# Patient Record
Sex: FEMALE | Race: WHITE | NOT HISPANIC OR LATINO | Employment: UNEMPLOYED | URBAN - METROPOLITAN AREA
[De-identification: names, ages, dates, MRNs, and addresses within clinical notes are randomized per-mention and may not be internally consistent; named-entity substitution may affect disease eponyms.]

---
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
## 2021-10-30 ENCOUNTER — APPOINTMENT (OUTPATIENT)
Dept: RADIOLOGY | Facility: HOSPITAL | Age: 21
End: 2021-10-30
Attending: STUDENT IN AN ORGANIZED HEALTH CARE EDUCATION/TRAINING PROGRAM
Payer: COMMERCIAL

## 2021-10-30 ENCOUNTER — HOSPITAL ENCOUNTER (EMERGENCY)
Facility: HOSPITAL | Age: 21
Discharge: HOME | End: 2021-10-30
Attending: EMERGENCY MEDICINE | Admitting: EMERGENCY MEDICINE
Payer: COMMERCIAL

## 2021-10-30 VITALS
RESPIRATION RATE: 16 BRPM | HEIGHT: 63 IN | BODY MASS INDEX: 21.26 KG/M2 | TEMPERATURE: 98 F | HEART RATE: 74 BPM | WEIGHT: 120 LBS | DIASTOLIC BLOOD PRESSURE: 82 MMHG | OXYGEN SATURATION: 100 % | SYSTOLIC BLOOD PRESSURE: 123 MMHG

## 2021-10-30 DIAGNOSIS — S99.921A FOOT INJURY, RIGHT, INITIAL ENCOUNTER: ICD-10-CM

## 2021-10-30 PROCEDURE — 73630 X-RAY EXAM OF FOOT: CPT | Mod: RT

## 2021-10-30 PROCEDURE — 99283 EMERGENCY DEPT VISIT LOW MDM: CPT

## 2021-10-30 ASSESSMENT — MIFFLIN-ST. JEOR: SCORE: 1278.45

## 2021-10-30 NOTE — Clinical Note
Dunia Corley was seen and treated in our emergency department on 10/30/2021.  She may return to work on 11/01/2021.       If you have any questions or concerns, please don't hesitate to call.      Christian Hopper MD

## 2021-10-30 NOTE — Clinical Note
Dunia Corley was seen and treated in our emergency department on 10/30/2021.    Foot injury     Sincerely,     [facility] Emergency Department

## 2021-10-30 NOTE — Clinical Note
Dunia Corley was seen and treated in our emergency department on 10/30/2021.    Foot injury     Sincerely,     [facility] Emergency Department

## 2021-10-30 NOTE — Clinical Note
Dunia Corley was seen and treated in our emergency department on 10/30/2021.    Foot injury     Sincerely,     [facility] Emergency Department

## 2021-10-30 NOTE — Clinical Note
Dunia Corley was seen and treated in our emergency department on 10/30/2021.    Foot injury     Sincerely,     [facility] Emergency Department

## 2021-10-30 NOTE — Clinical Note
Dunia Corley was seen and treated in our emergency department on 10/30/2021.    Foot injury     Sincerely,     [facility] Emergency Department
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
## 2022-06-20 ENCOUNTER — HOSPITAL ENCOUNTER (EMERGENCY)
Facility: HOSPITAL | Age: 22
Discharge: HOME | End: 2022-06-20
Admitting: PHYSICIAN ASSISTANT
Payer: COMMERCIAL

## 2022-06-20 VITALS
SYSTOLIC BLOOD PRESSURE: 113 MMHG | OXYGEN SATURATION: 96 % | HEART RATE: 95 BPM | HEIGHT: 62 IN | TEMPERATURE: 98 F | RESPIRATION RATE: 16 BRPM | WEIGHT: 120 LBS | BODY MASS INDEX: 22.08 KG/M2 | DIASTOLIC BLOOD PRESSURE: 69 MMHG

## 2022-06-20 DIAGNOSIS — S01.81XA FACIAL LACERATION, INITIAL ENCOUNTER: ICD-10-CM

## 2022-06-20 PROCEDURE — 99282 EMERGENCY DEPT VISIT SF MDM: CPT

## 2022-06-20 ASSESSMENT — ENCOUNTER SYMPTOMS: WOUND: 1

## 2022-06-20 NOTE — Clinical Note
Dunia Corley was seen and treated in our emergency department on 6/20/2022.  She may return to work on 06/21/2022.  This note is to certify that the aforementioned patient was seen in the emergency department.  Please excuse them from work until the above-mentioned date.  If you have questions or concerns please call [facility] emergency department for further details.     If you have any questions or concerns, please don't hesitate to call.      Héctor Bob PA-C

## 2022-06-21 NOTE — DISCHARGE INSTRUCTIONS
You are seen here in the emergency department for evaluation of facial laceration.  The laceration is no longer open, has scabbed over.  Additionally we do not typically close lacerations that are more than 24 hours old.  My recommendation is to use a little bit of antibiotic ointment, continue to monitor the area.  Any possible cuts on the inside of your mouth should heal fairly quickly.  For pain at home he can use ibuprofen or Tylenol.  Additionally please use a little topical lotion over the area, keep it clean and dry.    Please follow-up with your primary care doctor if you continue to have issues with it.    For pain or fever you may use:  -Tylenol 650 mg every 6 hours.  Max 4000 mg in 24 hours  Do not use thismedication with alcohol as it can cause liver problems.  -Ibuprofen 600 mg every 6 hours.  Max 3500 mg in 24 hours  Do not take this medication if you have a history of a GI bleed or have kidney problems.  You may use both of these medications at the same time or you can alternate them every 3 hours.  For example, Tylenol at 6 AM, ibuprofen at 9 AM, Tylenol at noon, etc.
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
## 2022-11-22 ENCOUNTER — HOSPITAL ENCOUNTER (EMERGENCY)
Facility: HOSPITAL | Age: 22
Discharge: HOME | End: 2022-11-22
Attending: EMERGENCY MEDICINE
Payer: COMMERCIAL

## 2022-11-22 VITALS
OXYGEN SATURATION: 98 % | HEIGHT: 64 IN | HEART RATE: 124 BPM | SYSTOLIC BLOOD PRESSURE: 98 MMHG | WEIGHT: 108.3 LBS | DIASTOLIC BLOOD PRESSURE: 50 MMHG | TEMPERATURE: 99 F | BODY MASS INDEX: 18.49 KG/M2 | RESPIRATION RATE: 16 BRPM

## 2022-11-22 DIAGNOSIS — H66.012 ACUTE SUPPURATIVE OTITIS MEDIA OF LEFT EAR WITH SPONTANEOUS RUPTURE OF TYMPANIC MEMBRANE, RECURRENCE NOT SPECIFIED: ICD-10-CM

## 2022-11-22 RX ORDER — CEFDINIR 300 MG/1
300 CAPSULE ORAL 2 TIMES DAILY
Qty: 14 CAPSULE | Refills: 0 | Status: SHIPPED | OUTPATIENT
Start: 2022-11-22 | End: 2024-09-21

## 2022-11-22 NOTE — ED TRIAGE NOTES
Patient noted that when she woke up there was fresh blood on her left ear that she noted with her finger. Patient also notes ear fullness.     MD Valle inspected patient ear in triage and noted a ruptured ear drum on the left

## 2022-12-28 NOTE — ED PROVIDER NOTES
EMERGENCY DEPARTMENT ENCOUNTER      NAME: Dunia Corley  AGE: 22 year old female  YOB: 2000  MRN: [id number]  EVALUATION DATE & TIME: No admission date for patient encounter.    PCP: No Ref-Primary, Physician    ED PROVIDER: Hemal Valle MD      Chief Complaint   Patient presents with     Ear Drainage         FINAL IMPRESSION:  1. Acute suppurative otitis media of left ear with spontaneous rupture of tympanic membrane, recurrence not specified          ED COURSE & MEDICAL DECISION MAKING:    Pertinent Labs & Imaging studies reviewed. (See chart for details)  22 year old female presents to the Emergency Department for evaluation of fullness in left ear and discharge from left ear.  She had gone to bed feeling well.  Awakened with some blood on the pillow and then on her fingertip.  No obvious cold symptoms.  Patient seen in triage area.  Vital signs significant for tachycardia.  Patient with inferior posterior perforation on the left TM.  Heart and lungs unremarkable.  Will initiate antibiotics.  Routine return precautions given.         At the conclusion of the encounter I discussed the results of all of the tests and the disposition. The questions were answered. The patient or family acknowledged understanding and was agreeable with the care plan.         MEDICATIONS GIVEN IN THE EMERGENCY:  Medications - No data to display    NEW PRESCRIPTIONS STARTED AT TODAY'S ER VISIT  Discharge Medication List as of 11/22/2022  3:46 PM      START taking these medications    Details   cefdinir (OMNICEF) 300 MG capsule Take 1 capsule (300 mg) by mouth 2 times daily, Disp-14 capsule, R-0, Local Print                =================================================================    HPI          Dunia Corley is a 22 year old female with a pertinent history of polysubstance abuse presenting with left ear fullness and bloody discharge.  Symptoms over this morning.  Denies any significant cough or cold  "symptoms.  No obvious headache.    REVIEW OF SYSTEMS   Review of Systems negative fevers, chills.  No nausea or vomiting.  Main review of systems was negative    PAST MEDICAL HISTORY:  No past medical history on file.    PAST SURGICAL HISTORY:  No past surgical history on file.        CURRENT MEDICATIONS:    cefdinir (OMNICEF) 300 MG capsule  cholecalciferol 4000 units TABS  ferrous sulfate (IRON) 325 (65 Fe) MG tablet        ALLERGIES:  No Known Allergies    FAMILY HISTORY:  Family History   Problem Relation Age of Onset     Substance Abuse Mother      Substance Abuse Father      Substance Abuse Maternal Aunt      Substance Abuse Paternal Uncle        SOCIAL HISTORY:   Social History     Socioeconomic History     Marital status: Single   Substance and Sexual Activity     Alcohol use: Yes     Drug use: Yes     Types: Cocaine, Marijuana, Oxycodone, Benzodiazepines, MDMA (Ecstasy), Amphetamines, Opiates, LSD     Comment: Drug use: Current Xanax, history of cocaine, LSD, percocet, and marijuana use     Sexual activity: Yes     Partners: Male       VITALS:  BP 98/50   Pulse (!) 124   Temp 99  F (37.2  C) (Temporal)   Resp 16   Ht 1.626 m (5' 4\")   Wt 49.1 kg (108 lb 4.8 oz)   SpO2 98%   BMI 18.59 kg/m      PHYSICAL EXAM    Constitutional: Well developed, Well nourished, NAD,   HENT: Normocephalic, Atraumatic, Bilateral external ears normal, Oropharynx normal, mucous membranes moist, Nose normal. Neck-  Normal range of motion.  Left TM with posterior inferior perforation with slight bloody discharge.    Eyes: PERRL, EOMI, Conjunctiva normal, No discharge.   Respiratory: Normal breath sounds, No respiratory distress, No wheezing, Speaks full sentences easily.    Cardiovascular: Rapid heart rate, Regular rhythm, No murmurs, No rubs, No gallops  Musculoskeletal: No cyanosis, No clubbing. Good range of motion in all major joints.    Integument: Warm, Dry, No erythema, No rash. No petechiae.  Neurologic: Alert & " oriented x 3, Normal motor function, Normal sensory function, No focal deficits noted. Normal gait. se see official radiology report.            Hemal Valle MD  [facility] EMERGENCY DEPARTMENT  [address] [place] [id number] [phone]     Hemal Valle MD  12/28/22 0786

## 2023-04-24 ENCOUNTER — HOSPITAL ENCOUNTER (EMERGENCY)
Facility: HOSPITAL | Age: 23
Discharge: HOME | End: 2023-04-24
Attending: EMERGENCY MEDICINE | Admitting: EMERGENCY MEDICINE
Payer: COMMERCIAL

## 2023-04-24 VITALS
OXYGEN SATURATION: 100 % | DIASTOLIC BLOOD PRESSURE: 80 MMHG | TEMPERATURE: 98.5 F | HEART RATE: 130 BPM | BODY MASS INDEX: 19.63 KG/M2 | HEIGHT: 64 IN | RESPIRATION RATE: 18 BRPM | SYSTOLIC BLOOD PRESSURE: 158 MMHG | WEIGHT: 115 LBS

## 2023-04-24 DIAGNOSIS — S01.81XA FACIAL LACERATION, INITIAL ENCOUNTER: ICD-10-CM

## 2023-04-24 PROBLEM — S52.236K: Status: ACTIVE | Noted: 2021-08-25

## 2023-04-24 PROBLEM — Z86.2 HISTORY OF ANEMIA: Status: ACTIVE | Noted: 2020-09-28

## 2023-04-24 PROBLEM — Z87.898 HISTORY OF SUBSTANCE USE DISORDER: Status: ACTIVE | Noted: 2018-07-10

## 2023-04-24 PROBLEM — G47.09 OTHER INSOMNIA: Status: ACTIVE | Noted: 2018-08-13

## 2023-04-24 PROBLEM — T84.84XA PAINFUL ORTHOPAEDIC HARDWARE (H): Status: ACTIVE | Noted: 2022-03-15

## 2023-04-24 PROCEDURE — 99283 EMERGENCY DEPT VISIT LOW MDM: CPT

## 2023-04-24 PROCEDURE — 250N000009 HC RX 250: Performed by: EMERGENCY MEDICINE

## 2023-04-24 PROCEDURE — 12011 RPR F/E/E/N/L/M 2.5 CM/<: CPT

## 2023-04-24 RX ORDER — ERYTHROMYCIN 5 MG/G
0.5 OINTMENT OPHTHALMIC 2 TIMES DAILY
Qty: 1 G | Refills: 0 | Status: SHIPPED | OUTPATIENT
Start: 2023-04-24 | End: 2023-04-24

## 2023-04-24 RX ORDER — ERYTHROMYCIN 5 MG/G
0.5 OINTMENT OPHTHALMIC 2 TIMES DAILY
Qty: 3.5 G | Refills: 0 | Status: SHIPPED | OUTPATIENT
Start: 2023-04-24 | End: 2024-09-21

## 2023-04-24 RX ORDER — TETRACAINE HYDROCHLORIDE 5 MG/ML
1-2 SOLUTION OPHTHALMIC ONCE
Status: COMPLETED | OUTPATIENT
Start: 2023-04-24 | End: 2023-04-24

## 2023-04-24 RX ORDER — GINSENG 100 MG
CAPSULE ORAL ONCE
Status: COMPLETED | OUTPATIENT
Start: 2023-04-24 | End: 2023-04-24

## 2023-04-24 RX ADMIN — BACITRACIN 1 APPLICATION: 500 OINTMENT TOPICAL at 06:10

## 2023-04-24 RX ADMIN — FLUORESCEIN SODIUM 1 STRIP: 1 STRIP OPHTHALMIC at 04:36

## 2023-04-24 RX ADMIN — TETRACAINE HYDROCHLORIDE 2 DROP: 5 SOLUTION OPHTHALMIC at 04:36

## 2023-04-24 ASSESSMENT — ENCOUNTER SYMPTOMS
EYE PAIN: 0
EYE DISCHARGE: 1
PHOTOPHOBIA: 0
WOUND: 1

## 2023-04-24 ASSESSMENT — ACTIVITIES OF DAILY LIVING (ADL): ADLS_ACUITY_SCORE: 35
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
## 2023-04-24 NOTE — ED NOTES
[facility]  Expected date:   Expected time:   Means of arrival:   Comments:  Crash
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
## 2024-09-20 ENCOUNTER — HOSPITAL ENCOUNTER (EMERGENCY)
Facility: HOSPITAL | Age: 24
Discharge: STILL ADMITTED OR ADMINISTRATIVE | End: 2024-09-21
Attending: EMERGENCY MEDICINE | Admitting: EMERGENCY MEDICINE
Payer: COMMERCIAL

## 2024-09-20 DIAGNOSIS — L02.01 ACUTE ABSCESS OF FACE: ICD-10-CM

## 2024-09-20 DIAGNOSIS — S02.652B OPEN FRACTURE OF LEFT MANDIBULAR ANGLE, INITIAL ENCOUNTER (H): ICD-10-CM

## 2024-09-20 DIAGNOSIS — E87.6 HYPOKALEMIA: ICD-10-CM

## 2024-09-20 PROCEDURE — 96367 TX/PROPH/DG ADDL SEQ IV INF: CPT

## 2024-09-20 PROCEDURE — 99285 EMERGENCY DEPT VISIT HI MDM: CPT | Mod: 25

## 2024-09-20 PROCEDURE — 96366 THER/PROPH/DIAG IV INF ADDON: CPT

## 2024-09-20 PROCEDURE — 96375 TX/PRO/DX INJ NEW DRUG ADDON: CPT

## 2024-09-20 PROCEDURE — 96365 THER/PROPH/DIAG IV INF INIT: CPT

## 2024-09-20 ASSESSMENT — COLUMBIA-SUICIDE SEVERITY RATING SCALE - C-SSRS
1. IN THE PAST MONTH, HAVE YOU WISHED YOU WERE DEAD OR WISHED YOU COULD GO TO SLEEP AND NOT WAKE UP?: NO
6. HAVE YOU EVER DONE ANYTHING, STARTED TO DO ANYTHING, OR PREPARED TO DO ANYTHING TO END YOUR LIFE?: NO
2. HAVE YOU ACTUALLY HAD ANY THOUGHTS OF KILLING YOURSELF IN THE PAST MONTH?: NO

## 2024-09-20 ASSESSMENT — ACTIVITIES OF DAILY LIVING (ADL): ADLS_ACUITY_SCORE: 33

## 2024-09-21 ENCOUNTER — ANESTHESIA EVENT (OUTPATIENT)
Dept: SURGERY | Facility: CLINIC | Age: 24
DRG: 141 | End: 2024-09-21
Payer: COMMERCIAL

## 2024-09-21 ENCOUNTER — APPOINTMENT (OUTPATIENT)
Dept: CT IMAGING | Facility: HOSPITAL | Age: 24
End: 2024-09-21
Attending: EMERGENCY MEDICINE
Payer: COMMERCIAL

## 2024-09-21 ENCOUNTER — HOSPITAL ENCOUNTER (INPATIENT)
Facility: CLINIC | Age: 24
LOS: 8 days | Discharge: HOME | DRG: 141 | End: 2024-09-29
Attending: EMERGENCY MEDICINE | Admitting: INTERNAL MEDICINE
Payer: COMMERCIAL

## 2024-09-21 VITALS
TEMPERATURE: 98.9 F | RESPIRATION RATE: 16 BRPM | SYSTOLIC BLOOD PRESSURE: 112 MMHG | HEIGHT: 63 IN | BODY MASS INDEX: 19.99 KG/M2 | DIASTOLIC BLOOD PRESSURE: 60 MMHG | WEIGHT: 112.8 LBS | OXYGEN SATURATION: 98 % | HEART RATE: 68 BPM

## 2024-09-21 DIAGNOSIS — T84.84XA PAINFUL ORTHOPAEDIC HARDWARE (H): ICD-10-CM

## 2024-09-21 DIAGNOSIS — K12.2 ORAL ABSCESS: ICD-10-CM

## 2024-09-21 DIAGNOSIS — E87.6 HYPOKALEMIA: ICD-10-CM

## 2024-09-21 DIAGNOSIS — S02.609B: ICD-10-CM

## 2024-09-21 DIAGNOSIS — S52.235K: Primary | ICD-10-CM

## 2024-09-21 LAB
ALBUMIN SERPL BCG-MCNC: 3.3 G/DL (ref 3.5–5.2)
ALP SERPL-CCNC: 91 U/L (ref 40–150)
ALT SERPL W P-5'-P-CCNC: 8 U/L (ref 0–50)
ANION GAP SERPL CALCULATED.3IONS-SCNC: 12 MMOL/L (ref 7–15)
APAP SERPL-MCNC: <5 UG/ML (ref 10–30)
APTT PPP: 36 SECONDS (ref 22–38)
AST SERPL W P-5'-P-CCNC: 12 U/L (ref 0–45)
BASOPHILS # BLD AUTO: 0 10E3/UL (ref 0–0.2)
BASOPHILS NFR BLD AUTO: 0 %
BILIRUB DIRECT SERPL-MCNC: <0.2 MG/DL (ref 0–0.3)
BILIRUB SERPL-MCNC: 0.3 MG/DL
BUN SERPL-MCNC: 11.4 MG/DL (ref 6–20)
CALCIUM SERPL-MCNC: 8.5 MG/DL (ref 8.8–10.4)
CHLORIDE SERPL-SCNC: 100 MMOL/L (ref 98–107)
CREAT SERPL-MCNC: 0.61 MG/DL (ref 0.51–0.95)
EGFRCR SERPLBLD CKD-EPI 2021: >90 ML/MIN/1.73M2
EOSINOPHIL # BLD AUTO: 0 10E3/UL (ref 0–0.7)
EOSINOPHIL NFR BLD AUTO: 0 %
ERYTHROCYTE [DISTWIDTH] IN BLOOD BY AUTOMATED COUNT: 14 % (ref 10–15)
GLUCOSE SERPL-MCNC: 91 MG/DL (ref 70–99)
HCG SERPL QL: NEGATIVE
HCO3 SERPL-SCNC: 24 MMOL/L (ref 22–29)
HCT VFR BLD AUTO: 29.3 % (ref 35–47)
HGB BLD-MCNC: 9.7 G/DL (ref 11.7–15.7)
HOLD SPECIMEN: NORMAL
IMM GRANULOCYTES # BLD: 0.1 10E3/UL
IMM GRANULOCYTES NFR BLD: 1 %
INR PPP: 1.1 (ref 0.85–1.15)
LYMPHOCYTES # BLD AUTO: 1.8 10E3/UL (ref 0.8–5.3)
LYMPHOCYTES NFR BLD AUTO: 13 %
MCH RBC QN AUTO: 27.6 PG (ref 26.5–33)
MCHC RBC AUTO-ENTMCNC: 33.1 G/DL (ref 31.5–36.5)
MCV RBC AUTO: 84 FL (ref 78–100)
MONOCYTES # BLD AUTO: 1.1 10E3/UL (ref 0–1.3)
MONOCYTES NFR BLD AUTO: 7 %
NEUTROPHILS # BLD AUTO: 11.3 10E3/UL (ref 1.6–8.3)
NEUTROPHILS NFR BLD AUTO: 79 %
NRBC # BLD AUTO: 0 10E3/UL
NRBC BLD AUTO-RTO: 0 /100
PLATELET # BLD AUTO: 395 10E3/UL (ref 150–450)
POTASSIUM SERPL-SCNC: 2.7 MMOL/L (ref 3.4–5.3)
POTASSIUM SERPL-SCNC: 3 MMOL/L (ref 3.4–5.3)
POTASSIUM SERPL-SCNC: 4.6 MMOL/L (ref 3.4–5.3)
PROT SERPL-MCNC: 6.3 G/DL (ref 6.4–8.3)
RBC # BLD AUTO: 3.51 10E6/UL (ref 3.8–5.2)
SODIUM SERPL-SCNC: 136 MMOL/L (ref 135–145)
WBC # BLD AUTO: 14.4 10E3/UL (ref 4–11)

## 2024-09-21 PROCEDURE — 80143 DRUG ASSAY ACETAMINOPHEN: CPT

## 2024-09-21 PROCEDURE — 258N000003 HC RX IP 258 OP 636

## 2024-09-21 PROCEDURE — 84132 ASSAY OF SERUM POTASSIUM: CPT | Performed by: EMERGENCY MEDICINE

## 2024-09-21 PROCEDURE — 85610 PROTHROMBIN TIME: CPT | Performed by: EMERGENCY MEDICINE

## 2024-09-21 PROCEDURE — 85025 COMPLETE CBC W/AUTO DIFF WBC: CPT | Performed by: EMERGENCY MEDICINE

## 2024-09-21 PROCEDURE — 250N000011 HC RX IP 250 OP 636

## 2024-09-21 PROCEDURE — 250N000011 HC RX IP 250 OP 636: Performed by: EMERGENCY MEDICINE

## 2024-09-21 PROCEDURE — 84132 ASSAY OF SERUM POTASSIUM: CPT | Performed by: INTERNAL MEDICINE

## 2024-09-21 PROCEDURE — 96374 THER/PROPH/DIAG INJ IV PUSH: CPT | Performed by: EMERGENCY MEDICINE

## 2024-09-21 PROCEDURE — 84703 CHORIONIC GONADOTROPIN ASSAY: CPT | Performed by: EMERGENCY MEDICINE

## 2024-09-21 PROCEDURE — 258N000003 HC RX IP 258 OP 636: Performed by: EMERGENCY MEDICINE

## 2024-09-21 PROCEDURE — 99285 EMERGENCY DEPT VISIT HI MDM: CPT | Mod: 25 | Performed by: EMERGENCY MEDICINE

## 2024-09-21 PROCEDURE — 250N000013 HC RX MED GY IP 250 OP 250 PS 637: Performed by: INTERNAL MEDICINE

## 2024-09-21 PROCEDURE — 70491 CT SOFT TISSUE NECK W/DYE: CPT

## 2024-09-21 PROCEDURE — 99207 PR APP CREDIT; MD BILLING SHARED VISIT: CPT

## 2024-09-21 PROCEDURE — 80048 BASIC METABOLIC PNL TOTAL CA: CPT | Performed by: EMERGENCY MEDICINE

## 2024-09-21 PROCEDURE — 250N000013 HC RX MED GY IP 250 OP 250 PS 637

## 2024-09-21 PROCEDURE — 120N000002 HC R&B MED SURG/OB UMMC

## 2024-09-21 PROCEDURE — 85730 THROMBOPLASTIN TIME PARTIAL: CPT | Performed by: EMERGENCY MEDICINE

## 2024-09-21 PROCEDURE — 250N000011 HC RX IP 250 OP 636: Performed by: INTERNAL MEDICINE

## 2024-09-21 PROCEDURE — 36415 COLL VENOUS BLD VENIPUNCTURE: CPT | Performed by: EMERGENCY MEDICINE

## 2024-09-21 PROCEDURE — 82248 BILIRUBIN DIRECT: CPT

## 2024-09-21 PROCEDURE — 96361 HYDRATE IV INFUSION ADD-ON: CPT | Performed by: EMERGENCY MEDICINE

## 2024-09-21 PROCEDURE — 99285 EMERGENCY DEPT VISIT HI MDM: CPT | Performed by: EMERGENCY MEDICINE

## 2024-09-21 PROCEDURE — 87040 BLOOD CULTURE FOR BACTERIA: CPT

## 2024-09-21 PROCEDURE — 99222 1ST HOSP IP/OBS MODERATE 55: CPT | Mod: AI | Performed by: INTERNAL MEDICINE

## 2024-09-21 PROCEDURE — 36415 COLL VENOUS BLD VENIPUNCTURE: CPT | Performed by: INTERNAL MEDICINE

## 2024-09-21 RX ORDER — TRETINOIN 0.5 MG/G
CREAM TOPICAL AT BEDTIME
Status: ON HOLD | COMMUNITY
End: 2024-09-29

## 2024-09-21 RX ORDER — LIDOCAINE 40 MG/G
CREAM TOPICAL
Status: DISCONTINUED | OUTPATIENT
Start: 2024-09-21 | End: 2024-09-30 | Stop reason: HOSPADM

## 2024-09-21 RX ORDER — ONDANSETRON 4 MG/1
4 TABLET, ORALLY DISINTEGRATING ORAL EVERY 6 HOURS PRN
Status: CANCELLED | OUTPATIENT
Start: 2024-09-21

## 2024-09-21 RX ORDER — SODIUM CHLORIDE 9 MG/ML
INJECTION, SOLUTION INTRAVENOUS CONTINUOUS
Status: DISCONTINUED | OUTPATIENT
Start: 2024-09-21 | End: 2024-09-21

## 2024-09-21 RX ORDER — ACETAMINOPHEN 325 MG/1
650 TABLET ORAL EVERY 4 HOURS PRN
Status: DISCONTINUED | OUTPATIENT
Start: 2024-09-21 | End: 2024-09-30 | Stop reason: HOSPADM

## 2024-09-21 RX ORDER — POTASSIUM CHLORIDE 7.45 MG/ML
10 INJECTION INTRAVENOUS ONCE
Status: COMPLETED | OUTPATIENT
Start: 2024-09-21 | End: 2024-09-21

## 2024-09-21 RX ORDER — IBUPROFEN 800 MG/1
800 TABLET, FILM COATED ORAL EVERY 8 HOURS PRN
Status: ON HOLD | COMMUNITY
Start: 2024-07-16 | End: 2024-09-29

## 2024-09-21 RX ORDER — AMOXICILLIN 250 MG
1 CAPSULE ORAL 2 TIMES DAILY
Status: CANCELLED | OUTPATIENT
Start: 2024-09-21

## 2024-09-21 RX ORDER — BISACODYL 10 MG
10 SUPPOSITORY, RECTAL RECTAL DAILY PRN
Status: CANCELLED | OUTPATIENT
Start: 2024-09-24

## 2024-09-21 RX ORDER — LIDOCAINE 40 MG/G
CREAM TOPICAL
Status: CANCELLED | OUTPATIENT
Start: 2024-09-21

## 2024-09-21 RX ORDER — PROCHLORPERAZINE MALEATE 10 MG
10 TABLET ORAL EVERY 6 HOURS PRN
Status: CANCELLED | OUTPATIENT
Start: 2024-09-21

## 2024-09-21 RX ORDER — ONDANSETRON 2 MG/ML
4 INJECTION INTRAMUSCULAR; INTRAVENOUS EVERY 6 HOURS PRN
Status: CANCELLED | OUTPATIENT
Start: 2024-09-21

## 2024-09-21 RX ORDER — HYDROMORPHONE HCL IN WATER/PF 6 MG/30 ML
0.2 PATIENT CONTROLLED ANALGESIA SYRINGE INTRAVENOUS ONCE
Status: COMPLETED | OUTPATIENT
Start: 2024-09-21 | End: 2024-09-21

## 2024-09-21 RX ORDER — HYDROMORPHONE HCL IN WATER/PF 6 MG/30 ML
0.2 PATIENT CONTROLLED ANALGESIA SYRINGE INTRAVENOUS
Status: DISCONTINUED | OUTPATIENT
Start: 2024-09-21 | End: 2024-09-30 | Stop reason: HOSPADM

## 2024-09-21 RX ORDER — POTASSIUM CHLORIDE 1.5 G/1.58G
40 POWDER, FOR SOLUTION ORAL ONCE
Status: COMPLETED | OUTPATIENT
Start: 2024-09-21 | End: 2024-09-21

## 2024-09-21 RX ORDER — OXYCODONE HYDROCHLORIDE 5 MG/1
5 TABLET ORAL EVERY 4 HOURS PRN
Status: DISCONTINUED | OUTPATIENT
Start: 2024-09-21 | End: 2024-09-30 | Stop reason: HOSPADM

## 2024-09-21 RX ORDER — SODIUM CHLORIDE 9 MG/ML
INJECTION, SOLUTION INTRAVENOUS CONTINUOUS
Status: DISCONTINUED | OUTPATIENT
Start: 2024-09-21 | End: 2024-09-21 | Stop reason: HOSPADM

## 2024-09-21 RX ORDER — POLYETHYLENE GLYCOL 3350 17 G/17G
17 POWDER, FOR SOLUTION ORAL DAILY
Status: CANCELLED | OUTPATIENT
Start: 2024-09-22

## 2024-09-21 RX ORDER — MIRTAZAPINE 15 MG/1
15 TABLET, ORALLY DISINTEGRATING ORAL AT BEDTIME
Status: DISCONTINUED | OUTPATIENT
Start: 2024-09-21 | End: 2024-09-30 | Stop reason: HOSPADM

## 2024-09-21 RX ORDER — SODIUM CHLORIDE, SODIUM LACTATE, POTASSIUM CHLORIDE, CALCIUM CHLORIDE 600; 310; 30; 20 MG/100ML; MG/100ML; MG/100ML; MG/100ML
INJECTION, SOLUTION INTRAVENOUS CONTINUOUS
Status: DISCONTINUED | OUTPATIENT
Start: 2024-09-21 | End: 2024-09-21

## 2024-09-21 RX ORDER — POTASSIUM CHLORIDE 1.5 G/1.58G
20 POWDER, FOR SOLUTION ORAL ONCE
Status: COMPLETED | OUTPATIENT
Start: 2024-09-21 | End: 2024-09-21

## 2024-09-21 RX ORDER — KETOROLAC TROMETHAMINE 15 MG/ML
15 INJECTION, SOLUTION INTRAMUSCULAR; INTRAVENOUS ONCE
Status: COMPLETED | OUTPATIENT
Start: 2024-09-21 | End: 2024-09-21

## 2024-09-21 RX ORDER — ACETAMINOPHEN 325 MG/1
650 TABLET ORAL EVERY 4 HOURS PRN
Status: CANCELLED | OUTPATIENT
Start: 2024-09-24

## 2024-09-21 RX ORDER — PIPERACILLIN SODIUM, TAZOBACTAM SODIUM 3; .375 G/15ML; G/15ML
3.38 INJECTION, POWDER, LYOPHILIZED, FOR SOLUTION INTRAVENOUS ONCE
Status: COMPLETED | OUTPATIENT
Start: 2024-09-21 | End: 2024-09-21

## 2024-09-21 RX ORDER — ACETAMINOPHEN 500 MG
1000 TABLET ORAL EVERY 8 HOURS PRN
Status: ON HOLD | COMMUNITY
Start: 2024-07-16 | End: 2024-09-29

## 2024-09-21 RX ORDER — AMPICILLIN AND SULBACTAM 2; 1 G/1; G/1
3 INJECTION, POWDER, FOR SOLUTION INTRAMUSCULAR; INTRAVENOUS ONCE
Status: COMPLETED | OUTPATIENT
Start: 2024-09-21 | End: 2024-09-21

## 2024-09-21 RX ORDER — MIRTAZAPINE 15 MG/1
15 TABLET, FILM COATED ORAL AT BEDTIME
COMMUNITY
Start: 2024-07-16 | End: 2024-10-04

## 2024-09-21 RX ORDER — BUPROPION HYDROCHLORIDE 300 MG/1
300 TABLET ORAL EVERY MORNING
Status: ON HOLD | COMMUNITY
Start: 2024-07-17 | End: 2024-09-29

## 2024-09-21 RX ORDER — DEXAMETHASONE SODIUM PHOSPHATE 10 MG/ML
8 INJECTION, SOLUTION INTRAMUSCULAR; INTRAVENOUS EVERY 8 HOURS
Status: DISPENSED | OUTPATIENT
Start: 2024-09-21 | End: 2024-09-22

## 2024-09-21 RX ORDER — ONDANSETRON 2 MG/ML
4 INJECTION INTRAMUSCULAR; INTRAVENOUS EVERY 30 MIN PRN
Status: DISCONTINUED | OUTPATIENT
Start: 2024-09-20 | End: 2024-09-21 | Stop reason: HOSPADM

## 2024-09-21 RX ORDER — AMPICILLIN AND SULBACTAM 2; 1 G/1; G/1
3 INJECTION, POWDER, FOR SOLUTION INTRAMUSCULAR; INTRAVENOUS EVERY 6 HOURS
Status: DISCONTINUED | OUTPATIENT
Start: 2024-09-21 | End: 2024-09-30 | Stop reason: HOSPADM

## 2024-09-21 RX ORDER — ACETAMINOPHEN 325 MG/1
975 TABLET ORAL EVERY 8 HOURS
Status: CANCELLED | OUTPATIENT
Start: 2024-09-21 | End: 2024-09-24

## 2024-09-21 RX ORDER — IOPAMIDOL 755 MG/ML
80 INJECTION, SOLUTION INTRAVASCULAR ONCE
Status: COMPLETED | OUTPATIENT
Start: 2024-09-21 | End: 2024-09-21

## 2024-09-21 RX ADMIN — ACETAMINOPHEN 650 MG: 325 TABLET, FILM COATED ORAL at 18:25

## 2024-09-21 RX ADMIN — POTASSIUM CHLORIDE 10 MEQ: 7.46 INJECTION, SOLUTION INTRAVENOUS at 04:49

## 2024-09-21 RX ADMIN — HYDROMORPHONE HYDROCHLORIDE 0.2 MG: 0.2 INJECTION, SOLUTION INTRAMUSCULAR; INTRAVENOUS; SUBCUTANEOUS at 09:26

## 2024-09-21 RX ADMIN — AMPICILLIN SODIUM AND SULBACTAM SODIUM 3 G: 2; 1 INJECTION, POWDER, FOR SOLUTION INTRAMUSCULAR; INTRAVENOUS at 18:26

## 2024-09-21 RX ADMIN — POTASSIUM CHLORIDE 10 MEQ: 7.46 INJECTION, SOLUTION INTRAVENOUS at 10:30

## 2024-09-21 RX ADMIN — AMPICILLIN SODIUM AND SULBACTAM SODIUM 3 G: 2; 1 INJECTION, POWDER, FOR SOLUTION INTRAMUSCULAR; INTRAVENOUS at 05:50

## 2024-09-21 RX ADMIN — SODIUM CHLORIDE, POTASSIUM CHLORIDE, SODIUM LACTATE AND CALCIUM CHLORIDE: 600; 310; 30; 20 INJECTION, SOLUTION INTRAVENOUS at 13:09

## 2024-09-21 RX ADMIN — OXYCODONE HYDROCHLORIDE 5 MG: 5 TABLET ORAL at 15:48

## 2024-09-21 RX ADMIN — HYDROMORPHONE HYDROCHLORIDE 0.2 MG: 0.2 INJECTION, SOLUTION INTRAMUSCULAR; INTRAVENOUS; SUBCUTANEOUS at 12:18

## 2024-09-21 RX ADMIN — DEXAMETHASONE SODIUM PHOSPHATE 8 MG: 10 INJECTION INTRAMUSCULAR; INTRAVENOUS at 18:28

## 2024-09-21 RX ADMIN — AMPICILLIN SODIUM AND SULBACTAM SODIUM 3 G: 2; 1 INJECTION, POWDER, FOR SOLUTION INTRAMUSCULAR; INTRAVENOUS at 12:23

## 2024-09-21 RX ADMIN — POTASSIUM CHLORIDE 20 MEQ: 1.5 POWDER, FOR SOLUTION ORAL at 18:47

## 2024-09-21 RX ADMIN — SODIUM CHLORIDE: 9 INJECTION, SOLUTION INTRAVENOUS at 09:26

## 2024-09-21 RX ADMIN — OXYCODONE HYDROCHLORIDE 5 MG: 5 TABLET ORAL at 21:23

## 2024-09-21 RX ADMIN — PIPERACILLIN AND TAZOBACTAM 3.38 G: 3; .375 INJECTION, POWDER, FOR SOLUTION INTRAVENOUS at 00:29

## 2024-09-21 RX ADMIN — IOPAMIDOL 80 ML: 755 INJECTION, SOLUTION INTRAVENOUS at 02:00

## 2024-09-21 RX ADMIN — MIRTAZAPINE 15 MG: 15 TABLET, ORALLY DISINTEGRATING ORAL at 21:23

## 2024-09-21 RX ADMIN — POTASSIUM CHLORIDE 40 MEQ: 1.5 POWDER, FOR SOLUTION ORAL at 15:32

## 2024-09-21 RX ADMIN — HYDROMORPHONE HYDROCHLORIDE 0.2 MG: 0.2 INJECTION, SOLUTION INTRAMUSCULAR; INTRAVENOUS; SUBCUTANEOUS at 18:27

## 2024-09-21 RX ADMIN — KETOROLAC TROMETHAMINE 15 MG: 15 INJECTION, SOLUTION INTRAMUSCULAR; INTRAVENOUS at 00:27

## 2024-09-21 RX ADMIN — SODIUM CHLORIDE: 9 INJECTION, SOLUTION INTRAVENOUS at 02:57

## 2024-09-21 RX ADMIN — POTASSIUM CHLORIDE 10 MEQ: 7.46 INJECTION, SOLUTION INTRAVENOUS at 02:57

## 2024-09-21 ASSESSMENT — COLUMBIA-SUICIDE SEVERITY RATING SCALE - C-SSRS
2. HAVE YOU ACTUALLY HAD ANY THOUGHTS OF KILLING YOURSELF IN THE PAST MONTH?: NO
1. IN THE PAST MONTH, HAVE YOU WISHED YOU WERE DEAD OR WISHED YOU COULD GO TO SLEEP AND NOT WAKE UP?: NO
6. HAVE YOU EVER DONE ANYTHING, STARTED TO DO ANYTHING, OR PREPARED TO DO ANYTHING TO END YOUR LIFE?: NO

## 2024-09-21 ASSESSMENT — ENCOUNTER SYMPTOMS: FACIAL SWELLING: 1

## 2024-09-21 ASSESSMENT — ACTIVITIES OF DAILY LIVING (ADL)
ADLS_ACUITY_SCORE: 18
ADLS_ACUITY_SCORE: 35
ADLS_ACUITY_SCORE: 18
ADLS_ACUITY_SCORE: 35
ADLS_ACUITY_SCORE: 35
ADLS_ACUITY_SCORE: 18
ADLS_ACUITY_SCORE: 35
ADLS_ACUITY_SCORE: 18
ADLS_ACUITY_SCORE: 35
ADLS_ACUITY_SCORE: 18

## 2024-09-21 NOTE — PLAN OF CARE
"Reason for admission: worsening L facial pain/swelling with abscess  Primary team notified of pt arrival.  Admitted from: ED WB  Via: wheelchair  Accompanied by: mother and daughter  Belongings: in patient's bag and remains with her.  Admission Required Doc Completed: No.  Teaching: Orientation to unit and call light- call light within reach, use of console, meal times, when to call for the RN, and enforced importance of safety.  IV Access: R FA PIV, saline locked.  Telemetry: No  Ht./Wt.: Completed  Code Status verified on armband: Yes.  2 RN Skin Assessment Completed with: Refused full skin assessment. She stated she does not have any wound or any skin concern.  Suction/Ambu bag/Flowmeter at bedside: Yes.    Pt status:   Vital signs:  Temp: (!) 100.8  F (38.2  C) Temp src: Oral BP: 123/84 Pulse: 101   Resp: 20 SpO2: 99 % O2 Device: None (Room air)   Height: 160 cm (5' 2.99\") Weight: 52.9 kg (116 lb 11.2 oz)  Estimated body mass index is 20.68 kg/m  as calculated from the following:    Height as of this encounter: 1.6 m (5' 2.99\").    Weight as of this encounter: 52.9 kg (116 lb 11.2 oz).    A/Ox4. Ambulatory and independent. Denied chestpain, dizziness, light-headedness. Tolerating mechanical/dental soft diet and whole pills. Continent of bowel and bladder. Last BM on 9/19/24. La facial pain rated 8/10 with IV Dilaudid and Oxycodone as PRN. Tylenol was given for T-100.8, recheck was 99.5F.  Potassium was replaced per protocol. Latest Result of 3.0 and to recheck at 2145.  NPO overnight for surgical intervention in the morning. OMF is following.              "

## 2024-09-21 NOTE — ED PROVIDER NOTES
"ED Provider Note  [facility] [place]      History     Chief Complaint   Patient presents with    Jaw Pain     Patient had gone to [facility] for dental pain, found out that patient had that patient had fracture on the left mandible with presence of abscess. Received Zosyn, IV toridle, NS running at 100ml/hr.     HPI  Dunia Corley is a 24 year old female who presents from [place] to see oral surgery.  Per [place] note she has an \"open fracture of left mandibular angle, and abscess\".  She says she has been trying to get all of her wisdom teeth removed over the last year and has episodes where she'll get some swelling on and off. She says the gums of her wisdom teeth still have a flap of tissue over them.  This last episode started for 4 days ago and just kept getting worse.       Physical Exam   BP: 118/89  Pulse: 68  Temp: 98.4  F (36.9  C)  Resp: 16  Weight: 51.2 kg (112 lb 12.8 oz)  SpO2: 97 %  Physical Exam  Vitals and nursing note reviewed.   Constitutional:       General: She is not in acute distress.     Appearance: She is normal weight. She is not toxic-appearing or diaphoretic.   HENT:      Head: Normocephalic.      Comments: Left face/jaw angle area significantly swollen.  No airway compromise.      Nose: Nose normal.   Eyes:      Extraocular Movements: Extraocular movements intact.   Cardiovascular:      Rate and Rhythm: Normal rate.   Pulmonary:      Effort: Pulmonary effort is normal.   Musculoskeletal:         General: Normal range of motion.   Skin:     General: Skin is warm and dry.      Findings: No rash.   Neurological:      General: No focal deficit present.      Mental Status: She is alert and oriented to person, place, and time.   Psychiatric:         Mood and Affect: Mood normal.           ED Course, Procedures, & Data      Procedures       Results for orders placed or performed during the hospital encounter of 09/21/24   Potassium     Status: Abnormal   Result Value Ref " Range    Potassium 3.0 (L) 3.4 - 5.3 mmol/L   INR     Status: Normal   Result Value Ref Range    INR 1.10 0.85 - 1.15   Partial thromboplastin time     Status: Normal   Result Value Ref Range    aPTT 36 22 - 38 Seconds   Hepatic panel     Status: Abnormal   Result Value Ref Range    Protein Total 6.3 (L) 6.4 - 8.3 g/dL    Albumin 3.3 (L) 3.5 - 5.2 g/dL    Bilirubin Total 0.3 <=1.2 mg/dL    Alkaline Phosphatase 91 40 - 150 U/L    AST 12 0 - 45 U/L    ALT 8 0 - 50 U/L    Bilirubin Direct <0.20 0.00 - 0.30 mg/dL   Acetaminophen level     Status: Abnormal   Result Value Ref Range    Acetaminophen <5.0 (L) 10.0 - 30.0 ug/mL   Results for orders placed or performed during the hospital encounter of 09/20/24   Soft tissue neck CT w contrast     Status: None    Narrative    EXAM: CT SOFT TISSUE NECK W CONTRAST  LOCATION: [facility]  DATE: 9/21/2024    INDICATION: 4 days of left sided facial swelling going down towards her left jaw and neck, likely from dental abscess.  COMPARISON: None.  CONTRAST: ISOVUE 370 80ML  TECHNIQUE: Routine CT Soft Tissue Neck with IV contrast. Multiplanar reformats. Dose reduction techniques were used.    FINDINGS:     MUCOSAL SPACES/SOFT TISSUES: Nondisplaced fracture left mandibular angle involves the to the socket for the third mandibular molar. Multiloculated abscess in the asymmetrically enlarged left masseter muscle and extending posterior to the mandibular angle   measures 4.6 cm AP by 2.5 cm transverse by 3.5 cm craniocaudal. There is probably also an early ill-defined abscess involving the asymmetrically enlarged left medial pterygoid muscle. There is significant surrounding soft tissue swelling and   subcutaneous edema in the left buccal region which extends into the left submandibular space. There is also edema extending into the medially along the anterior margin of the sternocleidomastoid muscle into the parapharyngeal space. Normal mucosal spaces   of the  upper aerodigestive tract. No mucosal mass or inflammation identified. Normal vocal cords and infraglottic trachea. Normal oral cavity,  spaces, and floor of mouth structures. Dental caries involving the left third maxillary molar.    LYMPH NODES: Prominent reactive left submandibular lymph nodes measure up to 0.9 cm in short axis.     SALIVARY GLANDS: Normal parotid and submandibular glands.    THYROID: Normal.     VESSELS: Vascular structures of the neck are patent.    VISUALIZED INTRACRANIAL/ORBITS/SINUSES: No abnormality of the visualized intracranial compartment. Small old left medial orbital wall blowout fracture. Otherwise orbits unremarkable. Visualized paranasal sinuses and mastoid air cells are clear.    OTHER: No destructive osseous lesion. The included lung apices are clear.      Impression    IMPRESSION:   1.  Large multiloculated abscess involving the left masseter muscle and extending posterior to the left mandibular angle with probable early abscess involving the left medial pterygoid muscle. Surrounding cellulitis and reactive edema as described above.  2.  Abscess is likely due to spread of oral bacteria through the nondisplaced left mandibular angle fracture which involves the tooth socket for the third mandibular molar.    3.  The above findings were discussed directly with Dr. Rae at 2:40 AM CST on 09/21/2024.   [facility] Draw     Status: None    Narrative    The following orders were created for panel order [facility] Draw.  Procedure                               Abnormality         Status                     ---------                               -----------         ------                     Extra Red Top Tube[[id number]]                               Final result               Extra Green Top (Lithium...[[id number]]                      Final result               Extra Purple Top Tube[[id number]]                            Final result                 Please view results for these tests  on the individual orders.   Extra Red Top Tube     Status: None   Result Value Ref Range    Hold Specimen JIC    Extra Green Top (Lithium Heparin) Tube     Status: None   Result Value Ref Range    Hold Specimen JIC    Extra Purple Top Tube     Status: None   Result Value Ref Range    Hold Specimen JIC    Basic metabolic panel     Status: Abnormal   Result Value Ref Range    Sodium 136 135 - 145 mmol/L    Potassium 2.7 (L) 3.4 - 5.3 mmol/L    Chloride 100 98 - 107 mmol/L    Carbon Dioxide (CO2) 24 22 - 29 mmol/L    Anion Gap 12 7 - 15 mmol/L    Urea Nitrogen 11.4 6.0 - 20.0 mg/dL    Creatinine 0.61 0.51 - 0.95 mg/dL    GFR Estimate >90 >60 mL/min/1.73m2    Calcium 8.5 (L) 8.8 - 10.4 mg/dL    Glucose 91 70 - 99 mg/dL   HCG QUALitative pregnancy (blood)     Status: Normal   Result Value Ref Range    hCG Serum Qualitative Negative Negative   CBC with platelets and differential     Status: Abnormal   Result Value Ref Range    WBC Count 14.4 (H) 4.0 - 11.0 10e3/uL    RBC Count 3.51 (L) 3.80 - 5.20 10e6/uL    Hemoglobin 9.7 (L) 11.7 - 15.7 g/dL    Hematocrit 29.3 (L) 35.0 - 47.0 %    MCV 84 78 - 100 fL    MCH 27.6 26.5 - 33.0 pg    MCHC 33.1 31.5 - 36.5 g/dL    RDW 14.0 10.0 - 15.0 %    Platelet Count 395 150 - 450 10e3/uL    % Neutrophils 79 %    % Lymphocytes 13 %    % Monocytes 7 %    % Eosinophils 0 %    % Basophils 0 %    % Immature Granulocytes 1 %    NRBCs per 100 WBC 0 <1 /100    Absolute Neutrophils 11.3 (H) 1.6 - 8.3 10e3/uL    Absolute Lymphocytes 1.8 0.8 - 5.3 10e3/uL    Absolute Monocytes 1.1 0.0 - 1.3 10e3/uL    Absolute Eosinophils 0.0 0.0 - 0.7 10e3/uL    Absolute Basophils 0.0 0.0 - 0.2 10e3/uL    Absolute Immature Granulocytes 0.1 <=0.4 10e3/uL    Absolute NRBCs 0.0 10e3/uL   CBC with platelets differential *Canceled*     Status: None ()    Narrative    The following orders were created for panel order CBC with platelets differential.  Procedure                               Abnormality         Status                      ---------                               -----------         ------                     CBC with platelets and d...[[id number]]                                                   Please view results for these tests on the individual orders.   CBC with platelets + differential     Status: Abnormal    Narrative    The following orders were created for panel order CBC with platelets + differential.  Procedure                               Abnormality         Status                     ---------                               -----------         ------                     CBC with platelets and d...[[id number]]  Abnormal            Final result                 Please view results for these tests on the individual orders.     Medications   ampicillin-sulbactam (UNASYN) 3 g vial to attach to  mL bag (0 g Intravenous Stopped 9/21/24 1302)   lidocaine 1 % 0.1-1 mL (has no administration in time range)   lidocaine (LMX4) cream (has no administration in time range)   sodium chloride (PF) 0.9% PF flush 3 mL (3 mLs Intracatheter $Given 9/21/24 1217)   sodium chloride (PF) 0.9% PF flush 3 mL (has no administration in time range)   HYDROmorphone (DILAUDID) injection 0.2 mg (0.2 mg Intravenous $Given 9/21/24 1218)   oxyCODONE (ROXICODONE) tablet 5 mg (has no administration in time range)   acetaminophen (TYLENOL) tablet 650 mg (has no administration in time range)   HYDROmorphone (DILAUDID) injection 0.2 mg (0.2 mg Intravenous $Given 9/21/24 0926)   potassium chloride 10 mEq in 100 mL sterile water infusion (0 mEq Intravenous Stopped 9/21/24 1200)     Labs Ordered and Resulted from Time of ED Arrival to Time of ED Departure   POTASSIUM - Abnormal       Result Value    Potassium 3.0 (*)    HEPATIC FUNCTION PANEL - Abnormal    Protein Total 6.3 (*)     Albumin 3.3 (*)     Bilirubin Total 0.3      Alkaline Phosphatase 91      AST 12      ALT 8      Bilirubin Direct <0.20     ACETAMINOPHEN LEVEL - Abnormal     "Acetaminophen <5.0 (*)    INR - Normal    INR 1.10     PARTIAL THROMBOPLASTIN TIME - Normal    aPTT 36     BLOOD CULTURE     No orders to display          Critical care was not performed.     Medical Decision Making  The patient's presentation was of moderate complexity (an acute complicated injury).    The patient's evaluation involved:  review of external note(s) from 3+ sources ([facility] note from earlier, ct scan results, lab from that visit, and reviewing antibiotics for that visit and when next dose is needed. )  review of 3+ test result(s) ordered prior to this encounter (see separate area of note for details)  discussion of management or test interpretation with another health professional (oral surgery and pharmacist)  Also checked potassium level here in ed today.    The patient's management necessitated high risk (a decision regarding hospitalization).    Assessment & Plan    Dunia Corley is a 24 year old female who presents from [place] to see oral surgery.  Per [place] note she has an \"open fracture of left mandibular angle, and abscess\".  She also had a potassium of 2.7 at [facility] and was given potassium 10 meq IV.    Will recheck potassium today given potassium 2.7 in [facility].  She was placed on ns 75cc/hr and npo.  She was also given 0.2 mg IV dilaudid.  I discussed her case with the triage med provider and also OMFS fellow who came to see the patient.  Medicine team will admit.      I verified antibiotic choice and will continue unasyn 3 gm every 6 hours.  She received a dose at [facility] at 6 am so next dose is at noon.         I have reviewed the nursing notes. I have reviewed the findings, diagnosis, plan and need for follow up with the patient.    New Prescriptions    No medications on file       Final diagnoses:   Oral abscess   Open fracture of left side of mandible, unspecified mandibular site, initial encounter (H)   Hypokalemia       Maribell Johnson MD  [facility] EMERGENCY " DEPARTMENT  9/21/2024     Maribell Johnson MD  09/21/24 3917
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
## 2024-09-21 NOTE — H&P
[facility]    History and Physical - Hospitalist Service, GOLD TEAM 19       Date of Admission:  9/21/2024    Assessment & Plan      Dunia Corley is a 24 year old female with history of depression, who was admitted to [facility] on 9/21/2024 for further evaluation and treatment of large multiloculated abscess of the left masseter muscle.    Large multiloculated abscess of the left masseter muscle  Abscess of left medial pterygoid muscle  Non displaced left mandibular angle fracture  Cellulitis  Leukocytosis  Presented to [facility] ED with 3 days of worsening left sided pain and swelling. In the ED, CT soft tissue showed significant findings as noted above as well as leukocytosis to 14.4. OMFS was contacted by the ED, who recommended transfer to [facility] for surgical intervention. Patient was started on Unasyn. Overall, patient hemodynamically stable without evidence of sepsis at this time. From a medical perspective, patient is clear for surgical intervention.   - OMFS consult   - Plan for intervention 9/22  - Soft diet for no    - NPO at midnight  - Continue Unasyn for now   - Consider adding vancomycin for MRSA coverage if patient worsening  - Pain management   - Tylenol 650 q6h PRN   - Oxycodone 5 mg q4h PRN   - Dilaudid IV 0.2 mg q3h PRN severe pain  - Follow CBC  - Obtain blood culture (note that this is obtained after antibiotic initiation)    Hypokalemia  On presentation to the ED, patient with potassium of 2.7 in the setting of poor oral intake due to oral pain. Receiving replacement.  - RN replacement protocol    Anemia   On admission, hemoglobin of 9.7. Most recent hemoglobin available of 12.4 from July 2024 for baseline. Patient certainly could have some blood loss from abscess, but otherwise no concern for bleed. Hemodynamically stable at this time.  - Follow CBC tomorrow AM   - Recheck hemoglobin earlier if concerns for hemodynamic instability, blood  loss    Depression  Reports she is prescribed Wellbutrin, but takes infrequently. Endorses some low mood.  - Consider SW consult for mental health resources  - Hold PTA Wellbutrin as now taking frequently        Diet: NPO per Anesthesia Guidelines for Procedure/Surgery Except for: Meds    DVT Prophylaxis: Pneumatic Compression Devices & ambulate given surgical intervention  Zimmer Catheter: Not present  Lines: None     Cardiac Monitoring: None  Code Status: Full Code    Clinically Significant Risk Factors Present on Admission        # Hypokalemia: Lowest K = 2.7 mmol/L in last 2 days, will replace as needed   # Hypocalcemia: Lowest Ca = 8.5 mg/dL in last 2 days, will monitor and replace as appropriate     # Hypoalbuminemia: Lowest albumin = 3.3 g/dL at 9/21/2024  9:25 AM, will monitor as appropriate          # Anemia: based on hgb <11                  Disposition Plan     Medically Ready for Discharge: Anticipated in 2-4 Days         The patient's care was discussed with the Attending Physician, Dr. Herrera, Bedside Nurse, Patient, and OM Consultant(s).    Madai Briseno PA-C  Hospitalist Service, [address]  Securely message with Vocera (more info)  Text page via [facility] Paging/Directory   See signed in provider for up to date coverage information    ______________________________________________________________________    Chief Complaint   Facial swelling    History is obtained from the patient and patient's chart    History of Present Illness   Dunia Corley is a 24 year old female with history of depression, who was admitted to [facility] on 9/21/2024 for further evaluation and treatment of large multiloculated abscess of the left masseter muscle.    Patient reports 3 days of worsening facial swelling and pain. Had been trying to manage at home with tylenol and advil as well as hot showers due to fear of medical visits. Reports she had been able to  express some fluid out of abscess a day or so ago with compression and hot showers. No known fevers. Presented to the ED due to increasing pain and discomfort. Currently having some pain due to recent exam by oral surgery team. No other acute complaints.     Has previously had surgery on her arm. Tolerated anaesthesia without side effects. No known issues with anaesthesia in her family.     Past Medical History    No past medical history on file.    Past Surgical History   No past surgical history on file.    Prior to Admission Medications   Prior to Admission Medications   Prescriptions Last Dose Informant Patient Reported? Taking?   cefdinir (OMNICEF) 300 MG capsule   No No   Sig: Take 1 capsule (300 mg) by mouth 2 times daily   cholecalciferol 4000 units TABS   No No   Sig: Take 4,000 Units by mouth daily   erythromycin (ROMYCIN) 5 MG/GM ophthalmic ointment   No No   Sig: Place 0.5 inches Into the left eye 2 times daily   ferrous sulfate (IRON) 325 (65 Fe) MG tablet   No No   Sig: Take 1 tablet (325 mg) by mouth daily      Facility-Administered Medications: None         Physical Exam   Vital Signs: Temp: 98.4  F (36.9  C) Temp src: Oral BP: 118/89 Pulse: 68   Resp: 16 SpO2: 97 % O2 Device: None (Room air)    Weight: 112 lbs 12.8 oz    General Appearance: Comfortable, nontoxic appearing female seen laying in bed.  Eyes: PERRLA.  No conjunctival icterus.  HEENT: Significant left sided facial swelling. Oral exam deferred given recent exam by oral surgery team.   Respiratory: Breathing comfortably on room air.  Lung sounds clear to auscultation throughout.  No wheezes, rhonchi, rales.  Cardiovascular: RRR.  No murmurs, rubs, gallops.  Lymph/Hematologic: No bruising on exposed skin.  Skin: No lesions or rashes noted on exposed skin.  Musculoskeletal: Moving all extremities spontaneously.  Neurologic: Cranial nerves II through XII grossly intact.  Psychiatric: Mood appropriate.    Medical Decision Making       70  MINUTES SPENT BY ME on the date of service doing chart review, history, exam, documentation & further activities per the note.      Data   Imaging results reviewed over the past 24 hrs:   Recent Results (from the past 24 hour(s))   Soft tissue neck CT w contrast    Narrative    EXAM: CT SOFT TISSUE NECK W CONTRAST  LOCATION: [facility]  DATE: 9/21/2024    INDICATION: 4 days of left sided facial swelling going down towards her left jaw and neck, likely from dental abscess.  COMPARISON: None.  CONTRAST: ISOVUE 370 80ML  TECHNIQUE: Routine CT Soft Tissue Neck with IV contrast. Multiplanar reformats. Dose reduction techniques were used.    FINDINGS:     MUCOSAL SPACES/SOFT TISSUES: Nondisplaced fracture left mandibular angle involves the to the socket for the third mandibular molar. Multiloculated abscess in the asymmetrically enlarged left masseter muscle and extending posterior to the mandibular angle   measures 4.6 cm AP by 2.5 cm transverse by 3.5 cm craniocaudal. There is probably also an early ill-defined abscess involving the asymmetrically enlarged left medial pterygoid muscle. There is significant surrounding soft tissue swelling and   subcutaneous edema in the left buccal region which extends into the left submandibular space. There is also edema extending into the medially along the anterior margin of the sternocleidomastoid muscle into the parapharyngeal space. Normal mucosal spaces   of the upper aerodigestive tract. No mucosal mass or inflammation identified. Normal vocal cords and infraglottic trachea. Normal oral cavity,  spaces, and floor of mouth structures. Dental caries involving the left third maxillary molar.    LYMPH NODES: Prominent reactive left submandibular lymph nodes measure up to 0.9 cm in short axis.     SALIVARY GLANDS: Normal parotid and submandibular glands.    THYROID: Normal.     VESSELS: Vascular structures of the neck are patent.    VISUALIZED  INTRACRANIAL/ORBITS/SINUSES: No abnormality of the visualized intracranial compartment. Small old left medial orbital wall blowout fracture. Otherwise orbits unremarkable. Visualized paranasal sinuses and mastoid air cells are clear.    OTHER: No destructive osseous lesion. The included lung apices are clear.      Impression    IMPRESSION:   1.  Large multiloculated abscess involving the left masseter muscle and extending posterior to the left mandibular angle with probable early abscess involving the left medial pterygoid muscle. Surrounding cellulitis and reactive edema as described above.  2.  Abscess is likely due to spread of oral bacteria through the nondisplaced left mandibular angle fracture which involves the tooth socket for the third mandibular molar.    3.  The above findings were discussed directly with Dr. Rae at 2:40 AM CST on 09/21/2024.     Recent Labs   Lab 09/21/24  0925 09/21/24  0118   WBC  --  14.4*   HGB  --  9.7*   MCV  --  84   PLT  --  395   NA  --  136   POTASSIUM 3.0* 2.7*   CHLORIDE  --  100   CO2  --  24   BUN  --  11.4   CR  --  0.61   ANIONGAP  --  12   EUGENE  --  8.5*   GLC  --  91   ALBUMIN 3.3*  --    PROTTOTAL 6.3*  --    BILITOTAL 0.3  --    ALKPHOS 91  --    ALT 8  --    AST 12  --

## 2024-09-21 NOTE — ED NOTES
Bed: ED18  Expected date: 9/21/24  Expected time:   Means of arrival:   Comments:  Dunia [age] y/o F  [facility]

## 2024-09-21 NOTE — ED TRIAGE NOTES
Triage Assessment (Adult)       Row Name 09/21/24 0855          Triage Assessment    Airway WDL WDL        Respiratory WDL    Respiratory WDL WDL        Skin Circulation/Temperature WDL    Skin Circulation/Temperature WDL WDL        Cardiac WDL    Cardiac WDL WDL        Peripheral/Neurovascular WDL    Peripheral Neurovascular WDL WDL        Cognitive/Neuro/Behavioral WDL    Cognitive/Neuro/Behavioral WDL WDL

## 2024-09-21 NOTE — MEDICATION SCRIBE - ADMISSION MEDICATION HISTORY
Medication Scribe Admission Medication History    Admission medication history is complete. The information provided in this note is only as accurate as the sources available at the time of the update.    Information Source(s): Patient, Hospital records, and CareEverywhere/SureScripts via in-person    Pertinent Information: None.    Changes made to PTA medication list:  Added: Acetaminophen 500-1000 Q 8 hrs PRN, Bupropion  mg daily, ibuprofen 800 mg         Q 8 Hrs PRN, Tretinoin 0.05% apply at bedtime weekly, mirtazapine 15 mg daily.  Deleted: Cefdinir 300 mg BID, cholecalciferol 4000 units daily, erythromycin 5 mg/g 0.5 inches into left eye BID, ferrous sulfate 325 mg daily.  Changed: None    Allergies reviewed with patient and updates made in EHR: yes    Medication History Completed By: Walker Desir MD 9/21/2024 12:23 PM    PTA Med List   Medication Sig Last Dose    acetaminophen (TYLENOL) 500 MG tablet Take 1,000 mg by mouth every 8 hours as needed for mild pain. 9/20/2024 at am    buPROPion (WELLBUTRIN XL) 300 MG 24 hr tablet Take 300 mg by mouth every morning. Past Month at unknown    ibuprofen (ADVIL/MOTRIN) 800 MG tablet Take 800 mg by mouth every 8 hours as needed for mild pain. 9/20/2024 at am    mirtazapine (REMERON) 15 MG tablet Take 15 mg by mouth at bedtime. More than a month at unknown    tretinoin (RETIN-A) 0.05 % external cream Apply topically at bedtime. Past Week at unknown
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
## 2024-09-22 ENCOUNTER — ANESTHESIA (OUTPATIENT)
Dept: SURGERY | Facility: CLINIC | Age: 24
DRG: 141 | End: 2024-09-22
Payer: COMMERCIAL

## 2024-09-22 LAB
ABO/RH(D): NORMAL
ALBUMIN SERPL BCG-MCNC: 3.6 G/DL (ref 3.5–5.2)
ALP SERPL-CCNC: 83 U/L (ref 40–150)
ALT SERPL W P-5'-P-CCNC: 7 U/L (ref 0–50)
ANION GAP SERPL CALCULATED.3IONS-SCNC: 10 MMOL/L (ref 7–15)
ANTIBODY SCREEN: NEGATIVE
AST SERPL W P-5'-P-CCNC: 10 U/L (ref 0–45)
BACTERIA SPEC CULT: ABNORMAL
BASE EXCESS BLDV CALC-SCNC: 1.9 MMOL/L (ref -3–3)
BILIRUB DIRECT SERPL-MCNC: <0.2 MG/DL (ref 0–0.3)
BILIRUB SERPL-MCNC: 0.2 MG/DL
BLD PROD TYP BPU: NORMAL
BLD PROD TYP BPU: NORMAL
BLOOD COMPONENT TYPE: NORMAL
BLOOD COMPONENT TYPE: NORMAL
BUN SERPL-MCNC: 7.9 MG/DL (ref 6–20)
CA-I BLD-MCNC: 4.6 MG/DL (ref 4.4–5.2)
CALCIUM SERPL-MCNC: 8.7 MG/DL (ref 8.8–10.4)
CHLORIDE SERPL-SCNC: 102 MMOL/L (ref 98–107)
CODING SYSTEM: NORMAL
CODING SYSTEM: NORMAL
CREAT SERPL-MCNC: 0.42 MG/DL (ref 0.51–0.95)
CROSSMATCH: NORMAL
CROSSMATCH: NORMAL
EGFRCR SERPLBLD CKD-EPI 2021: >90 ML/MIN/1.73M2
ERYTHROCYTE [DISTWIDTH] IN BLOOD BY AUTOMATED COUNT: 13.8 % (ref 10–15)
ERYTHROCYTE [DISTWIDTH] IN BLOOD BY AUTOMATED COUNT: 14.1 % (ref 10–15)
FERRITIN SERPL-MCNC: 133 NG/ML (ref 6–175)
GLUCOSE BLD-MCNC: 160 MG/DL (ref 70–99)
GLUCOSE SERPL-MCNC: 149 MG/DL (ref 70–99)
GRAM STAIN RESULT: ABNORMAL
HAPTOGLOB SERPL-MCNC: 347 MG/DL (ref 30–200)
HCO3 BLDV-SCNC: 26 MMOL/L (ref 21–28)
HCO3 SERPL-SCNC: 25 MMOL/L (ref 22–29)
HCT VFR BLD AUTO: 15.8 % (ref 35–47)
HCT VFR BLD AUTO: 17.6 % (ref 35–47)
HGB BLD-MCNC: 10.3 G/DL (ref 11.7–15.7)
HGB BLD-MCNC: 5.5 G/DL (ref 11.7–15.7)
HGB BLD-MCNC: 5.9 G/DL (ref 11.7–15.7)
HGB BLD-MCNC: 9.7 G/DL (ref 11.7–15.7)
IRON BINDING CAPACITY (ROCHE): 271 UG/DL (ref 240–430)
IRON SATN MFR SERPL: 6 % (ref 15–46)
IRON SERPL-MCNC: 17 UG/DL (ref 37–145)
ISSUE DATE AND TIME: NORMAL
ISSUE DATE AND TIME: NORMAL
LACTATE BLD-SCNC: 0.9 MMOL/L (ref 0.7–2)
LDH SERPL L TO P-CCNC: 145 U/L (ref 0–250)
MCH RBC QN AUTO: 28.6 PG (ref 26.5–33)
MCH RBC QN AUTO: 29.1 PG (ref 26.5–33)
MCHC RBC AUTO-ENTMCNC: 33.5 G/DL (ref 31.5–36.5)
MCHC RBC AUTO-ENTMCNC: 34.8 G/DL (ref 31.5–36.5)
MCV RBC AUTO: 84 FL (ref 78–100)
MCV RBC AUTO: 85 FL (ref 78–100)
O2/TOTAL GAS SETTING VFR VENT: 90 %
OXYHGB MFR BLDV: 99 % (ref 70–75)
PCO2 BLDV: 36 MM HG (ref 40–50)
PH BLDV: 7.46 [PH] (ref 7.32–7.43)
PLATELET # BLD AUTO: 600 10E3/UL (ref 150–450)
PLATELET # BLD AUTO: 617 10E3/UL (ref 150–450)
PO2 BLDV: 217 MM HG (ref 25–47)
POTASSIUM BLD-SCNC: 4.5 MMOL/L (ref 3.4–5.3)
POTASSIUM SERPL-SCNC: 4.1 MMOL/L (ref 3.4–5.3)
PROT SERPL-MCNC: 6.7 G/DL (ref 6.4–8.3)
RBC # BLD AUTO: 1.89 10E6/UL (ref 3.8–5.2)
RBC # BLD AUTO: 2.06 10E6/UL (ref 3.8–5.2)
RETICS # AUTO: 0.01 10E6/UL (ref 0.03–0.1)
RETICS/RBC NFR AUTO: 0.8 % (ref 0.5–2)
SAO2 % BLDV: 100 % (ref 70–75)
SODIUM BLD-SCNC: 139 MMOL/L (ref 135–145)
SODIUM SERPL-SCNC: 137 MMOL/L (ref 135–145)
SPECIMEN EXPIRATION DATE: NORMAL
UNIT ABO/RH: NORMAL
UNIT ABO/RH: NORMAL
UNIT NUMBER: NORMAL
UNIT NUMBER: NORMAL
UNIT STATUS: NORMAL
UNIT STATUS: NORMAL
UNIT TYPE ISBT: 5100
UNIT TYPE ISBT: 5100
VIT B12 SERPL-MCNC: 1464 PG/ML (ref 232–1245)
WBC # BLD AUTO: 10.8 10E3/UL (ref 4–11)
WBC # BLD AUTO: 12.7 10E3/UL (ref 4–11)

## 2024-09-22 PROCEDURE — 82728 ASSAY OF FERRITIN: CPT | Performed by: INTERNAL MEDICINE

## 2024-09-22 PROCEDURE — 0NSV04Z REPOSITION LEFT MANDIBLE WITH INTERNAL FIXATION DEVICE, OPEN APPROACH: ICD-10-PCS | Performed by: DENTIST

## 2024-09-22 PROCEDURE — 87205 SMEAR GRAM STAIN: CPT | Performed by: DENTIST

## 2024-09-22 PROCEDURE — 250N000011 HC RX IP 250 OP 636: Performed by: INTERNAL MEDICINE

## 2024-09-22 PROCEDURE — 85045 AUTOMATED RETICULOCYTE COUNT: CPT | Performed by: INTERNAL MEDICINE

## 2024-09-22 PROCEDURE — 0CDWXZ0 EXTRACTION OF UPPER TOOTH, SINGLE, EXTERNAL APPROACH: ICD-10-PCS | Performed by: DENTIST

## 2024-09-22 PROCEDURE — 83615 LACTATE (LD) (LDH) ENZYME: CPT | Performed by: INTERNAL MEDICINE

## 2024-09-22 PROCEDURE — 272N000001 HC OR GENERAL SUPPLY STERILE: Performed by: DENTIST

## 2024-09-22 PROCEDURE — 250N000009 HC RX 250: Performed by: NURSE ANESTHETIST, CERTIFIED REGISTERED

## 2024-09-22 PROCEDURE — 82330 ASSAY OF CALCIUM: CPT

## 2024-09-22 PROCEDURE — 250N000025 HC SEVOFLURANE, PER MIN: Performed by: DENTIST

## 2024-09-22 PROCEDURE — 85018 HEMOGLOBIN: CPT | Performed by: INTERNAL MEDICINE

## 2024-09-22 PROCEDURE — 710N000010 HC RECOVERY PHASE 1, LEVEL 2, PER MIN: Performed by: DENTIST

## 2024-09-22 PROCEDURE — C1713 ANCHOR/SCREW BN/BN,TIS/BN: HCPCS | Performed by: DENTIST

## 2024-09-22 PROCEDURE — 83550 IRON BINDING TEST: CPT | Performed by: INTERNAL MEDICINE

## 2024-09-22 PROCEDURE — 370N000017 HC ANESTHESIA TECHNICAL FEE, PER MIN: Performed by: DENTIST

## 2024-09-22 PROCEDURE — 250N000011 HC RX IP 250 OP 636: Performed by: NURSE ANESTHETIST, CERTIFIED REGISTERED

## 2024-09-22 PROCEDURE — 0CDXXZ0 EXTRACTION OF LOWER TOOTH, SINGLE, EXTERNAL APPROACH: ICD-10-PCS | Performed by: DENTIST

## 2024-09-22 PROCEDURE — 250N000009 HC RX 250: Performed by: DENTIST

## 2024-09-22 PROCEDURE — 250N000013 HC RX MED GY IP 250 OP 250 PS 637: Performed by: INTERNAL MEDICINE

## 2024-09-22 PROCEDURE — P9016 RBC LEUKOCYTES REDUCED: HCPCS

## 2024-09-22 PROCEDURE — 86923 COMPATIBILITY TEST ELECTRIC: CPT

## 2024-09-22 PROCEDURE — 250N000011 HC RX IP 250 OP 636: Performed by: STUDENT IN AN ORGANIZED HEALTH CARE EDUCATION/TRAINING PROGRAM

## 2024-09-22 PROCEDURE — 82607 VITAMIN B-12: CPT | Performed by: INTERNAL MEDICINE

## 2024-09-22 PROCEDURE — 21461 OPTX MNDBLR FX WO NTRDNTL: CPT | Performed by: STUDENT IN AN ORGANIZED HEALTH CARE EDUCATION/TRAINING PROGRAM

## 2024-09-22 PROCEDURE — 250N000013 HC RX MED GY IP 250 OP 250 PS 637: Performed by: DENTIST

## 2024-09-22 PROCEDURE — 83010 ASSAY OF HAPTOGLOBIN QUANT: CPT | Performed by: INTERNAL MEDICINE

## 2024-09-22 PROCEDURE — 258N000003 HC RX IP 258 OP 636: Performed by: NURSE ANESTHETIST, CERTIFIED REGISTERED

## 2024-09-22 PROCEDURE — 87077 CULTURE AEROBIC IDENTIFY: CPT | Performed by: DENTIST

## 2024-09-22 PROCEDURE — 99232 SBSQ HOSP IP/OBS MODERATE 35: CPT | Performed by: INTERNAL MEDICINE

## 2024-09-22 PROCEDURE — 87075 CULTR BACTERIA EXCEPT BLOOD: CPT | Performed by: DENTIST

## 2024-09-22 PROCEDURE — 36415 COLL VENOUS BLD VENIPUNCTURE: CPT | Performed by: INTERNAL MEDICINE

## 2024-09-22 PROCEDURE — 85027 COMPLETE CBC AUTOMATED: CPT | Performed by: NURSE ANESTHETIST, CERTIFIED REGISTERED

## 2024-09-22 PROCEDURE — 250N000011 HC RX IP 250 OP 636

## 2024-09-22 PROCEDURE — 85027 COMPLETE CBC AUTOMATED: CPT

## 2024-09-22 PROCEDURE — 250N000011 HC RX IP 250 OP 636: Performed by: EMERGENCY MEDICINE

## 2024-09-22 PROCEDURE — 82248 BILIRUBIN DIRECT: CPT | Performed by: INTERNAL MEDICINE

## 2024-09-22 PROCEDURE — 250N000013 HC RX MED GY IP 250 OP 250 PS 637

## 2024-09-22 PROCEDURE — 120N000002 HC R&B MED SURG/OB UMMC

## 2024-09-22 PROCEDURE — 86901 BLOOD TYPING SEROLOGIC RH(D): CPT | Performed by: NURSE ANESTHETIST, CERTIFIED REGISTERED

## 2024-09-22 PROCEDURE — 86900 BLOOD TYPING SEROLOGIC ABO: CPT | Performed by: NURSE ANESTHETIST, CERTIFIED REGISTERED

## 2024-09-22 PROCEDURE — 999N000141 HC STATISTIC PRE-PROCEDURE NURSING ASSESSMENT: Performed by: DENTIST

## 2024-09-22 PROCEDURE — 360N000077 HC SURGERY LEVEL 4, PER MIN: Performed by: DENTIST

## 2024-09-22 PROCEDURE — 250N000013 HC RX MED GY IP 250 OP 250 PS 637: Performed by: STUDENT IN AN ORGANIZED HEALTH CARE EDUCATION/TRAINING PROGRAM

## 2024-09-22 PROCEDURE — 80048 BASIC METABOLIC PNL TOTAL CA: CPT

## 2024-09-22 DEVICE — IMPLANTABLE DEVICE: Type: IMPLANTABLE DEVICE | Site: MANDIBLE | Status: FUNCTIONAL

## 2024-09-22 DEVICE — IMP SCR SYN 2.0X08MM SELF TAP: Type: IMPLANTABLE DEVICE | Site: MANDIBLE | Status: FUNCTIONAL

## 2024-09-22 DEVICE — IMP SCR SYN 2.0X12MM SELF TAP: Type: IMPLANTABLE DEVICE | Site: MANDIBLE | Status: FUNCTIONAL

## 2024-09-22 DEVICE — IMP SCR SYN MATRIX 2.0X06MM SELF TAP  04.503.406.01: Type: IMPLANTABLE DEVICE | Site: MANDIBLE | Status: FUNCTIONAL

## 2024-09-22 DEVICE — WIRE SURGICAL STEEL 22GA DS-22: Type: IMPLANTABLE DEVICE | Site: MANDIBLE | Status: FUNCTIONAL

## 2024-09-22 RX ORDER — LIDOCAINE 40 MG/G
CREAM TOPICAL
Status: DISCONTINUED | OUTPATIENT
Start: 2024-09-22 | End: 2024-09-22 | Stop reason: HOSPADM

## 2024-09-22 RX ORDER — SODIUM CHLORIDE, SODIUM LACTATE, POTASSIUM CHLORIDE, CALCIUM CHLORIDE 600; 310; 30; 20 MG/100ML; MG/100ML; MG/100ML; MG/100ML
INJECTION, SOLUTION INTRAVENOUS CONTINUOUS PRN
Status: DISCONTINUED | OUTPATIENT
Start: 2024-09-22 | End: 2024-09-22

## 2024-09-22 RX ORDER — NALOXONE HYDROCHLORIDE 0.4 MG/ML
0.1 INJECTION, SOLUTION INTRAMUSCULAR; INTRAVENOUS; SUBCUTANEOUS
Status: DISCONTINUED | OUTPATIENT
Start: 2024-09-22 | End: 2024-09-22 | Stop reason: HOSPADM

## 2024-09-22 RX ORDER — KETOROLAC TROMETHAMINE 30 MG/ML
INJECTION, SOLUTION INTRAMUSCULAR; INTRAVENOUS PRN
Status: DISCONTINUED | OUTPATIENT
Start: 2024-09-22 | End: 2024-09-22

## 2024-09-22 RX ORDER — ONDANSETRON 2 MG/ML
4 INJECTION INTRAMUSCULAR; INTRAVENOUS EVERY 30 MIN PRN
Status: DISCONTINUED | OUTPATIENT
Start: 2024-09-22 | End: 2024-09-22 | Stop reason: HOSPADM

## 2024-09-22 RX ORDER — DIPHENHYDRAMINE HCL 25 MG
25 CAPSULE ORAL EVERY 6 HOURS PRN
Status: DISCONTINUED | OUTPATIENT
Start: 2024-09-22 | End: 2024-09-22 | Stop reason: HOSPADM

## 2024-09-22 RX ORDER — ACETAMINOPHEN 325 MG/1
975 TABLET ORAL ONCE
Status: DISCONTINUED | OUTPATIENT
Start: 2024-09-22 | End: 2024-09-22 | Stop reason: HOSPADM

## 2024-09-22 RX ORDER — SODIUM CHLORIDE, SODIUM LACTATE, POTASSIUM CHLORIDE, CALCIUM CHLORIDE 600; 310; 30; 20 MG/100ML; MG/100ML; MG/100ML; MG/100ML
INJECTION, SOLUTION INTRAVENOUS CONTINUOUS
Status: DISCONTINUED | OUTPATIENT
Start: 2024-09-22 | End: 2024-09-22 | Stop reason: HOSPADM

## 2024-09-22 RX ORDER — ONDANSETRON 4 MG/1
4 TABLET, ORALLY DISINTEGRATING ORAL EVERY 30 MIN PRN
Status: DISCONTINUED | OUTPATIENT
Start: 2024-09-22 | End: 2024-09-22 | Stop reason: HOSPADM

## 2024-09-22 RX ORDER — DEXAMETHASONE SODIUM PHOSPHATE 4 MG/ML
INJECTION, SOLUTION INTRA-ARTICULAR; INTRALESIONAL; INTRAMUSCULAR; INTRAVENOUS; SOFT TISSUE PRN
Status: DISCONTINUED | OUTPATIENT
Start: 2024-09-22 | End: 2024-09-22

## 2024-09-22 RX ORDER — HYDRALAZINE HYDROCHLORIDE 20 MG/ML
2.5-5 INJECTION INTRAMUSCULAR; INTRAVENOUS EVERY 10 MIN PRN
Status: DISCONTINUED | OUTPATIENT
Start: 2024-09-22 | End: 2024-09-22 | Stop reason: HOSPADM

## 2024-09-22 RX ORDER — DEXAMETHASONE SODIUM PHOSPHATE 4 MG/ML
4 INJECTION, SOLUTION INTRA-ARTICULAR; INTRALESIONAL; INTRAMUSCULAR; INTRAVENOUS; SOFT TISSUE
Status: DISCONTINUED | OUTPATIENT
Start: 2024-09-22 | End: 2024-09-22 | Stop reason: HOSPADM

## 2024-09-22 RX ORDER — OXYCODONE HYDROCHLORIDE 5 MG/1
5 TABLET ORAL
Status: DISCONTINUED | OUTPATIENT
Start: 2024-09-22 | End: 2024-09-22 | Stop reason: HOSPADM

## 2024-09-22 RX ORDER — KETAMINE HYDROCHLORIDE 50 MG/ML
INJECTION, SOLUTION INTRAMUSCULAR; INTRAVENOUS PRN
Status: DISCONTINUED | OUTPATIENT
Start: 2024-09-22 | End: 2024-09-22

## 2024-09-22 RX ORDER — HYDROMORPHONE HYDROCHLORIDE 1 MG/ML
0.4 INJECTION, SOLUTION INTRAMUSCULAR; INTRAVENOUS; SUBCUTANEOUS EVERY 5 MIN PRN
Status: DISCONTINUED | OUTPATIENT
Start: 2024-09-22 | End: 2024-09-22 | Stop reason: HOSPADM

## 2024-09-22 RX ORDER — FENTANYL CITRATE 50 UG/ML
25 INJECTION, SOLUTION INTRAMUSCULAR; INTRAVENOUS
Status: DISCONTINUED | OUTPATIENT
Start: 2024-09-22 | End: 2024-09-22 | Stop reason: HOSPADM

## 2024-09-22 RX ORDER — CARBOXYMETHYLCELLULOSE SODIUM 5 MG/ML
1 SOLUTION/ DROPS OPHTHALMIC
Status: DISCONTINUED | OUTPATIENT
Start: 2024-09-22 | End: 2024-09-30 | Stop reason: HOSPADM

## 2024-09-22 RX ORDER — LIDOCAINE HYDROCHLORIDE 20 MG/ML
INJECTION, SOLUTION INFILTRATION; PERINEURAL PRN
Status: DISCONTINUED | OUTPATIENT
Start: 2024-09-22 | End: 2024-09-22

## 2024-09-22 RX ORDER — PROPOFOL 10 MG/ML
INJECTION, EMULSION INTRAVENOUS PRN
Status: DISCONTINUED | OUTPATIENT
Start: 2024-09-22 | End: 2024-09-22

## 2024-09-22 RX ORDER — MAGNESIUM HYDROXIDE 1200 MG/15ML
LIQUID ORAL PRN
Status: DISCONTINUED | OUTPATIENT
Start: 2024-09-22 | End: 2024-09-22 | Stop reason: HOSPADM

## 2024-09-22 RX ORDER — FENTANYL CITRATE 50 UG/ML
25 INJECTION, SOLUTION INTRAMUSCULAR; INTRAVENOUS EVERY 5 MIN PRN
Status: DISCONTINUED | OUTPATIENT
Start: 2024-09-22 | End: 2024-09-22 | Stop reason: HOSPADM

## 2024-09-22 RX ORDER — ACETAMINOPHEN 325 MG/1
975 TABLET ORAL ONCE
Status: COMPLETED | OUTPATIENT
Start: 2024-09-22 | End: 2024-09-22

## 2024-09-22 RX ORDER — MEPERIDINE HYDROCHLORIDE 25 MG/ML
12.5 INJECTION INTRAMUSCULAR; INTRAVENOUS; SUBCUTANEOUS EVERY 5 MIN PRN
Status: DISCONTINUED | OUTPATIENT
Start: 2024-09-22 | End: 2024-09-22 | Stop reason: HOSPADM

## 2024-09-22 RX ORDER — KETOROLAC TROMETHAMINE 15 MG/ML
15 INJECTION, SOLUTION INTRAMUSCULAR; INTRAVENOUS
Status: DISCONTINUED | OUTPATIENT
Start: 2024-09-22 | End: 2024-09-22 | Stop reason: HOSPADM

## 2024-09-22 RX ORDER — FENTANYL CITRATE 50 UG/ML
50 INJECTION, SOLUTION INTRAMUSCULAR; INTRAVENOUS EVERY 5 MIN PRN
Status: DISCONTINUED | OUTPATIENT
Start: 2024-09-22 | End: 2024-09-22 | Stop reason: HOSPADM

## 2024-09-22 RX ORDER — HYDROMORPHONE HYDROCHLORIDE 1 MG/ML
0.2 INJECTION, SOLUTION INTRAMUSCULAR; INTRAVENOUS; SUBCUTANEOUS EVERY 10 MIN PRN
Status: DISCONTINUED | OUTPATIENT
Start: 2024-09-22 | End: 2024-09-22 | Stop reason: HOSPADM

## 2024-09-22 RX ORDER — HYDROMORPHONE HYDROCHLORIDE 1 MG/ML
0.2 INJECTION, SOLUTION INTRAMUSCULAR; INTRAVENOUS; SUBCUTANEOUS EVERY 5 MIN PRN
Status: DISCONTINUED | OUTPATIENT
Start: 2024-09-22 | End: 2024-09-22 | Stop reason: HOSPADM

## 2024-09-22 RX ORDER — BUPIVACAINE HYDROCHLORIDE AND EPINEPHRINE 2.5; 5 MG/ML; UG/ML
INJECTION, SOLUTION INFILTRATION; PERINEURAL PRN
Status: DISCONTINUED | OUTPATIENT
Start: 2024-09-22 | End: 2024-09-22 | Stop reason: HOSPADM

## 2024-09-22 RX ORDER — ONDANSETRON 2 MG/ML
INJECTION INTRAMUSCULAR; INTRAVENOUS PRN
Status: DISCONTINUED | OUTPATIENT
Start: 2024-09-22 | End: 2024-09-22

## 2024-09-22 RX ORDER — LABETALOL HYDROCHLORIDE 5 MG/ML
10 INJECTION, SOLUTION INTRAVENOUS
Status: DISCONTINUED | OUTPATIENT
Start: 2024-09-22 | End: 2024-09-22 | Stop reason: HOSPADM

## 2024-09-22 RX ORDER — LORAZEPAM 2 MG/ML
.5-1 INJECTION INTRAMUSCULAR
Status: DISCONTINUED | OUTPATIENT
Start: 2024-09-22 | End: 2024-09-22 | Stop reason: HOSPADM

## 2024-09-22 RX ORDER — OXYCODONE HYDROCHLORIDE 10 MG/1
10 TABLET ORAL
Status: DISCONTINUED | OUTPATIENT
Start: 2024-09-22 | End: 2024-09-22 | Stop reason: HOSPADM

## 2024-09-22 RX ORDER — FENTANYL CITRATE 50 UG/ML
INJECTION, SOLUTION INTRAMUSCULAR; INTRAVENOUS PRN
Status: DISCONTINUED | OUTPATIENT
Start: 2024-09-22 | End: 2024-09-22

## 2024-09-22 RX ORDER — CHLORHEXIDINE GLUCONATE ORAL RINSE 1.2 MG/ML
15 SOLUTION DENTAL 2 TIMES DAILY
Status: DISCONTINUED | OUTPATIENT
Start: 2024-09-22 | End: 2024-09-30 | Stop reason: HOSPADM

## 2024-09-22 RX ORDER — DIPHENHYDRAMINE HYDROCHLORIDE 50 MG/ML
25 INJECTION INTRAMUSCULAR; INTRAVENOUS EVERY 6 HOURS PRN
Status: DISCONTINUED | OUTPATIENT
Start: 2024-09-22 | End: 2024-09-22 | Stop reason: HOSPADM

## 2024-09-22 RX ORDER — ALBUTEROL SULFATE 0.83 MG/ML
2.5 SOLUTION RESPIRATORY (INHALATION) EVERY 4 HOURS PRN
Status: DISCONTINUED | OUTPATIENT
Start: 2024-09-22 | End: 2024-09-22 | Stop reason: HOSPADM

## 2024-09-22 RX ORDER — CHLORHEXIDINE GLUCONATE ORAL RINSE 1.2 MG/ML
SOLUTION DENTAL PRN
Status: DISCONTINUED | OUTPATIENT
Start: 2024-09-22 | End: 2024-09-22 | Stop reason: HOSPADM

## 2024-09-22 RX ADMIN — CHLORHEXIDINE GLUCONATE, 0.12% ORAL RINSE 15 ML: 1.2 SOLUTION DENTAL at 21:26

## 2024-09-22 RX ADMIN — CARBOXYMETHYLCELLULOSE SODIUM 1 DROP: 5 SOLUTION/ DROPS OPHTHALMIC at 17:23

## 2024-09-22 RX ADMIN — FENTANYL CITRATE 50 MCG: 50 INJECTION INTRAMUSCULAR; INTRAVENOUS at 09:44

## 2024-09-22 RX ADMIN — AMPICILLIN SODIUM AND SULBACTAM SODIUM 3 G: 2; 1 INJECTION, POWDER, FOR SOLUTION INTRAMUSCULAR; INTRAVENOUS at 05:59

## 2024-09-22 RX ADMIN — OXYCODONE HYDROCHLORIDE 5 MG: 5 TABLET ORAL at 19:26

## 2024-09-22 RX ADMIN — HYDROMORPHONE HYDROCHLORIDE 0.2 MG: 1 INJECTION, SOLUTION INTRAMUSCULAR; INTRAVENOUS; SUBCUTANEOUS at 12:18

## 2024-09-22 RX ADMIN — ONDANSETRON 4 MG: 2 INJECTION INTRAMUSCULAR; INTRAVENOUS at 10:25

## 2024-09-22 RX ADMIN — AMPICILLIN SODIUM AND SULBACTAM SODIUM 3 G: 2; 1 INJECTION, POWDER, FOR SOLUTION INTRAMUSCULAR; INTRAVENOUS at 19:15

## 2024-09-22 RX ADMIN — ACETAMINOPHEN 650 MG: 325 TABLET, FILM COATED ORAL at 13:59

## 2024-09-22 RX ADMIN — LIDOCAINE HYDROCHLORIDE 60 MG: 20 INJECTION, SOLUTION INFILTRATION; PERINEURAL at 09:09

## 2024-09-22 RX ADMIN — ACETAMINOPHEN 975 MG: 325 TABLET ORAL at 07:45

## 2024-09-22 RX ADMIN — AMPICILLIN SODIUM AND SULBACTAM SODIUM 3 G: 2; 1 INJECTION, POWDER, FOR SOLUTION INTRAMUSCULAR; INTRAVENOUS at 00:17

## 2024-09-22 RX ADMIN — DEXMEDETOMIDINE HYDROCHLORIDE 10 MCG: 100 INJECTION, SOLUTION INTRAVENOUS at 10:00

## 2024-09-22 RX ADMIN — PROPOFOL 150 MG: 10 INJECTION, EMULSION INTRAVENOUS at 09:11

## 2024-09-22 RX ADMIN — DEXAMETHASONE SODIUM PHOSPHATE 10 MG: 4 INJECTION, SOLUTION INTRAMUSCULAR; INTRAVENOUS at 09:12

## 2024-09-22 RX ADMIN — Medication 20 MG: at 09:44

## 2024-09-22 RX ADMIN — FENTANYL CITRATE 50 MCG: 50 INJECTION INTRAMUSCULAR; INTRAVENOUS at 09:05

## 2024-09-22 RX ADMIN — SUCCINYLCHOLINE CHLORIDE 60 MG: 20 INJECTION, SOLUTION INTRAMUSCULAR; INTRAVENOUS; PARENTERAL at 09:12

## 2024-09-22 RX ADMIN — MIDAZOLAM 2 MG: 1 INJECTION INTRAMUSCULAR; INTRAVENOUS at 09:00

## 2024-09-22 RX ADMIN — FENTANYL CITRATE 25 MCG: 50 INJECTION INTRAMUSCULAR; INTRAVENOUS at 11:26

## 2024-09-22 RX ADMIN — DEXAMETHASONE SODIUM PHOSPHATE 8 MG: 10 INJECTION INTRAMUSCULAR; INTRAVENOUS at 02:18

## 2024-09-22 RX ADMIN — KETAMINE HYDROCHLORIDE 30 MG: 50 INJECTION INTRAMUSCULAR; INTRAVENOUS at 10:01

## 2024-09-22 RX ADMIN — SUGAMMADEX 120 MG: 100 INJECTION, SOLUTION INTRAVENOUS at 10:45

## 2024-09-22 RX ADMIN — DEXMEDETOMIDINE HYDROCHLORIDE 10 MCG: 100 INJECTION, SOLUTION INTRAVENOUS at 10:15

## 2024-09-22 RX ADMIN — KETOROLAC TROMETHAMINE 15 MG: 30 INJECTION, SOLUTION INTRAMUSCULAR at 10:53

## 2024-09-22 RX ADMIN — HYDROMORPHONE HYDROCHLORIDE 0.25 MG: 1 INJECTION, SOLUTION INTRAMUSCULAR; INTRAVENOUS; SUBCUTANEOUS at 09:50

## 2024-09-22 RX ADMIN — MIRTAZAPINE 15 MG: 15 TABLET, ORALLY DISINTEGRATING ORAL at 21:26

## 2024-09-22 RX ADMIN — SODIUM CHLORIDE, POTASSIUM CHLORIDE, SODIUM LACTATE AND CALCIUM CHLORIDE: 600; 310; 30; 20 INJECTION, SOLUTION INTRAVENOUS at 08:58

## 2024-09-22 RX ADMIN — CARBOXYMETHYLCELLULOSE SODIUM 1 DROP: 5 SOLUTION/ DROPS OPHTHALMIC at 21:26

## 2024-09-22 RX ADMIN — HYDROMORPHONE HYDROCHLORIDE 0.2 MG: 0.2 INJECTION, SOLUTION INTRAMUSCULAR; INTRAVENOUS; SUBCUTANEOUS at 13:59

## 2024-09-22 RX ADMIN — HYDROMORPHONE HYDROCHLORIDE 0.2 MG: 0.2 INJECTION, SOLUTION INTRAMUSCULAR; INTRAVENOUS; SUBCUTANEOUS at 21:38

## 2024-09-22 ASSESSMENT — ACTIVITIES OF DAILY LIVING (ADL)
ADLS_ACUITY_SCORE: 18
DEPENDENT_IADLS:: INDEPENDENT
ADLS_ACUITY_SCORE: 18

## 2024-09-22 NOTE — PLAN OF CARE
"  Problem: Adult Inpatient Plan of Care  Goal: Plan of Care Review  Description: The Plan of Care Review/Shift note should be completed every shift.  The Outcome Evaluation is a brief statement about your assessment that the patient is improving, declining, or no change.  This information will be displayed automatically on your shift  note.  Outcome: Progressing  Flowsheets (Taken 9/22/2024 1603)  Outcome Evaluation: Patient to return home when able.  Plan of Care Reviewed With: patient  Overall Patient Progress: no change     Problem: Adult Inpatient Plan of Care  Goal: Plan of Care Review  Description: The Plan of Care Review/Shift note should be completed every shift.  The Outcome Evaluation is a brief statement about your assessment that the patient is improving, declining, or no change.  This information will be displayed automatically on your shift  note.  Outcome: Progressing  Flowsheets (Taken 9/22/2024 1603)  Outcome Evaluation: Patient to return home when able.  Plan of Care Reviewed With: patient  Overall Patient Progress: no change  Goal: Patient-Specific Goal (Individualized)  Description: You can add care plan individualizations to a care plan. Examples of Individualization might be:  \"Parent requests to be called daily at 9am for status\", \"I have a hard time hearing out of my right ear\", or \"Do not touch me to wake me up as it startles  me\".  Outcome: Progressing  Goal: Absence of Hospital-Acquired Illness or Injury  Outcome: Progressing  Goal: Optimal Comfort and Wellbeing  Outcome: Progressing  Goal: Readiness for Transition of Care  Outcome: Progressing     Problem: Pain Acute  Goal: Optimal Pain Control and Function  Outcome: Progressing     "
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
## 2024-09-22 NOTE — PLAN OF CARE
"  Problem: Adult Inpatient Plan of Care  Goal: Plan of Care Review  Description:   VS: /69   Pulse 56   Temp 99.2  F (37.3  C) (Axillary)   Resp 20   Ht 1.6 m (5' 2.99\")   Wt 52.9 kg (116 lb 11.2 oz)   SpO2 99%   BMI 20.68 kg/m     O2:  Room Air / CAPNO   Output:  Continent of Bowel and Bladder   Last BM:  09/21/2024   Activity:  Independent   Up for meals?  Yes   Skin:  Old Bruising    Pain:  Yes    CMS:  A&OX 3-4   Dressing:  Yes    Diet:  NPO / Liquids    LDA:  Right PIV SL   Equipment:  Personal Belongings    Plan:  POC   Additional Info:  Fracture of Angle of Left Mandible       Pain Management   Dilaudid 0.2 mg     Hemoglobin 5.5    Procedure(s): 09/22/24 0700 am    Open reduction internal fixation Left Mandable Angle Fracture, Application of Maxillodibullar Fixation  Irrigation and debridement of facial abscess via intra and extra oral approaches  Extraction of teeth x2  Incision and drainage neck, comb    Outcome: Progressing  Goal: Patient-Specific Goal (Individualized)  Description: You can add care plan individualizations to a care plan. Examples of Individualization might be:  \"Parent requests to be called daily at 9am for status\", \"I have a hard time hearing out of my right ear\", or \"Do not touch me to wake me up as it startles  me\".  Outcome: Progressing  Goal: Absence of Hospital-Acquired Illness or Injury  Outcome: Progressing  Goal: Optimal Comfort and Wellbeing  Outcome: Progressing  Goal: Readiness for Transition of Care  Outcome: Progressing   Goal Outcome Evaluation:    "

## 2024-09-22 NOTE — PROGRESS NOTES
Brief OMFS Note    25 yo female with odontogenic infection and left mandibular fracture s/p ORIF of left mandibular fracture, I&D of left massticator space abscess, and extraction of teeth #'s 16 and 17 on 9/22/2024    Recommendations:  - No additional surgical interventions planned from OMFS perspective at this time  - OMFS will continue to follow   - Continue IV abx while inpatient, unasyn 3g Q6h  - Cultures pending  - HOB>30  - Peridex rinses BID  - Resume normal oral hygiene  - Ok for soft diet from OMFS perspective  - Pain management: scheduled tylenol 650 mg q6h, oxycodone 5mg prn and dilaudid 0.2-0.4 mg prn   - AM CBC and CRP

## 2024-09-22 NOTE — ANESTHESIA POSTPROCEDURE EVALUATION
Patient: Dunia Corley    Procedure: Procedure(s):  Open reduction internal fixation Left Mandable Angle Fracture, Application of Maxillodibullar Fixation, Irrigation and debridement of facial abscess via intra and extra oral approaches, Extraction of teeth x2  Incision and drainage neck, combined       Anesthesia Type:  General    Note:  Disposition: Inpatient   Postop Pain Control: Uneventful            Sign Out: Well controlled pain   PONV: No   Neuro/Psych: Uneventful            Sign Out: Acceptable/Baseline neuro status   Airway/Respiratory: Uneventful            Sign Out: Acceptable/Baseline resp. status   CV/Hemodynamics: Uneventful            Sign Out: Acceptable CV status; No obvious hypovolemia; No obvious fluid overload   Other NRE:    DID A NON-ROUTINE EVENT OCCUR?     Event details/Postop Comments:  Low HgB (5.9) on lab draw prior to procedure. No clinical indicators for acute drop. Plan was to redraw in OR.  This also returned low (5.5).     POCT check in OR and in line with admission HgB 9.7.  Vitals remain stable.     Anesthesia discussed with  and said both sample run on same machine and was another critical value questioned this AM.     Discussed with medicine hospitalist service and will continue to monitor.                Last vitals:  Vitals Value Taken Time   /91 09/22/24 1145   Temp 36.4  C (97.5  F) 09/22/24 1115   Pulse 57 09/22/24 1156   Resp 15 09/22/24 1156   SpO2 100 % 09/22/24 1156   Vitals shown include unfiled device data.    Electronically Signed By: Nils Culp MD  September 22, 2024  11:57 AM

## 2024-09-22 NOTE — OR NURSING
Dr. Culp notified of 5.6 hemoglobin. Orders to redraw, presumed to be poor sample. Will redraw in OR per Dr. Culp.

## 2024-09-22 NOTE — PLAN OF CARE
[id number]    Goal Outcome Evaluation:      Plan of Care Reviewed With: patient    Overall Patient Progress: improvingOverall Patient Progress: improving    Patient is A&O x4. Call light within reach. Able to make needs known. Independent in the room. Voids spontaneously to the bathroom. LBM: 096/19.    RA. NPO at midnight. R PIV AC, SL. C/o pain on L face, PRN Oxy given. Denies SOB, chest pain, n/v and n/t. L side face and upper lip swelling noted.     RN managed K. AM lab draws placed.     Possible for surgery tomorrow, 09/22. Continue with POC.

## 2024-09-22 NOTE — OP NOTE
Oral and Maxillofacial Surgery  Operative Note       Preoperative Diagnosis  Fracture of angle of left mandible, initial encounter for open fracture [S02.652B]    Postoperative Diagnosis  Same    Procedure(s):  Open reduction and internal fixation of left mandibular angle fracture   Extraction of teeth #'s 16 and 17  Incision and drainage of left oral abscess    Surgeon:  Juvencio Miner DDS, MD, FACS    Resident Surgeon(s):  Andrea Wilson DDS    Resident Assistants:  Benson Novak DDS    Other surgical staff (if any):  * No surgical staff found *    Anesthesia  Anesthesiologist: Nils Culp MD  CRNA: Sobia Grigsby APRN CRNA; Nieves Agudelo APRN CRNA  Anesthesia Resident: Jose Yao MD    EBL:   50 mL    Drains: None    Prosthetic Devices:   Implant Name Type Inv. Item Serial No.  Lot No. LRB No. Used Action   IMP SCR SYN MATRIX 2.0X06MM SELF TAP  [id number] - [id number] [employer]ware/[employer]ise IMP SCR SYN MATRIX 2.0X06MM SELF TAP  [phone]  [facility]  Left 3 Implanted   IMP SCR SYN MATRIX 2.0X06MM SELF TAP LOCKING  [phone] - [id number] Metallic Hardware/[employer]ise IMP SCR SYN MATRIX 2.0X06MM SELF TAP LOCKING  [phone]  [facility]  Left 5 Implanted   IMP PLATE SYN MATRIX MALLEABLE CVD 1.0MM 08H  [phone] - [id number] Metallic Hardware/Louise IMP PLATE SYN MATRIX MALLEABLE CVD 1.0MM 08H  [phone]  [facility]  Left 1 Implanted       Specimen Removed:   ID Type Source Tests Collected by Time Destination   A : Left Cheek Swab Tissue Cheek, Left ANAEROBIC BACTERIAL CULTURE ROUTINE, GRAM STAIN, AEROBIC BACTERIAL CULTURE ROUTINE Juvencio Miner DDS 9/22/2024  9:50 AM        Complications: none    Indications for Surgery:   25 yo female with left multiloculated sub masseteric abscess and left mandibular angle fracture as well as  Based on clinical and radiographic findings, the patient was planned for ORIF, I&D, and extraction of teeth #'s 16 and 17  in an OR setting. The procedure, benefits, risks, alternatives including no treatment were discussed in detail with the patient and the patient elected to proceed with the planned surgery.     Procedure description:   On the day of surgery, the patient was seen by myself and Dr. Miner in the pre-op holding area.  The procedure, benefits, risks, alternatives including no treatment were discussed again with the patient and an informed written consent was obtained for the planned procedure.  Patient was transported to the operating room and transferred to the OR table in a supine position.  Airway was secured via nasal tube by the anesthesia team.  Throat pack placed, oral cavity cleansed with chlorhexidine mouth rinse, 10cc 0.25%marcaine with epinephrine was used for infiltration of the left mandibular angle, left JASON and long buccal block.  The patient was prepped and draped in a sterile fashion for the planned procedure. Surgeons scrubbed and donned sterile attire. A surgical timeout was performed by all members of the team.     A 18 gauge syringe was used to aspirate purlenece from the left buccinator space intraorally and submitted for cultures.     Attention turned to the left mandibular angle intraorally. A total of four IMF screws were placed  between premolar and lateral canine region of each quadrant.  A [place] tip Bovie on 20/20 was used to make approximately 5 cm curvilinear incision extending from the ascending ramus and up to the external oblique ridge up to the bone involving the third molar site.  A full-thickness mucoperiosteal flap was elevated with a # 9 mucoperiosteal  elevator. ~30cc of purulence was expressed from fracture site at left mandibular angle. loculations were broken up with blunt finger dissection. Culture was obtained using culture swabs. The fracture was identified at the left angle extending through tooth #17. A #301 straight elevator used to luxate and deliver tooth #17. Socket's  were curetted and thoroughly irrigated.  Granulation tissue was removed from between the fractured segments.  Patient placed into MMF using 24 gauge wires. Fractured segments were reduced. A 15-blade was then used for a small stab incision through the skin of the left cheek and blunt dissection was performed into the oral cavity using a hemostat.  The trocar system was then introduced to allow for drilling of holes. The strut plate was aligned. Holes were drilled through the trocar access and the bony segments were secured with the palate and 8 screws along the angle of the mandible. The maxillomandibular fixation was released and the occlusion was verified and was found to be in maximum intercuspation. All 4 IMF screws were removed. The angle of mandible and other sites were irrigated thoroughly using normal saline.  Copious irrigation used.  The wound closure was achieved with 3-0 chromic gut suture in a fashion. The cheek incision was closed with 5-0 fast gut.       Next, attention was directed to tooth #16. Tooth #'s 16 was noted to be carious. A #301 straight elevator used to luxate and deliver tooth #16. Socket's were curetted and thoroughly irrigated.       Thus, the planned procedure completed, the throat pack was removed, the patient's care was given back to the anesthesia team.      I was told by the OR nurse staff that all needles, sponges, instruments were found to be correct and there were no intraoperative complications noted.     Attending staff was present for the entire duration of the procedure.    Andrea Wilson DDS  [facility] Resident, PGY-4

## 2024-09-22 NOTE — PROGRESS NOTES
PACU to Inpatient Nursing Handoff    Patient Dunia Corley is a 24 year old female who speaks English.   Procedure Procedure(s):  Open reduction internal fixation Left Mandable Angle Fracture, Application of Maxillodibullar Fixation, Irrigation and debridement of facial abscess via intra and extra oral approaches, Extraction of teeth x2  Incision and drainage neck, combined   Surgeon(s) Primary: Juvencio Miner DDS  Assisting: Benson Novak DDS  Resident - Assisting: Benson Novak DDS; Andrea Wilson MD     No Known Allergies    Isolation  No active isolations     Past Medical History   has no past medical history on file.    Anesthesia General   Dermatome Level     Preop Meds acetaminophen (Tylenol) - time given: 0745   Nerve block Not applicable   Intraop Meds dexamethasone (Decadron)  dexmedetomidine (Precedex): 20 mcg total  fentanyl (Sublimaze): 100 mcg total  hydromorphone (Dilaudid): 0.25 mg total  ketamine (Ketalar): 30 mg given  ketorolac (Toradol): last given at 1053  ondansetron (Zofran): last given at 1025   Local Meds Yes   Antibiotics Not applicable     Pain Patient Currently in Pain: yes   PACU meds  fentanyl (Sublimaze): 25 mcg (total dose) last given at 1127    PCA / epidural No   Capnography     Telemetry     Inpatient Telemetry Monitor Ordered? No        Labs Glucose Lab Results   Component Value Date     09/22/2024     09/22/2024    GLC 92 07/09/2018       Hgb Lab Results   Component Value Date    HGB 9.7 09/22/2024    HGB 5.5 09/22/2024       INR Lab Results   Component Value Date    INR 1.10 09/21/2024      PACU Imaging Not applicable     Wound/Incision Incision/Surgical Site 09/22/24 Left Mouth (Active)   Incision Assessment WDL except;UTV 09/22/24 1115   Closure Sutures 09/22/24 1115   Dressing Intervention Open to air / No Dressing 09/22/24 1115   Number of days: 0       Incision/Surgical Site 09/22/24 Left Cheek (Active)   Incision Assessment WDL 09/22/24 1115    Closure Approximated;Liquid bandage;Sutures 09/22/24 1115   Incision Drainage Amount None 09/22/24 1115   Dressing Intervention Open to air / No Dressing 09/22/24 1115   Number of days: 0      CMS        Equipment Jaw bra   Other LDA       IV Access Peripheral IV 09/20/24 Right Antecubital fossa (Active)   Site Assessment [facility] 09/22/24 0258   Line Status Saline locked 09/22/24 0258   Dressing Transparent 09/22/24 0258   Dressing Status clean;dry;intact 09/22/24 0258   Dressing Intervention Dressing reinforced 09/22/24 0258   Line Intervention Cap change;Flushed 09/22/24 0258   Phlebitis Scale 0-->no symptoms 09/22/24 0258   Infiltration? no 09/22/24 0258   Number of days: 2       Peripheral IV 09/22/24 Right Hand (Active)   Site Assessment [facility] 09/22/24 1115   Line Status Saline locked 09/22/24 1115   Dressing Transparent 09/22/24 1115   Dressing Status clean;dry;intact 09/22/24 1115   Phlebitis Scale 0-->no symptoms 09/22/24 1115   Number of days: 0      Blood Products Red blood cells available/return if not needed EBL 50 mL   Intake/Output Date 09/22/24 0700 - 09/23/24 0659   Shift 6784-2011 8344-4271 4030-4870 24 Hour Total   INTAKE   I.V. 800   800   Shift Total(mL/kg) 800(15.11)   800(15.11)   OUTPUT   Blood 50   50   Shift Total(mL/kg) 50(0.94)   50(0.94)   Weight (kg) 52.93 52.93 52.93 52.93      Drains / Zimmer     Time of void PreOp Time of Void Prior to Procedure: 0700 (09/22/24 0753)    PostOp Voided (mL): 500 mL (09/21/24 2300)    Diapered? No   Bladder Scan     PO    water     Vitals    B/P: (!) 148/91  T: 97.5  F (36.4  C)    Temp src: Axillary  P:  Pulse: 59 (09/22/24 1145)          R: 11  O2:  SpO2: 99 %    O2 Device: None (Room air) (09/22/24 1120)              Family/support present Mother and/or grandmother in patient room   Patient belongings     Patient transported on bed   DC meds/scripts (obs/outpt) Not applicable   Inpatient Pain Meds Released? Hospital policy to only release medication orders  for PACU       Special needs/considerations Surgeon suggest jaw bra without ice for support to help decrease swelling/pain be offered   Tasks needing completion OR staff was talking with lab about the varied Hgb results today. Assess if any recheck required or change in transfusion plans.       SERGIO RAINEY, RN  RADHA hong

## 2024-09-22 NOTE — ANESTHESIA CARE TRANSFER NOTE
Patient: Dunia Corley    Procedure: Procedure(s):  Open reduction internal fixation Left Mandable Angle Fracture, Application of Maxillodibullar Fixation, Irrigation and debridement of facial abscess via intra and extra oral approaches, Extraction of teeth x2  Incision and drainage neck, combined       Diagnosis: Fracture of angle of left mandible, initial encounter for open fracture [S02.652B]  Diagnosis Additional Information: No value filed.    Anesthesia Type:   General     Note:    Oropharynx: oropharynx clear of all foreign objects  Level of Consciousness: drowsy  Oxygen Supplementation: face mask  Level of Supplemental Oxygen (L/min / FiO2): 4  Independent Airway: airway patency satisfactory and stable    Vital Signs Stable: post-procedure vital signs reviewed and stable  Report to RN Given: handoff report given  Patient transferred to: PACU    Handoff Report: Identifed the Patient, Identified the Reponsible Provider, Reviewed the pertinent medical history, Discussed the surgical course, Reviewed Intra-OP anesthesia mangement and issues during anesthesia, Set expectations for post-procedure period and Allowed opportunity for questions and acknowledgement of understanding      Vitals:  Vitals Value Taken Time   /89    Temp 36.4    Pulse 71 09/22/24 1116   Resp 13 09/22/24 1116   SpO2 100 % 09/22/24 1116   Vitals shown include unfiled device data.    Electronically Signed By: JOSE ENRIQUE Huitron CRNA  September 22, 2024  11:16 AM

## 2024-09-22 NOTE — ANESTHESIA PROCEDURE NOTES
Airway       Patient location during procedure: OR       Procedure Start/Stop Times: 9/22/2024 9:15 AM  Staff -        Anesthesiologist:  Nils Culp MD       Resident/Fellow: Jose Yao MD       CRNA: Nieves Agudelo APRN CRNA       Performed By: resident  Consent for Airway        Urgency: elective  Indications and Patient Condition       Indications for airway management: conor-procedural       Induction type:intravenous       Mask difficulty assessment: 1 - vent by mask    Final Airway Details       Final airway type: endotracheal airway       Successful airway: Nasal and STEVEN  Endotracheal Airway Details        ETT size (mm): 6.5       Cuffed: yes       Successful intubation technique: video laryngoscopy       VL Blade Size: Glidescope 3 (S3)       Grade View of Cords: 1       Adjucts: stylet       Position: Right       Measured from: nares       Bite block used: None    Post intubation assessment        Placement verified by: capnometry, equal breath sounds and chest rise        Number of attempts at approach: 1       Secured with: tape       Ease of procedure: easy       Dentition: Intact and Unchanged (small amount of blood in back of throat)    Medication(s) Administered   Medication Administration Time: 9/22/2024 9:15 AM    Additional Comments       NAW x3 (22, 24, 26) with afrin and lube prior to intubation
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
## 2024-09-22 NOTE — ANESTHESIA PREPROCEDURE EVALUATION
Anesthesia Pre-Procedure Evaluation    Patient: Dunia Corley   MRN: [id number] : 2000        Procedure : Procedure(s):  Open reduction internal fixation mandible  Incision and drainage neck, combined          No past medical history on file.   No past surgical history on file.   No Known Allergies   Social History     Tobacco Use    Smoking status: Former     Types: Vaping Device    Smokeless tobacco: Not on file   Substance Use Topics    Alcohol use: Yes     Comment: Infrequently      Wt Readings from Last 1 Encounters:   24 52.9 kg (116 lb 11.2 oz)           Physical Exam    Airway   unable to assess   Comment: No speech, swallowing, breathing difficulty. Limited mouth opening.      TM distance: > 3 FB   Neck ROM: full   Mouth opening: < 3 cm    Respiratory Devices and Support         Dental       (+) Modest Abnormalities - crowns, retainers, 1 or 2 missing teeth      Cardiovascular   cardiovascular exam normal          Pulmonary   pulmonary exam normal            Other findings: Left sided facial swelling. CT scan reviewed.     OUTSIDE LABS:  CBC:   Lab Results   Component Value Date    WBC 14.4 (H) 2024    WBC 5.2 2018    HGB 9.7 (L) 2024    HGB 13.0 2018    HCT 29.3 (L) 2024    HCT 38.9 2018     2024     2018     BMP:   Lab Results   Component Value Date     2024     2018    POTASSIUM 4.6 2024    POTASSIUM 3.0 (L) 2024    CHLORIDE 100 2024    CHLORIDE 108 2018    CO2 24 2024    CO2 29 2018    BUN 11.4 2024    BUN 7 2018    CR 0.61 2024    CR 0.74 2018    GLC 91 2024    GLC 92 2018     COAGS:   Lab Results   Component Value Date    PTT 36 2024    INR 1.10 2024     POC:   Lab Results   Component Value Date    HCGS Negative 2024     HEPATIC:   Lab Results   Component Value Date    ALBUMIN 3.3 (L) 2024    PROTTOTAL  6.3 (L) 09/21/2024    ALT 8 09/21/2024    AST 12 09/21/2024    ALKPHOS 91 09/21/2024    BILITOTAL 0.3 09/21/2024     OTHER:   Lab Results   Component Value Date    EUGENE 8.5 (L) 09/21/2024    TSH 1.84 07/09/2018       Anesthesia Plan    ASA Status:  2    NPO Status:  NPO Appropriate    Anesthesia Type: General.     - Airway: ETT   Induction: Intravenous, Propofol.   Maintenance: Balanced.   Techniques and Equipment:     - Airway: Video-Laryngoscope, Fiberoptic Bronchoscope, Nasal STEVEN       Consents    Anesthesia Plan(s) and associated risks, benefits, and realistic alternatives discussed. Questions answered and patient/representative(s) expressed understanding.     - Discussed: Risks, Benefits and Alternatives for BOTH SEDATION and the PROCEDURE were discussed     - Discussed with:  Patient      - Extended Intubation/Ventilatory Support Discussed: No.      - Patient is DNR/DNI Status: No     Use of blood products discussed: No .     Postoperative Care    Pain management: IV analgesics, Oral pain medications, Multi-modal analgesia.   PONV prophylaxis: Ondansetron (or other 5HT-3), Dexamethasone or Solumedrol, Background Propofol Infusion     Comments:               Nils Culp MD    I have reviewed the pertinent notes and labs in the chart from the past 30 days and (re)examined the patient.  Any updates or changes from those notes are reflected in this note.    # Hypokalemia: Lowest K = 2.7 mmol/L in last 2 days, will replace as needed    # Hypocalcemia: Lowest Ca = 8.5 mg/dL in last 2 days, will monitor and replace as appropriate    # Hypoalbuminemia: Lowest albumin = 3.3 g/dL at 9/21/2024  9:25 AM, will monitor as appropriate

## 2024-09-23 LAB
ANION GAP SERPL CALCULATED.3IONS-SCNC: 10 MMOL/L (ref 7–15)
BUN SERPL-MCNC: 10.7 MG/DL (ref 6–20)
CALCIUM SERPL-MCNC: 8.7 MG/DL (ref 8.8–10.4)
CHLORIDE SERPL-SCNC: 103 MMOL/L (ref 98–107)
CREAT SERPL-MCNC: 0.59 MG/DL (ref 0.51–0.95)
CRP SERPL-MCNC: 95.67 MG/L
EGFRCR SERPLBLD CKD-EPI 2021: >90 ML/MIN/1.73M2
ERYTHROCYTE [DISTWIDTH] IN BLOOD BY AUTOMATED COUNT: 13.9 % (ref 10–15)
FOLATE SERPL-MCNC: 3.6 NG/ML (ref 4.6–34.8)
GLUCOSE SERPL-MCNC: 111 MG/DL (ref 70–99)
HCO3 SERPL-SCNC: 28 MMOL/L (ref 22–29)
HCT VFR BLD AUTO: 31 % (ref 35–47)
HGB BLD-MCNC: 10.2 G/DL (ref 11.7–15.7)
MCH RBC QN AUTO: 28.5 PG (ref 26.5–33)
MCHC RBC AUTO-ENTMCNC: 32.9 G/DL (ref 31.5–36.5)
MCV RBC AUTO: 87 FL (ref 78–100)
PLATELET # BLD AUTO: 554 10E3/UL (ref 150–450)
POTASSIUM SERPL-SCNC: 3.6 MMOL/L (ref 3.4–5.3)
RBC # BLD AUTO: 3.58 10E6/UL (ref 3.8–5.2)
SODIUM SERPL-SCNC: 141 MMOL/L (ref 135–145)
WBC # BLD AUTO: 17.8 10E3/UL (ref 4–11)

## 2024-09-23 PROCEDURE — 120N000002 HC R&B MED SURG/OB UMMC

## 2024-09-23 PROCEDURE — 80048 BASIC METABOLIC PNL TOTAL CA: CPT

## 2024-09-23 PROCEDURE — 250N000013 HC RX MED GY IP 250 OP 250 PS 637: Performed by: INTERNAL MEDICINE

## 2024-09-23 PROCEDURE — 99232 SBSQ HOSP IP/OBS MODERATE 35: CPT | Performed by: INTERNAL MEDICINE

## 2024-09-23 PROCEDURE — 85014 HEMATOCRIT: CPT

## 2024-09-23 PROCEDURE — 250N000011 HC RX IP 250 OP 636: Performed by: EMERGENCY MEDICINE

## 2024-09-23 PROCEDURE — 82746 ASSAY OF FOLIC ACID SERUM: CPT | Performed by: INTERNAL MEDICINE

## 2024-09-23 PROCEDURE — 36415 COLL VENOUS BLD VENIPUNCTURE: CPT

## 2024-09-23 PROCEDURE — 86140 C-REACTIVE PROTEIN: CPT | Performed by: INTERNAL MEDICINE

## 2024-09-23 PROCEDURE — 250N000011 HC RX IP 250 OP 636

## 2024-09-23 PROCEDURE — 250N000013 HC RX MED GY IP 250 OP 250 PS 637

## 2024-09-23 RX ADMIN — HYDROMORPHONE HYDROCHLORIDE 0.2 MG: 0.2 INJECTION, SOLUTION INTRAMUSCULAR; INTRAVENOUS; SUBCUTANEOUS at 04:07

## 2024-09-23 RX ADMIN — MIRTAZAPINE 15 MG: 15 TABLET, ORALLY DISINTEGRATING ORAL at 23:09

## 2024-09-23 RX ADMIN — AMPICILLIN SODIUM AND SULBACTAM SODIUM 3 G: 2; 1 INJECTION, POWDER, FOR SOLUTION INTRAMUSCULAR; INTRAVENOUS at 00:20

## 2024-09-23 RX ADMIN — AMPICILLIN SODIUM AND SULBACTAM SODIUM 3 G: 2; 1 INJECTION, POWDER, FOR SOLUTION INTRAMUSCULAR; INTRAVENOUS at 19:01

## 2024-09-23 RX ADMIN — AMPICILLIN SODIUM AND SULBACTAM SODIUM 3 G: 2; 1 INJECTION, POWDER, FOR SOLUTION INTRAMUSCULAR; INTRAVENOUS at 06:30

## 2024-09-23 RX ADMIN — CHLORHEXIDINE GLUCONATE, 0.12% ORAL RINSE 15 ML: 1.2 SOLUTION DENTAL at 19:42

## 2024-09-23 RX ADMIN — OXYCODONE HYDROCHLORIDE 5 MG: 5 TABLET ORAL at 17:00

## 2024-09-23 RX ADMIN — HYDROMORPHONE HYDROCHLORIDE 0.2 MG: 0.2 INJECTION, SOLUTION INTRAMUSCULAR; INTRAVENOUS; SUBCUTANEOUS at 09:01

## 2024-09-23 RX ADMIN — OXYCODONE HYDROCHLORIDE 5 MG: 5 TABLET ORAL at 05:06

## 2024-09-23 RX ADMIN — HYDROMORPHONE HYDROCHLORIDE 0.2 MG: 0.2 INJECTION, SOLUTION INTRAMUSCULAR; INTRAVENOUS; SUBCUTANEOUS at 01:01

## 2024-09-23 RX ADMIN — CHLORHEXIDINE GLUCONATE, 0.12% ORAL RINSE 15 ML: 1.2 SOLUTION DENTAL at 09:01

## 2024-09-23 RX ADMIN — ACETAMINOPHEN 650 MG: 325 TABLET, FILM COATED ORAL at 03:40

## 2024-09-23 RX ADMIN — OXYCODONE HYDROCHLORIDE 5 MG: 5 TABLET ORAL at 23:09

## 2024-09-23 RX ADMIN — HYDROMORPHONE HYDROCHLORIDE 0.2 MG: 0.2 INJECTION, SOLUTION INTRAMUSCULAR; INTRAVENOUS; SUBCUTANEOUS at 12:51

## 2024-09-23 RX ADMIN — AMPICILLIN SODIUM AND SULBACTAM SODIUM 3 G: 2; 1 INJECTION, POWDER, FOR SOLUTION INTRAMUSCULAR; INTRAVENOUS at 12:13

## 2024-09-23 RX ADMIN — OXYCODONE HYDROCHLORIDE 5 MG: 5 TABLET ORAL at 00:02

## 2024-09-23 ASSESSMENT — ACTIVITIES OF DAILY LIVING (ADL)
ADLS_ACUITY_SCORE: 18
ADLS_ACUITY_SCORE: 18
ADLS_ACUITY_SCORE: 20
ADLS_ACUITY_SCORE: 20
ADLS_ACUITY_SCORE: 18
ADLS_ACUITY_SCORE: 20
ADLS_ACUITY_SCORE: 20
ADLS_ACUITY_SCORE: 18
ADLS_ACUITY_SCORE: 20
ADLS_ACUITY_SCORE: 18
ADLS_ACUITY_SCORE: 20
ADLS_ACUITY_SCORE: 18
ADLS_ACUITY_SCORE: 20
ADLS_ACUITY_SCORE: 18

## 2024-09-23 NOTE — PLAN OF CARE
Goal Outcome Evaluation:      Plan of Care Reviewed With: patient    Overall Patient Progress: improvingOverall Patient Progress: improving    Outcome Evaluation: See progress note.    Pt A/Ox4, VSS on RA. Denies CP/SOB.  Independent in room, family member at bedside. Continent x2, voiding without difficulty.  Mechanical/soft texture diet, meds taken whole with thin liquids.  (R) hand PIV infusing abx x1 this shift.  Pt receiving PRN dilaudid and oxy for pain management.  Safety checks completed overnight. Rest/sleep promoted.   Facial swelling post-dental surgery. Eyes sore post-op, PRN eyedrops not given this shift.  RN managed K+, within target parameters, not replaced this shift.     Plan is to discharge home when medically ready. No acute events overnight, call light within reach able to make needs known. Continue POC.    Magda Franz RN
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
## 2024-09-23 NOTE — PROGRESS NOTES
Brief Medicine Cross Cover     Notified by nursing that patient having increased discomfort in R eye. Went and discussed with patient- no vision changes, after she woke up from surgery today she was cleaning her eye with a towel and thinks she scratched her cornea. She has had a corneal abrasion in the past and it feels the same as this. Pts face including both eyes appear swollen but pt reports swelling is improved from prior. Right eye is mildly red but no obvious abrasions, foreign bodies or ulcers, pupil was reactive to light. Able to move eye without pain.    - Optho consult already placed for AM  - Discussed with bedside nurse who is going to bring her some ice   - Continue current pain reg of PRN oxy with PRN HM for breakthrough- if not helping can make adjustments to pain plan  - Discussed with pt and grandmother if any vision changes, worsening eye discomfort, headache or new symptoms to please notify team       Clementine Sy PA-C

## 2024-09-23 NOTE — PROGRESS NOTES
CLINICAL NUTRITION SERVICES - ASSESSMENT NOTE     Nutrition Prescription    RECOMMENDATIONS FOR MDs/PROVIDERS TO ORDER:  None    Malnutrition Status:    Patient does not meet two of the established criteria necessary for diagnosing malnutrition    Recommendations already ordered by Registered Dietitian (RD):  Encouraged intakes, use of supplements  PRN supplements    Future/Additional Recommendations:  Monitor labs, intakes, and weight trends.     REASON FOR ASSESSMENT  Dunia Corley is a/an 24 year old female assessed by the dietitian for Admission Nutrition Screen positive for 2-13 lb wt loss and decreased appetite.    NUTRITION/MEDICAL HISTORY  Pt with hx of depression, admitted with 3 day history of worsening left-sided facial swelling and pain and was found on CT to have large multiloculated abscess involving the left masseter muscle and extending posterior to the left mandibular angle with probable early abscess involving the left medial pterygoid muscle. CT scan also showed nondisplaced left mandibular angle fracture. Patient was admitted to [place].Patient underwent open reduction and internal fixation of left mandibular angle fracture, extraction of teeth # 16 and 17 as well as incision and drainage of left oral abscess earlier today. Patient appears to have iron deficiency anemia.    FINDINGS  RD met with pt and grandmother at bedside. Pt reports she has not been eating well and losing weight since her jaw injury however has struggled to maintain an ideal weight for many years. Pt states she feels she needs to eat more than normal to be able to maintain weight or she will lose weight if she does not make an effort to eat a significant amount. Pt states her weight when she is eating without a focus on weight maintenance/gain is around 100-105 lb however she would like to weight closer to 130-140 lb. Pt states she is not an active person, she has no food allergies nor does she smoke or use other  "un-prescribed drugs. Pt states she was worried she was hyperthyroid given her symptoms however has checked and her thyroid levels have been normal. Pt has reached out to make an appointment at weight management clinic however they cancelled the appointment because the clinic focuses on weight loss not weight gain. Encouraged pt to request an outpatient dietitian referral and speak to her primary care provider regarding her concerns to determine if there is another possible cause for this. Pt's grandmother states both of the pt's siblings are very thin and it is likely a genetic trait. Explained that this is possible however pt should also share her concerns with her provider. Provided soft diet menu, list of supplements for pt to order PRN, handouts regarding high calorie high protein diet recommendations and recipes.     CURRENT NUTRITION ORDERS  Diet: Mechanical/Dental Soft    LABS   09/21/24 01:18 09/21/24 09:25 09/21/24 21:58 09/22/24 08:20 09/23/24 07:51   Sodium 136   137 141   Potassium 2.7 (L) 3.0 (L) 4.6 4.1 3.6   Urea Nitrogen 11.4   7.9 10.7   Creatinine 0.61   0.42 (L) 0.59   CRP Inflammation     95.67 (H)   Glucose 91   149 (H) 111 (H)      09/22/24 08:20   Ferritin 133   Iron 17 (L)   Iron Binding Capacity 271   Iron Sat Index 6 (L)     MEDICATIONS  Medications reviewed: decadron, remeron, potassium chloride, dilaudid and oxycodone PRN    ANTHROPOMETRICS  Height: 160 cm (5' 2.992\")  Most Recent Weight: 52.9 kg (116 lb 11.2 oz)    IBW: 52.3 kg (101% IBW)  BMI: Normal BMI  Weight History: Pt with limited weight history prior to admission, with 4 lb (3%) weight loss in 2 months.   09/21/24 52.9 kg (116 lb 11.2 oz)   09/20/24 51.2 kg (112 lb 12.8 oz)   07/17/24 54.8 kg (120 lb 12.8 oz)     Dosing Weight: 53 kg - most recent weight    ASSESSED NUTRITION NEEDS  Estimated Energy Needs: 8321-4283+ kcals/day 25 - 30 kcal/kg  Justification: Maintenance vs increased needs  Estimated Protein Needs: 42-53+ grams " protein/day (0.8 - 1 grams of pro/kg)  Justification: Maintenance   Estimated Fluid Needs: 1 ml/kcal or per provider pending fluid status    MALNUTRITION  % Intake: < 75% for > 7 days (moderate)  % Weight Loss: Weight loss does not meet criteria  Subcutaneous Fat Loss: None observed  Muscle Loss: None observed  Fluid Accumulation/Edema: None noted  Malnutrition Diagnosis: Patient does not meet two of the established criteria necessary for diagnosing malnutrition but is at risk for malnutrition    NUTRITION DIAGNOSIS  Predicted inadequate oral intake related to jaw fracture and possible increased needs as evidenced by patient report.     INTERVENTIONS  Implementation  Nutrition education for nutrition relationship to health/disease   Medical food supplement therapy     Goals  Patient to consume % of nutritionally adequate meal trays TID, or the equivalent with supplements/snacks.     Monitoring/Evaluation  Progress toward goals will be monitored and evaluated per protocol.    Brea Ulloa MS, RDN, LD  Clinical Dietitian  Available via phone and Vocera  Phone: [phone]  Weekend/Holiday Vocera: Weekend Holiday Clinical Dietitian [Multi Site Groups]

## 2024-09-23 NOTE — PROGRESS NOTES
ORAL & MAXILLOFACIALSURGERY   INPATIENT PROGRESS NOTE    Dunia Corley   : 2000   Sex: female   MRN: [id number]                  2024 7:18 AM    ASSESSMENT:  Dunia Corley is a 24 year old y.o. female POD1 s/p ORIF of left mandible, I&D of left facial abscess and extraction of teeth #16,17 in OR on 24. Patient is following expected post op course.    PLAN:  - No additional surgical interventions planned from OMFS perspective at this time  - OMFS will continue to follow while inpatient  - Continue IV abx while inpatient, unasyn 3g Q6h usually provides adequate coverage for oral polymicrobial infections   - Cultures pending  - HOB>30  - Warm packs to face, NO ICE  - Peridex rinses BID  - Resume normal oral hygiene  - Ok for soft diet from OMFS perspective  - Pain management per primary; appreciate cares    Plan of care d/w RN at the time of encounter, present at bedside.  Pt evaluated with OMS Chief Resident, Dr. Wilson, who will discuss the plan with Dr. Miner, attending.    Benson Novak DDS  Oral and Maxillofacial Surgery Resident PGY-2  __________________________________________________________  SUBJECTIVE:  Patient found resting comfortably in bed. Patient reports doing well. Reports pain is controlled on current regimen. Tolerating diet. Denies nausea or vomiting, chest pain or shortness of breath, fever or chills. Is ambulating w/o problems. Is voiding well. Patient requested she would like to stay another night if possible. Denies any other constitutional symptoms at this time.  .  OBJECTIVE:  VITALS:  Patient Vitals for the past 24 hrs:   BP Temp Temp src Pulse Resp SpO2   24 2135 109/64 98.1  F (36.7  C) Oral 57 18 98 %   24 1605 105/83 97.5  F (36.4  C) Oral 90 26 98 %   24 1531 -- 99.3  F (37.4  C) Oral -- -- --   24 1500 121/81 -- -- 72 11 98 %   24 1448 122/79 99.2  F (37.3  C) Axillary 79 17 97 %   24 1433 -- 96.8  F (36  C) Axillary  -- 20 --   09/22/24 1415 -- 98.6  F (37  C) Axillary -- 16 --   09/22/24 1407 -- (P) 99  F (37.2  C) (P) Axillary -- -- --   09/22/24 1300 117/69 -- -- 56 12 99 %   09/22/24 1245 116/64 -- -- 58 15 98 %   09/22/24 1230 122/71 -- -- 61 20 98 %   09/22/24 1215 124/77 -- -- 57 17 99 %   09/22/24 1200 (!) 145/84 97.4  F (36.3  C) Axillary 58 15 99 %   09/22/24 1145 (!) 148/91 -- -- 59 11 99 %   09/22/24 1130 (!) 155/88 -- -- 56 19 99 %   09/22/24 1120 -- -- -- 68 21 100 %   09/22/24 1115 (!) 138/94 97.5  F (36.4  C) Axillary 78 11 100 %   09/22/24 0746 116/73 98.4  F (36.9  C) Oral -- 16 99 %         Exam:  Neuro: alert, oriented, NAD, cranial nerves intact  HEENT: head normocephalic, atraumatic, edema consistent with postoperative findings on left side, TTP along inferior border of left mandible. Tissue is slightly softer from yesterday, still indurated at inferior border of L mandible.  Oral: edema as expected, hemostatic intact intraoral incisions, Sutures C/I, Hardware intact, SIMEON ~20mm. FOM NT/ND. Extraction sites are hemostatic.  Skin: no rashes or lesions. Dressing/Sutures c/d/i.   CV: no JVD appreciated, RRR   Pulm: b/l = chest rise, breathing easily   Abd: non distended, non tender, soft   Ext: warm, well-perfused    Meds:  Current Facility-Administered Medications   Medication Dose Route Frequency Provider Last Rate Last Admin    acetaminophen (TYLENOL) tablet 650 mg  650 mg Oral Q4H PRN Madai Briseno PA-C   650 mg at 09/23/24 0340    ampicillin-sulbactam (UNASYN) 3 g vial to attach to  mL bag  3 g Intravenous Q6H Maribell Johnson  mL/hr at 09/23/24 0630 3 g at 09/23/24 0630    carboxymethylcellulose PF (REFRESH PLUS) 0.5 % ophthalmic solution 1 drop  1 drop Right Eye Q1H PRN Russell Herrera MD   1 drop at 09/22/24 2126    chlorhexidine (PERIDEX) 0.12 % solution 15 mL  15 mL Swish & Spit BID Russell Herrera MD   15 mL at 09/22/24 2126    HYDROmorphone (DILAUDID) injection 0.2 mg  0.2 mg Intravenous  Q3H PRN Madai Briseno PA-C   0.2 mg at 09/23/24 0407    lidocaine (LMX4) cream   Topical Q1H PRN Madai Briseno PA-C        lidocaine 1 % 0.1-1 mL  0.1-1 mL Other Q1H PRN Madai Briseno PA-C        mirtazapine (REMERON SOL-TAB) ODT tab 15 mg  15 mg Orally disintegrating tablet At Bedtime Russell Herrera MD   15 mg at 09/22/24 2126    oxyCODONE (ROXICODONE) tablet 5 mg  5 mg Oral Q4H PRN Madai Briseno PA-C   5 mg at 09/23/24 0506    sodium chloride (PF) 0.9% PF flush 3 mL  3 mL Intracatheter Q8H Madai Briseno PA-C   3 mL at 09/22/24 1927    sodium chloride (PF) 0.9% PF flush 3 mL  3 mL Intracatheter q1 min prn Madai Briseno PA-C          Labs:    Admission on 09/21/2024   Component Date Value Ref Range Status    Potassium 09/21/2024 3.0 (L)  3.4 - 5.3 mmol/L Final    INR 09/21/2024 1.10  0.85 - 1.15 Final    aPTT 09/21/2024 36  22 - 38 Seconds Final    Protein Total 09/21/2024 6.3 (L)  6.4 - 8.3 g/dL Final    Albumin 09/21/2024 3.3 (L)  3.5 - 5.2 g/dL Final    Bilirubin Total 09/21/2024 0.3  <=1.2 mg/dL Final    Alkaline Phosphatase 09/21/2024 91  40 - 150 U/L Final    AST 09/21/2024 12  0 - 45 U/L Final    ALT 09/21/2024 8  0 - 50 U/L Final    Bilirubin Direct 09/21/2024 <0.20  0.00 - 0.30 mg/dL Final    Acetaminophen 09/21/2024 <5.0 (L)  10.0 - 30.0 ug/mL Final    Culture 09/21/2024 No growth after 1 day   Preliminary    Potassium 09/21/2024 4.6  3.4 - 5.3 mmol/L Final    Sodium 09/22/2024 137  135 - 145 mmol/L Final    Potassium 09/22/2024 4.1  3.4 - 5.3 mmol/L Final    Chloride 09/22/2024 102  98 - 107 mmol/L Final    Carbon Dioxide (CO2) 09/22/2024 25  22 - 29 mmol/L Final    Anion Gap 09/22/2024 10  7 - 15 mmol/L Final    Urea Nitrogen 09/22/2024 7.9  6.0 - 20.0 mg/dL Final    Creatinine 09/22/2024 0.42 (L)  0.51 - 0.95 mg/dL Final    GFR Estimate 09/22/2024 >90  >60 mL/min/1.73m2 Final    eGFR calculated using 2021 CKD-EPI equation.    Calcium 09/22/2024 8.7 (L)  8.8 - 10.4 mg/dL  Final    Reference intervals for this test were updated on 7/16/2024 to reflect our healthy population more accurately. There may be differences in the flagging of prior results with similar values performed with this method. Those prior results can be interpreted in the context of the updated reference intervals.    Glucose 09/22/2024 149 (H)  70 - 99 mg/dL Final    WBC Count 09/22/2024 12.7 (H)  4.0 - 11.0 10e3/uL Final    RBC Count 09/22/2024 2.06 (L)  3.80 - 5.20 10e6/uL Final    Hemoglobin 09/22/2024 5.9 (LL)  11.7 - 15.7 g/dL Final    Hematocrit 09/22/2024 17.6 (L)  35.0 - 47.0 % Final    MCV 09/22/2024 85  78 - 100 fL Final    MCH 09/22/2024 28.6  26.5 - 33.0 pg Final    MCHC 09/22/2024 33.5  31.5 - 36.5 g/dL Final    RDW 09/22/2024 13.8  10.0 - 15.0 % Final    Platelet Count 09/22/2024 617 (H)  150 - 450 10e3/uL Final    WBC Count 09/22/2024 10.8  4.0 - 11.0 10e3/uL Final    RBC Count 09/22/2024 1.89 (L)  3.80 - 5.20 10e6/uL Final    Hemoglobin 09/22/2024 5.5 (LL)  11.7 - 15.7 g/dL Final    Hematocrit 09/22/2024 15.8 (L)  35.0 - 47.0 % Final    MCV 09/22/2024 84  78 - 100 fL Final    MCH 09/22/2024 29.1  26.5 - 33.0 pg Final    MCHC 09/22/2024 34.8  31.5 - 36.5 g/dL Final    RDW 09/22/2024 14.1  10.0 - 15.0 % Final    Platelet Count 09/22/2024 600 (H)  150 - 450 10e3/uL Final    ABO/RH(D) 09/22/2024 O POS   Final    Antibody Screen 09/22/2024 Negative  Negative Final    SPECIMEN EXPIRATION DATE 09/22/2024 [id number]   Final    GS Culture 09/22/2024 See corresponding culture for results   Final    Gram Stain Result 09/22/2024 2+ Gram positive cocci (A)   Final    Gram Stain Result 09/22/2024 1+ Gram negative bacilli (A)   Final    Gram Stain Result 09/22/2024 4+ WBC seen (A)   Final    Predominantly PMNs    Ferritin 09/22/2024 133  6 - 175 ng/mL Final    Iron 09/22/2024 17 (L)  37 - 145 ug/dL Final    Iron Binding Capacity 09/22/2024 271  240 - 430 ug/dL Final    Iron Sat Index 09/22/2024 6 (L)  15 - 46 %  Final    Lactate Dehydrogenase 09/22/2024 145  0 - 250 U/L Final    % Reticulocyte 09/22/2024 0.8  0.5 - 2.0 % Final    Absolute Reticulocyte 09/22/2024 0.015 (L)  0.025 - 0.095 10e6/uL Final    Vitamin B12 09/22/2024 1,464 (H)  232 - 1,245 pg/mL Final    Protein Total 09/22/2024 6.7  6.4 - 8.3 g/dL Final    Albumin 09/22/2024 3.6  3.5 - 5.2 g/dL Final    Bilirubin Total 09/22/2024 0.2  <=1.2 mg/dL Final    Alkaline Phosphatase 09/22/2024 83  40 - 150 U/L Final    AST 09/22/2024 10  0 - 45 U/L Final    ALT 09/22/2024 7  0 - 50 U/L Final    Bilirubin Direct 09/22/2024 <0.20  0.00 - 0.30 mg/dL Final    Haptoglobin 09/22/2024 347 (H)  30 - 200 mg/dL Final    Blood Component Type 09/22/2024 Red Blood Cells   Preliminary    Product Code 09/22/2024 I5394O83   Preliminary    Unit Status 09/22/2024 Returned   Preliminary    Unit Number 09/22/2024 [id number]   Preliminary    CROSSMATCH 09/22/2024 Compatible   Preliminary    CODING SYSTEM 09/22/2024 [id number]   Preliminary    ISSUE DATE AND TIME 09/22/2024 [id number]   Preliminary    UNIT ABO/RH 09/22/2024 O+   Preliminary    UNIT TYPE ISBT 09/22/2024 5100   Preliminary    Blood Component Type 09/22/2024 Red Blood Cells   Preliminary    Product Code 09/22/2024 [id number]   Preliminary    Unit Status 09/22/2024 Returned   Preliminary    Unit Number 09/22/2024 [id number]   Preliminary    CROSSMATCH 09/22/2024 Compatible   Preliminary    CODING SYSTEM 09/22/2024 [id number]   Preliminary    ISSUE DATE AND TIME 09/22/2024 [id number]   Preliminary    UNIT ABO/RH 09/22/2024 O+   Preliminary    UNIT TYPE ISBT 09/22/2024 5100   Preliminary    pH Venous POCT 09/22/2024 7.46 (H)  7.32 - 7.43 Final    CRITICAL RESULTS DOCUMENTED BY CATH STAFF AND MD    pCO2 Venous POCT 09/22/2024 36 (L)  40 - 50 mm Hg Final    CRITICAL RESULTS DOCUMENTED BY CATH STAFF AND MD    pO2 Venous POCT 09/22/2024 217 (H)  25 - 47 mm Hg Final    CRITICAL RESULTS DOCUMENTED BY CATH STAFF AND MD     Bicarbonate Venous POCT 09/22/2024 26  21 - 28 mmol/L Final    CRITICAL RESULTS DOCUMENTED BY CATH STAFF AND MD    Sodium POCT 09/22/2024 139  135 - 145 mmol/L Final    CRITICAL RESULTS DOCUMENTED BY CATH STAFF AND MD    Potassium POCT 09/22/2024 4.5  3.4 - 5.3 mmol/L Final    CRITICAL RESULTS DOCUMENTED BY CATH STAFF AND MD    Hemoglobin POCT 09/22/2024 9.7 (L)  11.7 - 15.7 g/dL Final    CRITICAL RESULTS DOCUMENTED BY CATH STAFF AND MD    Oxyhemoglobin Venous POCT 09/22/2024 99 (H)  70 - 75 % Final    CRITICAL RESULTS DOCUMENTED BY CATH STAFF AND MD    Glucose Whole Blood POCT 09/22/2024 160 (H)  70 - 99 mg/dL Final    CRITICAL RESULTS DOCUMENTED BY CATH STAFF AND MD    Base Excess/Deficit (+/-) POCT 09/22/2024 1.9  -3.0 - 3.0 mmol/L Final    CRITICAL RESULTS DOCUMENTED BY CATH STAFF AND MD    Calcium, Ionized Whole Blood POCT 09/22/2024 4.6  4.4 - 5.2 mg/dL Final    CRITICAL RESULTS DOCUMENTED BY CATH STAFF AND MD    O2 Sat, Venous POCT 09/22/2024 100 (H)  70 - 75 % Final    CRITICAL RESULTS DOCUMENTED BY CATH STAFF AND MD    Lactic Acid POCT 09/22/2024 0.9  0.7 - 2.0 mmol/L Final    CRITICAL RESULTS DOCUMENTED BY CATH STAFF AND MD    FIO2 POCT 09/22/2024 90.0  % Final    Hemoglobin 09/22/2024 10.3 (L)  11.7 - 15.7 g/dL Final       Imaging:    Results for orders placed or performed during the hospital encounter of 09/20/24   Soft tissue neck CT w contrast    Narrative    EXAM: CT SOFT TISSUE NECK W CONTRAST  LOCATION: [facility]  DATE: 9/21/2024    INDICATION: 4 days of left sided facial swelling going down towards her left jaw and neck, likely from dental abscess.  COMPARISON: None.  CONTRAST: ISOVUE 370 80ML  TECHNIQUE: Routine CT Soft Tissue Neck with IV contrast. Multiplanar reformats. Dose reduction techniques were used.    FINDINGS:     MUCOSAL SPACES/SOFT TISSUES: Nondisplaced fracture left mandibular angle involves the to the socket for the third mandibular molar. Multiloculated  abscess in the asymmetrically enlarged left masseter muscle and extending posterior to the mandibular angle   measures 4.6 cm AP by 2.5 cm transverse by 3.5 cm craniocaudal. There is probably also an early ill-defined abscess involving the asymmetrically enlarged left medial pterygoid muscle. There is significant surrounding soft tissue swelling and   subcutaneous edema in the left buccal region which extends into the left submandibular space. There is also edema extending into the medially along the anterior margin of the sternocleidomastoid muscle into the parapharyngeal space. Normal mucosal spaces   of the upper aerodigestive tract. No mucosal mass or inflammation identified. Normal vocal cords and infraglottic trachea. Normal oral cavity,  spaces, and floor of mouth structures. Dental caries involving the left third maxillary molar.    LYMPH NODES: Prominent reactive left submandibular lymph nodes measure up to 0.9 cm in short axis.     SALIVARY GLANDS: Normal parotid and submandibular glands.    THYROID: Normal.     VESSELS: Vascular structures of the neck are patent.    VISUALIZED INTRACRANIAL/ORBITS/SINUSES: No abnormality of the visualized intracranial compartment. Small old left medial orbital wall blowout fracture. Otherwise orbits unremarkable. Visualized paranasal sinuses and mastoid air cells are clear.    OTHER: No destructive osseous lesion. The included lung apices are clear.      Impression    IMPRESSION:   1.  Large multiloculated abscess involving the left masseter muscle and extending posterior to the left mandibular angle with probable early abscess involving the left medial pterygoid muscle. Surrounding cellulitis and reactive edema as described above.  2.  Abscess is likely due to spread of oral bacteria through the nondisplaced left mandibular angle fracture which involves the tooth socket for the third mandibular molar.    3.  The above findings were discussed directly with   Wall at 2:40 AM CST on 09/21/2024.

## 2024-09-23 NOTE — PLAN OF CARE
"  Problem: Adult Inpatient Plan of Care  Goal: Plan of Care Review  Description: The Plan of Care Review/Shift note should be completed every shift.  The Outcome Evaluation is a brief statement about your assessment that the patient is improving, declining, or no change.  This information will be displayed automatically on your shift  note.  Outcome: Progressing  Flowsheets (Taken 9/22/2024 2018)  Plan of Care Reviewed With: patient  Overall Patient Progress: improving  Goal: Patient-Specific Goal (Individualized)  Description: You can add care plan individualizations to a care plan. Examples of Individualization might be:  \"Parent requests to be called daily at 9am for status\", \"I have a hard time hearing out of my right ear\", or \"Do not touch me to wake me up as it startles  me\".  Outcome: Progressing  Goal: Absence of Hospital-Acquired Illness or Injury  Outcome: Progressing  Intervention: Identify and Manage Fall Risk  Recent Flowsheet Documentation  Taken 9/22/2024 1538 by Shaila Beltran, RN  Safety Promotion/Fall Prevention:   clutter free environment maintained   lighting adjusted   nonskid shoes/slippers when out of bed   room near nurse's station  Intervention: Prevent Skin Injury  Recent Flowsheet Documentation  Taken 9/22/2024 1538 by Shaila Beltran, RN  Body Position: supine, head elevated  Intervention: Prevent Infection  Recent Flowsheet Documentation  Taken 9/22/2024 1538 by Shaila Beltran, RN  Infection Prevention:   equipment surfaces disinfected   hand hygiene promoted   rest/sleep promoted   single patient room provided  Goal: Optimal Comfort and Wellbeing  Outcome: Progressing  Goal: Readiness for Transition of Care  Outcome: Progressing   Goal Outcome Evaluation:      Plan of Care Reviewed With: patient    Overall Patient Progress: improvingOverall Patient Progress: improving        VS: Vital signs:  Temp: 98.1  F (36.7  C) Temp src: Oral BP: 109/64 Pulse: 57   Resp: 18 SpO2: 98 % O2 Device: " "None (Room air)   Height: 160 cm (5' 2.99\") Weight: 52.9 kg (116 lb 11.2 oz)  Estimated body mass index is 20.68 kg/m  as calculated from the following:    Height as of this encounter: 1.6 m (5' 2.99\").    Weight as of this encounter: 52.9 kg (116 lb 11.2 oz).        O2: Sating >95% on RA, denies SOB/Chest pain   Output: Voids spontaneously in bathroom   Last BM: 09/20/24 bowel sounds normoactive x4   Activity: Up independently in room, grandma at bedside   Up for meals? Yes   Skin: All visible skin intact   Pain: Pain was managed with PRN oxycodone and dilaudid iv as well as ice packs and eyedrops   CMS: AO x4, Denies N/V   Dressing: None   Diet: Soft dental diet   LDA: PIV to R hand    Equipment:  IV pole, personal belongings   Plan: Call light within reach, bed in a low position. Able to make needs known. Continue to monitor with POC.   Additional Info:  plan is for patient to discharge home when able RN managed labs to be drawn in the AM          "

## 2024-09-23 NOTE — PLAN OF CARE
"  Problem: Adult Inpatient Plan of Care  Goal: Plan of Care Review  Description: T  VS:  /64 (BP Location: Left arm)   Pulse 67   Temp 99.6  F (37.6  C) (Oral)   Resp 18   Ht 1.6 m (5' 2.99\")   Wt 52.9 kg (116 lb 11.2 oz)   SpO2 98%   BMI 20.68 kg/m      O2:  Room Air    Output:  Continent of Bowel and Bladder    Last BM:  09/20/24   Activity:  Independent    Up for meals?  Yes    Skin:  Old Bruising    Pain:  Yes    CMS:  A&OX 3-4   Dressing:  Yes    Diet:  Mechanical Dental    LDA:  Right PIV SL   Equipment:  Personal Belongings    Plan:  POC   Additional Info:   Fracture of Angle of Left Mandible       Pain Management   Dilaudid 0.2 mg      Hemoglobin      Procedure(s): 09/22/24 0700 am     Open reduction internal fixation Left Mandable Angle Fracture, Application of Maxillodibullar Fixation  Irrigation and debridement of facial abscess via intra and extra oral approaches  Extraction of teeth x2  Incision and drainage neck, comb    Potassium 3.6    Hemoglobin  10.2    Outcome: Progressing  Goal: Patient-Specific Goal (Individualized)  Description: Discharge to Home     Outcome: Progressing  Goal: Absence of Hospital-Acquired Illness or Injury  Outcome: Progressing  Intervention: Identify and Manage Fall Risk  Recent Flowsheet Documentation  Taken 9/23/2024 0781 by Kayla Bowman, RN  Safety Promotion/Fall Prevention:   clutter free environment maintained   lighting adjusted   nonskid shoes/slippers when out of bed   room near nurse's station   safety round/check completed  Goal: Optimal Comfort and Wellbeing  Outcome: Progressing  Goal: Readiness for Transition of Care  Outcome: Progressing   Goal Outcome Evaluation:      "

## 2024-09-23 NOTE — PLAN OF CARE
"Goal Outcome Evaluation:    Shift:4190 - 1000      Plan of Care Reviewed With: patient    Overall Patient Progress: no changeOverall Patient Progress: no change    /79 (BP Location: Right arm)   Pulse 74   Temp 98.6  F (37  C) (Axillary)   Resp 18   Ht 1.6 m (5' 2.99\")   Wt 52.9 kg (116 lb 11.2 oz)   SpO2 100%   BMI 20.68 kg/m      Outcome Evaluation: Patient is alert and oriented No acute change or events; VSS, Denies shortness of breath,chest pain, numbness/tingling, nausea/vomiting, no fever or chills. Pt reports 9/10 pain managed with  PRN oxycodone. Family/ significant other is at bedside. Pt is able to make needs know, call light is in reach, continue with POC.      "

## 2024-09-24 LAB
ANION GAP SERPL CALCULATED.3IONS-SCNC: 9 MMOL/L (ref 7–15)
BUN SERPL-MCNC: 7.2 MG/DL (ref 6–20)
CALCIUM SERPL-MCNC: 8.9 MG/DL (ref 8.8–10.4)
CHLORIDE SERPL-SCNC: 102 MMOL/L (ref 98–107)
CREAT SERPL-MCNC: 0.58 MG/DL (ref 0.51–0.95)
EGFRCR SERPLBLD CKD-EPI 2021: >90 ML/MIN/1.73M2
ERYTHROCYTE [DISTWIDTH] IN BLOOD BY AUTOMATED COUNT: 13.9 % (ref 10–15)
EST. AVERAGE GLUCOSE BLD GHB EST-MCNC: 111 MG/DL
GLUCOSE BLDC GLUCOMTR-MCNC: 107 MG/DL (ref 70–99)
GLUCOSE SERPL-MCNC: 93 MG/DL (ref 70–99)
HBA1C MFR BLD: 5.5 %
HCO3 SERPL-SCNC: 30 MMOL/L (ref 22–29)
HCT VFR BLD AUTO: 34.4 % (ref 35–47)
HGB BLD-MCNC: 11.1 G/DL (ref 11.7–15.7)
MCH RBC QN AUTO: 28 PG (ref 26.5–33)
MCHC RBC AUTO-ENTMCNC: 32.3 G/DL (ref 31.5–36.5)
MCV RBC AUTO: 87 FL (ref 78–100)
PLATELET # BLD AUTO: 538 10E3/UL (ref 150–450)
POTASSIUM SERPL-SCNC: 3.6 MMOL/L (ref 3.4–5.3)
RBC # BLD AUTO: 3.96 10E6/UL (ref 3.8–5.2)
SODIUM SERPL-SCNC: 141 MMOL/L (ref 135–145)
WBC # BLD AUTO: 7.8 10E3/UL (ref 4–11)

## 2024-09-24 PROCEDURE — 36415 COLL VENOUS BLD VENIPUNCTURE: CPT

## 2024-09-24 PROCEDURE — 250N000011 HC RX IP 250 OP 636: Performed by: INTERNAL MEDICINE

## 2024-09-24 PROCEDURE — 250N000013 HC RX MED GY IP 250 OP 250 PS 637

## 2024-09-24 PROCEDURE — 99223 1ST HOSP IP/OBS HIGH 75: CPT | Mod: FS | Performed by: PHYSICIAN ASSISTANT

## 2024-09-24 PROCEDURE — 99232 SBSQ HOSP IP/OBS MODERATE 35: CPT | Performed by: INTERNAL MEDICINE

## 2024-09-24 PROCEDURE — 85014 HEMATOCRIT: CPT

## 2024-09-24 PROCEDURE — 99222 1ST HOSP IP/OBS MODERATE 55: CPT | Performed by: PHYSICIAN ASSISTANT

## 2024-09-24 PROCEDURE — 250N000013 HC RX MED GY IP 250 OP 250 PS 637: Performed by: INTERNAL MEDICINE

## 2024-09-24 PROCEDURE — 82947 ASSAY GLUCOSE BLOOD QUANT: CPT

## 2024-09-24 PROCEDURE — 250N000011 HC RX IP 250 OP 636: Performed by: EMERGENCY MEDICINE

## 2024-09-24 PROCEDURE — 83036 HEMOGLOBIN GLYCOSYLATED A1C: CPT | Performed by: INTERNAL MEDICINE

## 2024-09-24 PROCEDURE — 80048 BASIC METABOLIC PNL TOTAL CA: CPT

## 2024-09-24 PROCEDURE — 250N000011 HC RX IP 250 OP 636

## 2024-09-24 PROCEDURE — 258N000003 HC RX IP 258 OP 636: Performed by: INTERNAL MEDICINE

## 2024-09-24 PROCEDURE — 120N000002 HC R&B MED SURG/OB UMMC

## 2024-09-24 RX ORDER — NALOXONE HYDROCHLORIDE 0.4 MG/ML
0.2 INJECTION, SOLUTION INTRAMUSCULAR; INTRAVENOUS; SUBCUTANEOUS
Status: DISCONTINUED | OUTPATIENT
Start: 2024-09-24 | End: 2024-09-30 | Stop reason: HOSPADM

## 2024-09-24 RX ORDER — NALOXONE HYDROCHLORIDE 0.4 MG/ML
0.4 INJECTION, SOLUTION INTRAMUSCULAR; INTRAVENOUS; SUBCUTANEOUS
Status: DISCONTINUED | OUTPATIENT
Start: 2024-09-24 | End: 2024-09-30 | Stop reason: HOSPADM

## 2024-09-24 RX ADMIN — AMPICILLIN SODIUM AND SULBACTAM SODIUM 3 G: 2; 1 INJECTION, POWDER, FOR SOLUTION INTRAMUSCULAR; INTRAVENOUS at 00:39

## 2024-09-24 RX ADMIN — HYDROMORPHONE HYDROCHLORIDE 0.2 MG: 0.2 INJECTION, SOLUTION INTRAMUSCULAR; INTRAVENOUS; SUBCUTANEOUS at 21:31

## 2024-09-24 RX ADMIN — OXYCODONE HYDROCHLORIDE 5 MG: 5 TABLET ORAL at 11:31

## 2024-09-24 RX ADMIN — HYDROMORPHONE HYDROCHLORIDE 0.2 MG: 0.2 INJECTION, SOLUTION INTRAMUSCULAR; INTRAVENOUS; SUBCUTANEOUS at 05:58

## 2024-09-24 RX ADMIN — AMPICILLIN SODIUM AND SULBACTAM SODIUM 3 G: 2; 1 INJECTION, POWDER, FOR SOLUTION INTRAMUSCULAR; INTRAVENOUS at 11:34

## 2024-09-24 RX ADMIN — AMPICILLIN SODIUM AND SULBACTAM SODIUM 3 G: 2; 1 INJECTION, POWDER, FOR SOLUTION INTRAMUSCULAR; INTRAVENOUS at 18:09

## 2024-09-24 RX ADMIN — HYDROMORPHONE HYDROCHLORIDE 0.2 MG: 0.2 INJECTION, SOLUTION INTRAMUSCULAR; INTRAVENOUS; SUBCUTANEOUS at 18:09

## 2024-09-24 RX ADMIN — VANCOMYCIN HYDROCHLORIDE 750 MG: 1 INJECTION, POWDER, LYOPHILIZED, FOR SOLUTION INTRAVENOUS at 23:36

## 2024-09-24 RX ADMIN — AMPICILLIN SODIUM AND SULBACTAM SODIUM 3 G: 2; 1 INJECTION, POWDER, FOR SOLUTION INTRAMUSCULAR; INTRAVENOUS at 05:54

## 2024-09-24 RX ADMIN — HYDROMORPHONE HYDROCHLORIDE 0.2 MG: 0.2 INJECTION, SOLUTION INTRAMUSCULAR; INTRAVENOUS; SUBCUTANEOUS at 09:04

## 2024-09-24 RX ADMIN — OXYCODONE HYDROCHLORIDE 5 MG: 5 TABLET ORAL at 19:59

## 2024-09-24 RX ADMIN — MIRTAZAPINE 15 MG: 15 TABLET, ORALLY DISINTEGRATING ORAL at 21:31

## 2024-09-24 RX ADMIN — HYDROMORPHONE HYDROCHLORIDE 0.2 MG: 0.2 INJECTION, SOLUTION INTRAMUSCULAR; INTRAVENOUS; SUBCUTANEOUS at 12:33

## 2024-09-24 RX ADMIN — HYDROMORPHONE HYDROCHLORIDE 0.2 MG: 0.2 INJECTION, SOLUTION INTRAMUSCULAR; INTRAVENOUS; SUBCUTANEOUS at 00:39

## 2024-09-24 RX ADMIN — CHLORHEXIDINE GLUCONATE, 0.12% ORAL RINSE 15 ML: 1.2 SOLUTION DENTAL at 19:59

## 2024-09-24 RX ADMIN — VANCOMYCIN HYDROCHLORIDE 750 MG: 1 INJECTION, POWDER, LYOPHILIZED, FOR SOLUTION INTRAVENOUS at 15:47

## 2024-09-24 RX ADMIN — CHLORHEXIDINE GLUCONATE, 0.12% ORAL RINSE 15 ML: 1.2 SOLUTION DENTAL at 09:04

## 2024-09-24 RX ADMIN — OXYCODONE HYDROCHLORIDE 5 MG: 5 TABLET ORAL at 23:43

## 2024-09-24 RX ADMIN — OXYCODONE HYDROCHLORIDE 5 MG: 5 TABLET ORAL at 15:45

## 2024-09-24 ASSESSMENT — ACTIVITIES OF DAILY LIVING (ADL)
ADLS_ACUITY_SCORE: 20

## 2024-09-24 NOTE — PROGRESS NOTES
Brief Care Management Note:    Writer notified pt requested to speak with care management. Writer met with pt at bedside who expressed concern her MA was going to lapse at the end of the month because she had not received her renewal paperwork due to a change in address. SW previously placed a FC referral. Likely has not yet been completed because it is not an imminent barrier to discharge. Writer provided paper copy of application and pt stated she will complete and return.       Glenis Bob, RN   Nurse Coordinator    6 Med/Surg  Phone: [phone]    Social Work and Care Management Department     SEARCHABLE in [facility] - search CARE COORDINATOR   [facility] ([id number]) Saturday & Sunday; ([id number]) [facility] Recognized Holidays   Units: 6 Med Surg Vocera & 8 Med Surg Vocera Pager [phone]

## 2024-09-24 NOTE — PLAN OF CARE
"Goal Outcome Evaluation:      Plan of Care Reviewed With: patient    Overall Patient Progress: no changeOverall Patient Progress: no change      A&O x 4, independent, VSS, no acute changes this shift. Continent of B&B. LBM 09/23 per pt. LPIV flsuhed, capped, patent & CDI. Denies SOB and chest. C/O intermittent pain 8/10 in L cheek, pain managed with PRN oxy, IV dilaudid, and heat packs. Pt able to make needs known, bed low, locked, and call light within reach. Will continue POC.      Additional Info: On IV abx Q6H    /80 (BP Location: Right arm)   Pulse 77   Temp 99.9  F (37.7  C) (Oral)   Resp 18   Ht 1.6 m (5' 2.99\")   Wt 52.9 kg (116 lb 11.2 oz)   SpO2 100%   BMI 20.68 kg/m          "

## 2024-09-24 NOTE — PLAN OF CARE
Goal Outcome Evaluation:      Plan of Care Reviewed With: patient    Overall Patient Progress: no changeOverall Patient Progress: no change    VSS on RA. Patient is alert and oriented x3, uses call light appropriately. Denies shortness of breath,chest pain, numbness/tingling, nausea/vomiting, no fever or chills. Pain is being managed with  PRN dilaudid. Family at bedside. No acute change/events; call light within reach, continue with POC.

## 2024-09-24 NOTE — PROGRESS NOTES
Alert and oriented x4. Calm and cooperative. Med-compliant. Rated face pain between 7/8 out of 10, received PRN pain medication. Left face is still swollen. Blood glucose taken as per order. On RN-managed potassium replacement protocol. Left PIV is patent and saline-locked.
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
## 2024-09-24 NOTE — PHARMACY-VANCOMYCIN DOSING SERVICE
Pharmacy Vancomycin Initial Note  Date of Service 2024  Patient's  2000  24 year old, female    Indication: Skin and Soft Tissue Infection    Current estimated CrCl = Estimated Creatinine Clearance: 124.9 mL/min (based on SCr of 0.58 mg/dL).    Creatinine for last 3 days  2024:  8:20 AM Creatinine 0.42 mg/dL  2024:  7:51 AM Creatinine 0.59 mg/dL  2024:  8:45 AM Creatinine 0.58 mg/dL    Recent Vancomycin Level(s) for last 3 days  No results found for requested labs within last 3 days.      Vancomycin IV Administrations (past 72 hours)        No vancomycin orders with administrations in past 72 hours.                    Nephrotoxins and other renal medications (From now, onward)      Start     Dose/Rate Route Frequency Ordered Stop    24 1200  ampicillin-sulbactam (UNASYN) 3 g vial to attach to  mL bag        Note to Pharmacy: DO NOT USE THIS FIELD FOR ADMIN INSTRUCTIONS; INFORMATION DOES NOT SHOW ON MAR. USE THE FIELD ABOVE MARKED ADMIN INSTRUCTIONS    3 g  200 mL/hr over 30 Minutes Intravenous EVERY 6 HOURS 24 0916              Contrast Orders - past 72 hours (72h ago, onward)      None            [employer] Prediction of Planned Initial Vancomycin Regimen  Regimen: 750 mg IV every 8 hours.  Start time: 14:55 on 2024  Exposure target: AUC24 (range)400-600 mg/L.hr   AUC24,ss: 481 mg/L.hr  Probability of AUC24 > 400: 69 %  Ctrough,ss: 14.6 mg/L  Probability of Ctrough,ss > 20: 26 %  Probability of nephrotoxicity (Lodise ALEC ): 10 %          Plan:  Start vancomycin  750 mg IV q8h.   Vancomycin monitoring method: AUC  Vancomycin therapeutic monitoring goal: 400-600 mg*h/L  Pharmacy will check vancomycin levels as appropriate in 1-3 Days.    Serum creatinine levels will be ordered daily for the first week of therapy and at least twice weekly for subsequent weeks.      ROSCOE EASLEY, [facility]

## 2024-09-24 NOTE — PROGRESS NOTES
ORAL & MAXILLOFACIALSURGERY   INPATIENT PROGRESS NOTE    Dunia Corley   : 2000   Sex: female   MRN: [id number]                  2024 7:18 AM    ASSESSMENT:  Dunia Corley is a 24 year old y.o. female POD2 s/p ORIF of left mandible, I&D of left facial abscess and extraction of teeth #16,17 in OR on 24. Patient is following expected post op course.    PLAN:  - No additional surgical interventions planned from OMFS perspective at this time  - OMFS will continue to follow while inpatient  - Continue IV abx while inpatient, unasyn 3g Q6h usually provides adequate coverage for oral polymicrobial infections   - Cultures pending  - HOB>30  - Warm packs to face, NO ICE  - Peridex rinses BID  - Resume normal oral hygiene  - Ok for soft diet from OMFS perspective  - Pain management per primary; appreciate cares  - Patient is stable from OMFS standpoint for discharge at this time. She will follow up in our clinic one week from discharge. Address listed below. Our  will call patient and schedule accordingly    [facility] - [facility]  [address] [place] [address]  Clinic phone number: [phone]  Clinic fax number: [phone]      Plan of care d/w RN at the time of encounter, present at bedside.  Pt evaluated with OMS Chief Resident, Dr. Wilson, who will discuss the plan with Dr. Miner, attending.    Benson Novak DDS  Oral and Maxillofacial Surgery Resident PGY-2  __________________________________________________________  SUBJECTIVE:  Patient found resting comfortably in bed. Patient reports doing well with a little more tenderness and pain today but controllable. Tolerating diet very well. Denies nausea or vomiting, chest pain or shortness of breath, fever or chills. Is ambulating w/o problems. Is voiding well. Patient feels she is comfortable with discharge plan when she is able  to leave. Denies any other constitutional symptoms at this time.  .  OBJECTIVE:  VITALS:  Patient Vitals for the past 24 hrs:   BP Temp Temp src Pulse Resp SpO2   09/23/24 1500 118/79 98.6  F (37  C) Axillary 74 18 100 %         Exam:  Neuro: alert, oriented, NAD, cranial nerves intact  HEENT: head normocephalic, atraumatic, edema consistent with postoperative findings on left side, TTP along inferior border of left mandible. Tissue is slightly softer from yesterday, still indurated at inferior border of L mandible.  Oral: edema as expected, hemostatic intact intraoral incisions, Sutures C/I, Hardware intact, SIMEON ~25mm. FOM NT/ND. Extraction sites are hemostatic. Sutures intraorally intact with no concerns for food entrapment or significant swelling present.  Skin: no rashes or lesions. Dressing/Sutures c/d/i.   CV: no JVD appreciated, RRR   Pulm: b/l = chest rise, breathing easily   Abd: non distended, non tender, soft   Ext: warm, well-perfused    Meds:  Current Facility-Administered Medications   Medication Dose Route Frequency Provider Last Rate Last Admin    acetaminophen (TYLENOL) tablet 650 mg  650 mg Oral Q4H PRN Madai Briseno PA-C   650 mg at 09/23/24 0340    ampicillin-sulbactam (UNASYN) 3 g vial to attach to  mL bag  3 g Intravenous Q6H Maribell Johnson  mL/hr at 09/24/24 0554 3 g at 09/24/24 0554    carboxymethylcellulose PF (REFRESH PLUS) 0.5 % ophthalmic solution 1 drop  1 drop Right Eye Q1H PRN Russell Herrera MD   1 drop at 09/22/24 2126    chlorhexidine (PERIDEX) 0.12 % solution 15 mL  15 mL Swish & Spit BID Russell Herrera MD   15 mL at 09/23/24 1942    HYDROmorphone (DILAUDID) injection 0.2 mg  0.2 mg Intravenous Q3H PRN Madai Briseno PA-C   0.2 mg at 09/24/24 0558    lidocaine (LMX4) cream   Topical Q1H PRN Madai Briseno PA-C        lidocaine 1 % 0.1-1 mL  0.1-1 mL Other Q1H PRN Madai Briseno, ARINA        mirtazapine (REMERON SOL-TAB) ODT tab 15 mg  15 mg Orally  disintegrating tablet At Bedtime Russell Herrera MD   15 mg at 09/23/24 2309    oxyCODONE (ROXICODONE) tablet 5 mg  5 mg Oral Q4H PRN Madai Briseno PA-C   5 mg at 09/23/24 2309    sodium chloride (PF) 0.9% PF flush 3 mL  3 mL Intracatheter Q8H Madai Briseno PA-C   3 mL at 09/23/24 1901    sodium chloride (PF) 0.9% PF flush 3 mL  3 mL Intracatheter q1 min prn Madai Briseno PA-C          Labs:    No results found for this or any previous visit (from the past 24 hour(s)).  Recent Labs   Lab 09/23/24  0751 09/22/24  1558 09/22/24  1052 09/22/24  0946 09/22/24  0820 09/21/24  2158 09/21/24  1205 09/21/24  0925 09/21/24  0118   WBC 17.8*  --   --  10.8 12.7*  --   --   --  14.4*   HGB 10.2* 10.3* 9.7* 5.5* 5.9*  --   --   --  9.7*   MCV 87  --   --  84 85  --   --   --  84   *  --   --  600* 617*  --   --   --  395   INR  --   --   --   --   --   --  1.10  --   --      --  139  --  137  --   --   --  136   POTASSIUM 3.6  --  4.5  --  4.1   < >  --  3.0* 2.7*   CHLORIDE 103  --   --   --  102  --   --   --  100   CO2 28  --   --   --  25  --   --   --  24   BUN 10.7  --   --   --  7.9  --   --   --  11.4   CR 0.59  --   --   --  0.42*  --   --   --  0.61   ANIONGAP 10  --   --   --  10  --   --   --  12   EUGENE 8.7*  --   --   --  8.7*  --   --   --  8.5*   *  --  160*  --  149*  --   --   --  91   ALBUMIN  --   --   --   --  3.6  --   --  3.3*  --    PROTTOTAL  --   --   --   --  6.7  --   --  6.3*  --    BILITOTAL  --   --   --   --  0.2  --   --  0.3  --    ALKPHOS  --   --   --   --  83  --   --  91  --    ALT  --   --   --   --  7  --   --  8  --    AST  --   --   --   --  10  --   --  12  --     < > = values in this interval not displayed.      All cultures:  Recent Labs   Lab 09/22/24  0950 09/21/24  9582   CULTURE Culture in progress  2+ Normal stewart  1+ Gram negative bacilli*  No anaerobic organisms isolated after 1 day  See corresponding culture for results No growth after 2  days         Imaging:    Results for orders placed or performed during the hospital encounter of 09/20/24   Soft tissue neck CT w contrast    Narrative    EXAM: CT SOFT TISSUE NECK W CONTRAST  LOCATION: [facility]  DATE: 9/21/2024    INDICATION: 4 days of left sided facial swelling going down towards her left jaw and neck, likely from dental abscess.  COMPARISON: None.  CONTRAST: ISOVUE 370 80ML  TECHNIQUE: Routine CT Soft Tissue Neck with IV contrast. Multiplanar reformats. Dose reduction techniques were used.    FINDINGS:     MUCOSAL SPACES/SOFT TISSUES: Nondisplaced fracture left mandibular angle involves the to the socket for the third mandibular molar. Multiloculated abscess in the asymmetrically enlarged left masseter muscle and extending posterior to the mandibular angle   measures 4.6 cm AP by 2.5 cm transverse by 3.5 cm craniocaudal. There is probably also an early ill-defined abscess involving the asymmetrically enlarged left medial pterygoid muscle. There is significant surrounding soft tissue swelling and   subcutaneous edema in the left buccal region which extends into the left submandibular space. There is also edema extending into the medially along the anterior margin of the sternocleidomastoid muscle into the parapharyngeal space. Normal mucosal spaces   of the upper aerodigestive tract. No mucosal mass or inflammation identified. Normal vocal cords and infraglottic trachea. Normal oral cavity,  spaces, and floor of mouth structures. Dental caries involving the left third maxillary molar.    LYMPH NODES: Prominent reactive left submandibular lymph nodes measure up to 0.9 cm in short axis.     SALIVARY GLANDS: Normal parotid and submandibular glands.    THYROID: Normal.     VESSELS: Vascular structures of the neck are patent.    VISUALIZED INTRACRANIAL/ORBITS/SINUSES: No abnormality of the visualized intracranial compartment. Small old left medial orbital wall  blowout fracture. Otherwise orbits unremarkable. Visualized paranasal sinuses and mastoid air cells are clear.    OTHER: No destructive osseous lesion. The included lung apices are clear.      Impression    IMPRESSION:   1.  Large multiloculated abscess involving the left masseter muscle and extending posterior to the left mandibular angle with probable early abscess involving the left medial pterygoid muscle. Surrounding cellulitis and reactive edema as described above.  2.  Abscess is likely due to spread of oral bacteria through the nondisplaced left mandibular angle fracture which involves the tooth socket for the third mandibular molar.    3.  The above findings were discussed directly with Dr. Rae at 2:40 AM CST on 09/21/2024.

## 2024-09-24 NOTE — CONSULTS
"  [facility]     Patient:  Dunia Corley   Date of birth 2000, Medical record number [id number]  Date of Visit:  09/24/2024  Date of Admission: 9/21/2024  Consult Requester:Russell Herrera MD          Assessment and Recommendations:   ASSESSMENT:  Large multiloculated abscess of left masseter, abscess of left medial pterygoid, cellulitis  - s/p I&D 9/22 with purulence from fracture site, cx with Eikenella corrodens and Staph aureus  Nondisplaced left mandibular angle fracture   s/p I&D, extraction of teeth #16 aand 17, and ORIF of left mandible angle fracture 9/22/24  Hardware: plate and screws- left mandible    DISCUSSION:   Dunia \"Kina\"Barney is a 24 year old female with history of depression who was admitted on 9/21/24 with large multiloculated abscess of left masseter and left mandibular angle fracture. Now s/p I&D, extraction of teeth #16 and #17, and ORIF of mandibular fracture on 9/22/24. Operative note describes purulent material expressed from fracture site, loculations were broken up and cultures collected. Plate and screws were implanted.     Preliminary cultures with organisms c/w oral stewart including Eikenella and S.aureus (susceptibilities pending). Has been on Unasyn since arrival on 9/21. WBC elevated to 17.8K on 9/23, c/w expected following surgery, now 7.8K on 9/24. CRP elevated at 95.67. Borderline temperatures and increased pain and swelling on 9/24. While this may be explained by post-operative swelling, there is concern for possibility of ongoing abscess as S.aureus has been isolated from site with hardware placed on 9/22. Recommend aggressive management of infection with addition of vancomycin while susceptibilities are pending. Would repeat imaging on 9/25 to ensure no residual abscess that would require further I&D for source control. Will need to continue with initial IV therapy while awaiting culture data and imaging. " "      RECOMMENDATION:  Continue Unasyn 3g IV q6hrs  Start vancomycin while awaiting S.aureus susceptibilities -will be able to stop if MSSA  Final treatment regimen TBD based on cultures and imaging findings  Repeat imaging in AM to assess for residual abscess- OK to for ultrasound, low threshold for CT if worsening or if unclear US findings  If any signs of residual abscess, strongly encourage repeat I&D for aggressive surgical source control as S.aureus is involved  Repeat CRP in AM    Thank you for this consult. ID will continue to follow.     Patient was discussed with Dr. Mccain.     Colleen Rowe PA-C  Pronouns: she/her/hers  Infectious Diseases  Contact via [employer] or [employer] Paging/Directory      60 MINUTES SPENT BY ME on the date of service doing chart review, history, exam, documentation & further activities per the note.       ________________________________________________________________    Consult Question: pt with Left masseter muscle abscess sp surg, please recommned onging abx regime  Admission Diagnosis: Hypokalemia [E87.6]  Oral abscess [K12.2]  Open fracture of left side of mandible, unspecified mandibular site, initial encounter [S02.609B]         History of Present Illness:   Dunia \"Kina\" Barney is a 24 year old female with history of depression who was admitted on 9/21/24 with large multiloculated abscess of left masseter and left mandibular angle fracture. Now s/p I&D, extraction of teeth #16 and #17, and ORIF of mandibular fracture on 9/22/24.    Patient reports 3 days of increasing facial pain and swelling prior to admission. She was using OTC pain relievers and hot showers at home to manage pain. No fevers, chills, rigors, nausea, vomiting at home. Did have some sweats. Pain improved after surgery on 9/22. She reports that she has had increased tightness and pain in her face today. Has not noted any drainage in her mouth. Has been using heat packs as directed by OMFS. No fevers or " rigors - does report a temperature to 100.6 this AM that was rechecked a short time later at 99.9 (99.9 was documented). Feels more fatigued than usual.          Past Medical History:   No past medical history on file.         Past Surgical History:     Past Surgical History:   Procedure Laterality Date    INCISION AND DRAINAGE NECK, COMBINED Left 9/22/2024    Procedure: Incision and drainage neck, combined;  Surgeon: Juvencio Miner DDS;  Location: [facility] OR    OPEN REDUCTION INTERNAL FIXATION MANDIBLE Left 9/22/2024    Procedure: Open reduction internal fixation Left Mandable Angle Fracture, Application of Maxillodibullar Fixation, Irrigation and debridement of facial abscess via intra and extra oral approaches, Extraction of teeth #16 and #17;  Surgeon: Juvencio Miner DDS;  Location: [facility] OR            Family History:   Reviewed and non-contributory.   Family History   Problem Relation Age of Onset    Substance Abuse Mother     Substance Abuse Father     Substance Abuse Maternal Aunt     Substance Abuse Paternal Uncle             Social History:     Social History     Tobacco Use    Smoking status: Former     Types: Vaping Device    Smokeless tobacco: Not on file   Substance Use Topics    Alcohol use: Yes     Comment: Infrequently     History   Sexual Activity    Sexual activity: Yes    Partners: Male            Current Medications:     Current Facility-Administered Medications   Medication Dose Route Frequency Provider Last Rate Last Admin    ampicillin-sulbactam (UNASYN) 3 g vial to attach to  mL bag  3 g Intravenous Q6H Maribell Johnson  mL/hr at 09/24/24 0554 3 g at 09/24/24 0554    chlorhexidine (PERIDEX) 0.12 % solution 15 mL  15 mL Swish & Spit BID Russell Herrera MD   15 mL at 09/24/24 0904    mirtazapine (REMERON SOL-TAB) ODT tab 15 mg  15 mg Orally disintegrating tablet At Bedtime Russell Herrera MD   15 mg at 09/23/24 2309    sodium chloride (PF) 0.9% PF flush 3 mL  3 mL Intracatheter Q8H Finn  Madai ADAMS PA-C   3 mL at 09/23/24 1901            Allergies:   No Known Allergies         Physical Exam:   Vitals were reviewed  Patient Vitals for the past 24 hrs:   BP Temp Temp src Pulse Resp SpO2   09/24/24 0857 125/80 99.9  F (37.7  C) Oral 77 18 100 %   09/23/24 1500 118/79 98.6  F (37  C) Axillary 74 18 100 %       Physical Examination:  GENERAL:  awake, alert, oriented, pleasant and interactive. Sitting up in bed in NAD. Grandmother is at bedside.  HEENT:  Left facial swelling most pronounced on cheek and jaw.   EYES:  Eyes have anicteric sclerae without conjunctival injection or discharge    ENT:  Oropharynx is moist. ROM limited by post-op status, pain, and swelling. Some slough visible at left mandible gumline. No purulence or drainage noted.   NECK:  Supple.   LUNGS:  Clear to auscultation bilateral, no wheeze, crackles, or rhonchi. Normal respiratory effort on RA.   CARDIOVASCULAR:  RRR, +S1/S2, no murmur appreciated.  SKIN:  No acute rashes, jaundice or diaphoresis.  PIV in place without any surrounding erythema or exudate. No stigmata of endocarditis.  NEUROLOGIC:  Grossly nonfocal. Active x4 extremities         Laboratory Data:     Inflammatory Markers    Recent Labs   Lab Test 09/23/24  0751   CRPI 95.67*       Hematology Studies    Recent Labs   Lab Test 09/24/24  0845 09/23/24  0751 09/22/24  1558 09/22/24  1052 09/22/24  0946 09/22/24  0820 09/21/24  0118 07/08/18  0148   WBC 7.8 17.8*  --   --  10.8 12.7* 14.4* 5.2   HGB 11.1* 10.2* 10.3* 9.7* 5.5* 5.9* 9.7* 13.0   MCV 87 87  --   --  84 85 84 85   * 554*  --   --  600* 617* 395 207       Metabolic Studies     Recent Labs   Lab Test 09/24/24  0845 09/23/24  0751 09/22/24  1052 09/22/24  0820 09/21/24  2158 09/21/24  0925 09/21/24  0118 07/09/18  0739    141 139 137  --   --  136 140   POTASSIUM 3.6 3.6 4.5 4.1 4.6   < > 2.7* 3.5   CHLORIDE 102 103  --  102  --   --  100 108   CO2 30* 28  --  25  --   --  24 29   BUN 7.2 10.7  --   "7.9  --   --  11.4 7   CR 0.58 0.59  --  0.42*  --   --  0.61 0.74   GFRESTIMATED >90 >90  --  >90  --   --  >90 >90    < > = values in this interval not displayed.       Hepatic Studies    Recent Labs   Lab Test 09/22/24  0820 09/21/24  0925 07/09/18  0739 07/08/18  0147 07/08/18  0000   BILITOTAL 0.2 0.3 0.5 0.8  --    ALKPHOS 83 91 90 97  --    ALBUMIN 3.6 3.3* 3.8 4.0  --    AST 10 12 16 19 19   ALT 7 8 21 18 18       Microbiology:  Culture   Date Value Ref Range Status   09/22/2024 No anaerobic organisms isolated after 1 day  Preliminary   09/22/2024 Culture in progress  Preliminary   09/22/2024 2+ Normal stewart  Preliminary   09/22/2024 1+ Gram negative bacilli (A)  Preliminary   09/21/2024 No growth after 2 days  Preliminary     GS Culture   Date Value Ref Range Status   09/22/2024 See corresponding culture for results  Final       Urine Studies  No lab results found.    Vancomycin Levels  No lab results found.    Invalid input(s): \"VANCO\"    Hepatitis B Testing No lab results found.  Hepatitis C Testing   No results found for: \"HCVAB\", \"HQTG\", \"HCGENO\", \"HCPCR\", \"HQTRNA\", \"HEPRNA\"  Respiratory Virus Testing    No results found for: \"RS\", \"FLUAG\"          Imaging:   CT soft tissue neck w/ contrast 9/21/24  IMPRESSION:   1.  Large multiloculated abscess involving the left masseter muscle and extending posterior to the left mandibular angle with probable early abscess involving the left medial pterygoid muscle. Surrounding cellulitis and reactive edema as described above.  2.  Abscess is likely due to spread of oral bacteria through the nondisplaced left mandibular angle fracture which involves the tooth socket for the third mandibular molar.    "

## 2024-09-25 LAB
ANION GAP SERPL CALCULATED.3IONS-SCNC: 8 MMOL/L (ref 7–15)
BACTERIA TISS BX CULT: ABNORMAL
BACTERIA TISS BX CULT: ABNORMAL
BASOPHILS # BLD AUTO: 0.1 10E3/UL (ref 0–0.2)
BASOPHILS NFR BLD AUTO: 1 %
BUN SERPL-MCNC: 12.1 MG/DL (ref 6–20)
CALCIUM SERPL-MCNC: 9 MG/DL (ref 8.8–10.4)
CHLORIDE SERPL-SCNC: 100 MMOL/L (ref 98–107)
CREAT SERPL-MCNC: 0.58 MG/DL (ref 0.51–0.95)
CRP SERPL-MCNC: 30.71 MG/L
EGFRCR SERPLBLD CKD-EPI 2021: >90 ML/MIN/1.73M2
EOSINOPHIL # BLD AUTO: 0.1 10E3/UL (ref 0–0.7)
EOSINOPHIL NFR BLD AUTO: 1 %
ERYTHROCYTE [DISTWIDTH] IN BLOOD BY AUTOMATED COUNT: 13.4 % (ref 10–15)
GLUCOSE BLDC GLUCOMTR-MCNC: 116 MG/DL (ref 70–99)
GLUCOSE BLDC GLUCOMTR-MCNC: 98 MG/DL (ref 70–99)
GLUCOSE SERPL-MCNC: 93 MG/DL (ref 70–99)
HCO3 SERPL-SCNC: 29 MMOL/L (ref 22–29)
HCT VFR BLD AUTO: 30.9 % (ref 35–47)
HGB BLD-MCNC: 10.2 G/DL (ref 11.7–15.7)
IMM GRANULOCYTES # BLD: 0.3 10E3/UL
IMM GRANULOCYTES NFR BLD: 4 %
LYMPHOCYTES # BLD AUTO: 2.7 10E3/UL (ref 0.8–5.3)
LYMPHOCYTES NFR BLD AUTO: 41 %
MCH RBC QN AUTO: 28.7 PG (ref 26.5–33)
MCHC RBC AUTO-ENTMCNC: 33 G/DL (ref 31.5–36.5)
MCV RBC AUTO: 87 FL (ref 78–100)
MONOCYTES # BLD AUTO: 0.6 10E3/UL (ref 0–1.3)
MONOCYTES NFR BLD AUTO: 9 %
NEUTROPHILS # BLD AUTO: 2.9 10E3/UL (ref 1.6–8.3)
NEUTROPHILS NFR BLD AUTO: 44 %
NRBC # BLD AUTO: 0 10E3/UL
NRBC BLD AUTO-RTO: 0 /100
PLATELET # BLD AUTO: 502 10E3/UL (ref 150–450)
POTASSIUM SERPL-SCNC: 4 MMOL/L (ref 3.4–5.3)
RBC # BLD AUTO: 3.56 10E6/UL (ref 3.8–5.2)
SODIUM SERPL-SCNC: 137 MMOL/L (ref 135–145)
WBC # BLD AUTO: 6.6 10E3/UL (ref 4–11)

## 2024-09-25 PROCEDURE — 258N000003 HC RX IP 258 OP 636: Performed by: INTERNAL MEDICINE

## 2024-09-25 PROCEDURE — 80048 BASIC METABOLIC PNL TOTAL CA: CPT

## 2024-09-25 PROCEDURE — 86140 C-REACTIVE PROTEIN: CPT | Performed by: INTERNAL MEDICINE

## 2024-09-25 PROCEDURE — 250N000011 HC RX IP 250 OP 636: Performed by: EMERGENCY MEDICINE

## 2024-09-25 PROCEDURE — 250N000013 HC RX MED GY IP 250 OP 250 PS 637

## 2024-09-25 PROCEDURE — 250N000011 HC RX IP 250 OP 636: Performed by: INTERNAL MEDICINE

## 2024-09-25 PROCEDURE — 250N000011 HC RX IP 250 OP 636

## 2024-09-25 PROCEDURE — 85025 COMPLETE CBC W/AUTO DIFF WBC: CPT | Performed by: INTERNAL MEDICINE

## 2024-09-25 PROCEDURE — 99233 SBSQ HOSP IP/OBS HIGH 50: CPT | Performed by: STUDENT IN AN ORGANIZED HEALTH CARE EDUCATION/TRAINING PROGRAM

## 2024-09-25 PROCEDURE — 99232 SBSQ HOSP IP/OBS MODERATE 35: CPT | Performed by: INTERNAL MEDICINE

## 2024-09-25 PROCEDURE — 36415 COLL VENOUS BLD VENIPUNCTURE: CPT | Performed by: INTERNAL MEDICINE

## 2024-09-25 PROCEDURE — 250N000013 HC RX MED GY IP 250 OP 250 PS 637: Performed by: INTERNAL MEDICINE

## 2024-09-25 PROCEDURE — 120N000002 HC R&B MED SURG/OB UMMC

## 2024-09-25 RX ADMIN — CHLORHEXIDINE GLUCONATE, 0.12% ORAL RINSE 15 ML: 1.2 SOLUTION DENTAL at 08:14

## 2024-09-25 RX ADMIN — AMPICILLIN SODIUM AND SULBACTAM SODIUM 3 G: 2; 1 INJECTION, POWDER, FOR SOLUTION INTRAMUSCULAR; INTRAVENOUS at 17:42

## 2024-09-25 RX ADMIN — HYDROMORPHONE HYDROCHLORIDE 0.2 MG: 0.2 INJECTION, SOLUTION INTRAMUSCULAR; INTRAVENOUS; SUBCUTANEOUS at 08:14

## 2024-09-25 RX ADMIN — CHLORHEXIDINE GLUCONATE, 0.12% ORAL RINSE 15 ML: 1.2 SOLUTION DENTAL at 20:11

## 2024-09-25 RX ADMIN — OXYCODONE HYDROCHLORIDE 5 MG: 5 TABLET ORAL at 10:26

## 2024-09-25 RX ADMIN — OXYCODONE HYDROCHLORIDE 5 MG: 5 TABLET ORAL at 20:12

## 2024-09-25 RX ADMIN — HYDROMORPHONE HYDROCHLORIDE 0.2 MG: 0.2 INJECTION, SOLUTION INTRAMUSCULAR; INTRAVENOUS; SUBCUTANEOUS at 01:54

## 2024-09-25 RX ADMIN — HYDROMORPHONE HYDROCHLORIDE 0.2 MG: 0.2 INJECTION, SOLUTION INTRAMUSCULAR; INTRAVENOUS; SUBCUTANEOUS at 13:04

## 2024-09-25 RX ADMIN — MIRTAZAPINE 15 MG: 15 TABLET, ORALLY DISINTEGRATING ORAL at 22:08

## 2024-09-25 RX ADMIN — OXYCODONE HYDROCHLORIDE 5 MG: 5 TABLET ORAL at 14:08

## 2024-09-25 RX ADMIN — AMPICILLIN SODIUM AND SULBACTAM SODIUM 3 G: 2; 1 INJECTION, POWDER, FOR SOLUTION INTRAMUSCULAR; INTRAVENOUS at 06:01

## 2024-09-25 RX ADMIN — AMPICILLIN SODIUM AND SULBACTAM SODIUM 3 G: 2; 1 INJECTION, POWDER, FOR SOLUTION INTRAMUSCULAR; INTRAVENOUS at 11:56

## 2024-09-25 RX ADMIN — VANCOMYCIN HYDROCHLORIDE 750 MG: 1 INJECTION, POWDER, LYOPHILIZED, FOR SOLUTION INTRAVENOUS at 08:17

## 2024-09-25 RX ADMIN — HYDROMORPHONE HYDROCHLORIDE 0.2 MG: 0.2 INJECTION, SOLUTION INTRAMUSCULAR; INTRAVENOUS; SUBCUTANEOUS at 17:42

## 2024-09-25 RX ADMIN — AMPICILLIN SODIUM AND SULBACTAM SODIUM 3 G: 2; 1 INJECTION, POWDER, FOR SOLUTION INTRAMUSCULAR; INTRAVENOUS at 00:55

## 2024-09-25 RX ADMIN — OXYCODONE HYDROCHLORIDE 5 MG: 5 TABLET ORAL at 05:45

## 2024-09-25 RX ADMIN — HYDROMORPHONE HYDROCHLORIDE 0.2 MG: 0.2 INJECTION, SOLUTION INTRAMUSCULAR; INTRAVENOUS; SUBCUTANEOUS at 22:08

## 2024-09-25 ASSESSMENT — ACTIVITIES OF DAILY LIVING (ADL)
ADLS_ACUITY_SCORE: 20

## 2024-09-25 NOTE — PLAN OF CARE
[id number]    Goal Outcome Evaluation:      Plan of Care Reviewed With: patient    Overall Patient Progress: improvingOverall Patient Progress: improving    Patient is A&O x4. Call light within reach. Able to make needs known. Independent in the room. Voids spontaneously to the bathroom. LBM: 09/24.    RA. Mechanical/Dental soft diet. PIV on L lower forearm infusing with scheduled antibiotics, otherwise, SL. L facial pain managed with PO Oxy and IV dilaudid. Denies SOB, chest pain, n/v and n/t. L face and upper lip swollen.     RN managed K. AM lab draws placed.    Continue with POC.

## 2024-09-25 NOTE — PLAN OF CARE
"Goal Outcome Evaluation:  /73 (BP Location: Right arm)   Pulse 59   Temp 99.8  F (37.7  C) (Oral)   Resp 17   Ht 1.6 m (5' 2.99\")   Wt 52.9 kg (116 lb 11.2 oz)   SpO2 100%   BMI 20.68 kg/m    Patient is A&O x4. Able to make needs known. Independent in the room. Voids spontaneously to the bathroom. Continent of both- LBM: 09/24. Facial swelling better today, c/o pain rating 8-10/10-utilizing PRN pain meds-somehow effective per patient. No acute event, call light within reach, plan of care continues     RA. Mechanical/Dental soft diet. PIV on L lower forearm infusing with scheduled antibiotics, otherwise, SL. L facial pain managed with PO Oxy and IV dilaudid. Denies SOB, chest pain, n/v and n/t. L face and upper lip swollen.      RN managed K. AM lab draws placed.     Problem: Adult Inpatient Plan of Care  Goal: Plan of Care Review  Description: The Plan of Care Review/Shift note should be completed every shift.  The Outcome Evaluation is a brief statement about your assessment that the patient is improving, declining, or no change.  This information will be displayed automatically on your shift  note.  Outcome: Progressing  Flowsheets (Taken 9/25/2024 1541)  Plan of Care Reviewed With: patient  Overall Patient Progress: improving  Goal: Patient-Specific Goal (Individualized)  Description: You can add care plan individualizations to a care plan. Examples of Individualization might be:  \"Parent requests to be called daily at 9am for status\", \"I have a hard time hearing out of my right ear\", or \"Do not touch me to wake me up as it startles  me\".  Outcome: Progressing  Goal: Absence of Hospital-Acquired Illness or Injury  Outcome: Progressing  Intervention: Identify and Manage Fall Risk  Recent Flowsheet Documentation  Taken 9/25/2024 1201 by Talya Argueta RN  Safety Promotion/Fall Prevention:   clutter free environment maintained   lighting adjusted   nonskid shoes/slippers when out of bed   room " near nurse's station   safety round/check completed  Intervention: Prevent Skin Injury  Recent Flowsheet Documentation  Taken 9/25/2024 1201 by Talya Argueta RN  Body Position: position changed independently  Intervention: Prevent Infection  Recent Flowsheet Documentation  Taken 9/25/2024 1201 by Talya Argueta RN  Infection Prevention:   environmental surveillance performed   rest/sleep promoted   single patient room provided  Goal: Optimal Comfort and Wellbeing  Outcome: Progressing  Goal: Readiness for Transition of Care  Outcome: Progressing     Problem: Pain Acute  Goal: Optimal Pain Control and Function  Outcome: Progressing  Intervention: Prevent or Manage Pain  Recent Flowsheet Documentation  Taken 9/25/2024 1201 by Talya Argueta RN  Sensory Stimulation Regulation:   care clustered   quiet environment promoted   lighting decreased  Medication Review/Management: medications reviewed  Intervention: Optimize Psychosocial Wellbeing  Recent Flowsheet Documentation  Taken 9/25/2024 1201 by Talya Argueta RN  Supportive Measures: active listening utilized         Plan of Care Reviewed With: patient    Overall Patient Progress: improvingOverall Patient Progress: improving
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
## 2024-09-25 NOTE — PROGRESS NOTES
ORAL & MAXILLOFACIALSURGERY   INPATIENT PROGRESS NOTE    Dunia Corley   : 2000   Sex: female   MRN: [id number]                  2024 7:18 AM    ASSESSMENT:  Dunia Corley is a 24 year old y.o. female POD2 s/p ORIF of left mandible, I&D of left facial abscess and extraction of teeth #16,17 in OR on 24. Patient is following expected post op course.    PLAN:  - No additional surgical interventions planned from OMFS perspective at this time  - OMFS will continue to follow while inpatient  - Continue IV abx while inpatient, unasyn 3g Q6h usually provides adequate coverage for oral polymicrobial infections, ID recs for IV Vancomycin 750 mg q8h to broaden coverage for staph aureus  - Cultures results: staph aureus and eikenella corrodens  - HOB>30  - Warm packs to face, NO ICE  - Peridex rinses BID  - Resume normal oral hygiene  - Ok for soft diet from OMFS perspective  - Pain management per primary; appreciate cares  - Patient is stable from OMFS standpoint for discharge at this time. She will follow up in our clinic one week from discharge. Address listed below. Our  will call patient and schedule accordingly    [facility] - [facility]  [address]th [place] [address]  Clinic phone number: [phone]  Clinic fax number: [phone]      Plan of care d/w RN at the time of encounter, present at bedside.  Pt evaluated with OMS Chief Resident, Dr. Wilson, who will discuss the plan with Dr. Miner, attending.    Brook Mckeon DDS  Oral and Maxillofacial Surgery Intern  __________________________________________________________  SUBJECTIVE:  Patient found resting comfortably in bed. Patient reports doing well with a little more tenderness and she woke up in the middle of the night with 9/10 pain, however this resolved after she was given pain meds. Tolerating diet very well. Denies nausea or  vomiting, chest pain or shortness of breath, fever or chills. Is ambulating w/o problems. She reports that her family is coming to visit today and she will sit in the family room with them. Is voiding well. Patient feels she is comfortable with discharge plan when she is able to leave. She reports that the medicine team has talked about keeping her to evaluate swelling and culture results. Denies any other constitutional symptoms at this time.  .  OBJECTIVE:  VITALS:  Patient Vitals for the past 24 hrs:   BP Temp Temp src Pulse Resp SpO2   09/25/24 0730 118/73 99.8  F (37.7  C) Oral 59 17 100 %   09/24/24 2129 123/80 98.6  F (37  C) Oral 68 17 100 %   09/24/24 1557 119/69 99.4  F (37.4  C) Oral 71 18 99 %   09/24/24 0857 125/80 99.9  F (37.7  C) Oral 77 18 100 %         Exam:  Neuro: alert, oriented, NAD, cranial nerves intact  HEENT: head normocephalic, atraumatic, edema consistent with postoperative findings on left side, TTP along inferior border of left mandible. Tissue is soft, still indurated at inferior border of L mandible.  Oral: edema as expected, hemostatic intact intraoral incisions, Sutures C/I, Hardware intact, SIMEON ~25mm. FOM NT/ND. Extraction sites are hemostatic. Sutures intraorally intact with no concerns for food entrapment or significant swelling present.  Skin: no rashes or lesions. Dressing/Sutures c/d/i.   CV: no JVD appreciated, RRR   Pulm: b/l = chest rise, breathing easily   Abd: non distended, non tender, soft   Ext: warm, well-perfused    Meds:  Current Facility-Administered Medications   Medication Dose Route Frequency Provider Last Rate Last Admin    acetaminophen (TYLENOL) tablet 650 mg  650 mg Oral Q4H PRN Madai Briseno PA-C   650 mg at 09/23/24 0340    ampicillin-sulbactam (UNASYN) 3 g vial to attach to  mL bag  3 g Intravenous Q6H Maribell Johnson  mL/hr at 09/25/24 0601 3 g at 09/25/24 0601    carboxymethylcellulose PF (REFRESH PLUS) 0.5 % ophthalmic solution 1 drop  1  drop Right Eye Q1H PRN Russell Herrera MD   1 drop at 09/22/24 2126    chlorhexidine (PERIDEX) 0.12 % solution 15 mL  15 mL Swish & Spit BID Russell Herrera MD   15 mL at 09/24/24 1959    HYDROmorphone (DILAUDID) injection 0.2 mg  0.2 mg Intravenous Q3H PRN Madai Briseno PA-C   0.2 mg at 09/25/24 0154    lidocaine (LMX4) cream   Topical Q1H PRN Madai Briseno PA-C        lidocaine 1 % 0.1-1 mL  0.1-1 mL Other Q1H PRN Madai Briseno PA-C        mirtazapine (REMERON SOL-TAB) ODT tab 15 mg  15 mg Orally disintegrating tablet At Bedtime Russell Herrera MD   15 mg at 09/24/24 2131    naloxone (NARCAN) injection 0.2 mg  0.2 mg Intravenous Q2 Min PRN Russell Herrera MD        Or    naloxone (NARCAN) injection 0.4 mg  0.4 mg Intravenous Q2 Min PRN Russell Herrera MD        Or    naloxone (NARCAN) injection 0.2 mg  0.2 mg Intramuscular Q2 Min PRN Russell Herrera MD        Or    naloxone (NARCAN) injection 0.4 mg  0.4 mg Intramuscular Q2 Min PRRussell Henry MD        oxyCODONE (ROXICODONE) tablet 5 mg  5 mg Oral Q4H PRN Madai Briseno PA-C   5 mg at 09/25/24 0545    sodium chloride (PF) 0.9% PF flush 3 mL  3 mL Intracatheter Q8H Madai Briseno PA-C   3 mL at 09/25/24 0154    sodium chloride (PF) 0.9% PF flush 3 mL  3 mL Intracatheter q1 min prn Madai Briseno PA-C   3 mL at 09/24/24 1547    vancomycin (VANCOCIN) 750 mg in sodium chloride 0.9 % 250 mL intermittent infusion  750 mg Intravenous Q8H Russell Herrera MD   750 mg at 09/24/24 2336      Labs:    Results for orders placed or performed during the hospital encounter of 09/21/24 (from the past 24 hour(s))   Basic metabolic panel   Result Value Ref Range    Sodium 141 135 - 145 mmol/L    Potassium 3.6 3.4 - 5.3 mmol/L    Chloride 102 98 - 107 mmol/L    Carbon Dioxide (CO2) 30 (H) 22 - 29 mmol/L    Anion Gap 9 7 - 15 mmol/L    Urea Nitrogen 7.2 6.0 - 20.0 mg/dL    Creatinine 0.58 0.51 - 0.95 mg/dL    GFR Estimate >90 >60 mL/min/1.73m2    Calcium 8.9 8.8 - 10.4  mg/dL    Glucose 93 70 - 99 mg/dL   CBC with platelets   Result Value Ref Range    WBC Count 7.8 4.0 - 11.0 10e3/uL    RBC Count 3.96 3.80 - 5.20 10e6/uL    Hemoglobin 11.1 (L) 11.7 - 15.7 g/dL    Hematocrit 34.4 (L) 35.0 - 47.0 %    MCV 87 78 - 100 fL    MCH 28.0 26.5 - 33.0 pg    MCHC 32.3 31.5 - 36.5 g/dL    RDW 13.9 10.0 - 15.0 %    Platelet Count 538 (H) 150 - 450 10e3/uL   Hemoglobin A1c   Result Value Ref Range    Estimated Average Glucose 111 <117 mg/dL    Hemoglobin A1C 5.5 <5.7 %   Glucose by meter   Result Value Ref Range    GLUCOSE BY METER POCT 107 (H) 70 - 99 mg/dL   CBC with Platelets & Differential    Narrative    The following orders were created for panel order CBC with Platelets & Differential.  Procedure                               Abnormality         Status                     ---------                               -----------         ------                     CBC with platelets and d...[[id number]]  Abnormal            Final result                 Please view results for these tests on the individual orders.   CBC with platelets and differential   Result Value Ref Range    WBC Count 6.6 4.0 - 11.0 10e3/uL    RBC Count 3.56 (L) 3.80 - 5.20 10e6/uL    Hemoglobin 10.2 (L) 11.7 - 15.7 g/dL    Hematocrit 30.9 (L) 35.0 - 47.0 %    MCV 87 78 - 100 fL    MCH 28.7 26.5 - 33.0 pg    MCHC 33.0 31.5 - 36.5 g/dL    RDW 13.4 10.0 - 15.0 %    Platelet Count 502 (H) 150 - 450 10e3/uL    % Neutrophils 44 %    % Lymphocytes 41 %    % Monocytes 9 %    % Eosinophils 1 %    % Basophils 1 %    % Immature Granulocytes 4 %    NRBCs per 100 WBC 0 <1 /100    Absolute Neutrophils 2.9 1.6 - 8.3 10e3/uL    Absolute Lymphocytes 2.7 0.8 - 5.3 10e3/uL    Absolute Monocytes 0.6 0.0 - 1.3 10e3/uL    Absolute Eosinophils 0.1 0.0 - 0.7 10e3/uL    Absolute Basophils 0.1 0.0 - 0.2 10e3/uL    Absolute Immature Granulocytes 0.3 <=0.4 10e3/uL    Absolute NRBCs 0.0 10e3/uL   Glucose by meter   Result Value Ref Range    GLUCOSE BY  METER POCT 98 70 - 99 mg/dL     Recent Labs   Lab 09/25/24  0738 09/25/24  0648 09/24/24  1742 09/24/24  0845 09/23/24  0751 09/22/24  1558 09/22/24  1052 09/22/24  0946 09/22/24  0820 09/21/24  2158 09/21/24  1205 09/21/24  0925   WBC  --  6.6  --  7.8 17.8*  --   --    < > 12.7*  --   --   --    HGB  --  10.2*  --  11.1* 10.2*   < > 9.7*   < > 5.9*  --   --   --    MCV  --  87  --  87 87  --   --    < > 85  --   --   --    PLT  --  502*  --  538* 554*  --   --    < > 617*  --   --   --    INR  --   --   --   --   --   --   --   --   --   --  1.10  --    NA  --   --   --  141 141  --  139  --  137  --   --   --    POTASSIUM  --   --   --  3.6 3.6  --  4.5  --  4.1   < >  --  3.0*   CHLORIDE  --   --   --  102 103  --   --   --  102  --   --   --    CO2  --   --   --  30* 28  --   --   --  25  --   --   --    BUN  --   --   --  7.2 10.7  --   --   --  7.9  --   --   --    CR  --   --   --  0.58 0.59  --   --   --  0.42*  --   --   --    ANIONGAP  --   --   --  9 10  --   --   --  10  --   --   --    EUGENE  --   --   --  8.9 8.7*  --   --   --  8.7*  --   --   --    GLC 98  --  107* 93 111*  --  160*  --  149*  --   --   --    ALBUMIN  --   --   --   --   --   --   --   --  3.6  --   --  3.3*   PROTTOTAL  --   --   --   --   --   --   --   --  6.7  --   --  6.3*   BILITOTAL  --   --   --   --   --   --   --   --  0.2  --   --  0.3   ALKPHOS  --   --   --   --   --   --   --   --  83  --   --  91   ALT  --   --   --   --   --   --   --   --  7  --   --  8   AST  --   --   --   --   --   --   --   --  10  --   --  12    < > = values in this interval not displayed.      All cultures:  Recent Labs   Lab 09/22/24  0950 09/21/24  1209   CULTURE Culture in progress  2+ Normal stewart  1+ Eikenella corrodens*  1+ Staphylococcus aureus*  No anaerobic organisms isolated after 1 day  See corresponding culture for results No growth after 3 days         Imaging:    Results for orders placed or performed during the hospital  encounter of 09/20/24   Soft tissue neck CT w contrast    Narrative    EXAM: CT SOFT TISSUE NECK W CONTRAST  LOCATION: [facility]  DATE: 9/21/2024    INDICATION: 4 days of left sided facial swelling going down towards her left jaw and neck, likely from dental abscess.  COMPARISON: None.  CONTRAST: ISOVUE 370 80ML  TECHNIQUE: Routine CT Soft Tissue Neck with IV contrast. Multiplanar reformats. Dose reduction techniques were used.    FINDINGS:     MUCOSAL SPACES/SOFT TISSUES: Nondisplaced fracture left mandibular angle involves the to the socket for the third mandibular molar. Multiloculated abscess in the asymmetrically enlarged left masseter muscle and extending posterior to the mandibular angle   measures 4.6 cm AP by 2.5 cm transverse by 3.5 cm craniocaudal. There is probably also an early ill-defined abscess involving the asymmetrically enlarged left medial pterygoid muscle. There is significant surrounding soft tissue swelling and   subcutaneous edema in the left buccal region which extends into the left submandibular space. There is also edema extending into the medially along the anterior margin of the sternocleidomastoid muscle into the parapharyngeal space. Normal mucosal spaces   of the upper aerodigestive tract. No mucosal mass or inflammation identified. Normal vocal cords and infraglottic trachea. Normal oral cavity,  spaces, and floor of mouth structures. Dental caries involving the left third maxillary molar.    LYMPH NODES: Prominent reactive left submandibular lymph nodes measure up to 0.9 cm in short axis.     SALIVARY GLANDS: Normal parotid and submandibular glands.    THYROID: Normal.     VESSELS: Vascular structures of the neck are patent.    VISUALIZED INTRACRANIAL/ORBITS/SINUSES: No abnormality of the visualized intracranial compartment. Small old left medial orbital wall blowout fracture. Otherwise orbits unremarkable. Visualized paranasal sinuses and mastoid  air cells are clear.    OTHER: No destructive osseous lesion. The included lung apices are clear.      Impression    IMPRESSION:   1.  Large multiloculated abscess involving the left masseter muscle and extending posterior to the left mandibular angle with probable early abscess involving the left medial pterygoid muscle. Surrounding cellulitis and reactive edema as described above.  2.  Abscess is likely due to spread of oral bacteria through the nondisplaced left mandibular angle fracture which involves the tooth socket for the third mandibular molar.    3.  The above findings were discussed directly with Dr. Rae at 2:40 AM CST on 09/21/2024.

## 2024-09-25 NOTE — PROGRESS NOTES
General ID Service: Follow Up Note      Patient:  Dunia Corley, Date of birth 2000, Medical record number [id number]  Date of Visit:  September 25, 2024         Assessment and Recommendations:   Problem List:  1. Large multiloculated abscess of left masseter, abscess of left medial pterygoid, cellulitis  - S/p I&D 9/22 with purulence from fracture site  - Intra-op cultures With Fusobacterium, Eikenella, and MSSA  Nondisplaced left mandibular angle fracture   s/p I&D, extraction of teeth #16 aand 17, and ORIF of left mandible angle fracture 9/22/24  Hardware: plate and screws- left mandible    Recommendations:  Stop vancomycin (done for your)  Continue ampicillin-sulbactam  On Saturday, would be okay to transition to Augmentin 875/125mg PO BID.   Would plan for 6 week course given unclear duration of purulence in fracture, possible bony involvement in infection, and presence of hardware.  Abx end date of 11/3/24  Have messaged our clinic to get appt arranged near end of therapy.    Discussion:  23yo F admitted with multiloculated left masseter abscess and left mandibular fracture, now s/p operative I&D, ORIF. Cultures from OR growing MSSA and Eikenella.     Doing well today - gradual improvement in swelling of left mandible/cheek and improvement in pain. Discussed longer abx plan, which pt was agreeable to.     Peter Mccain MD  Division of Infectious Diseases and International Medicine  P: [phone]        Interval History:     Seen and examined - swelling decreased, pain improving. No complaints or questions. Discussed plan with pt's grandmother (at bedside) and mother (on speaker phone).         Review of Systems:     Full 9 pt ROS obtained and negative unless noted above in assessment and interval history.         Current Antimicrobials     Vancomycin  Amp-sulbactam         Physical Exam:   Ranges for vital signs:  Temp:  [98.6  F (37  C)-99.8  F (37.7  C)] 99.8  F (37.7  C)  Pulse:  [59-71]  59  Resp:  [17-18] 17  BP: (118-123)/(69-80) 118/73  SpO2:  [99 %-100 %] 100 %  No intake or output data in the 24 hours ending 09/25/24 1524    Exam:  GENERAL:  awake, alert, oriented, pleasant and interactive. Resting comfortable in bed. Grandmother at bedside.  HEENT:  Left facial swelling decreasing, no dolly erythema.   EYES:  Eyes have anicteric sclerae without conjunctival injection or discharge    LUNGS:  Breathing easily on room air.  SKIN:  No acute rashes, jaundice or diaphoresis.   NEUROLOGIC:  Grossly nonfocal. Active x4 extremities         Laboratory Data:   Reviewed.  Pertinent for:    Microbiology:  Culture   Date Value Ref Range Status   09/22/2024 1+ Fusobacterium nucleatum (A)  Final     Comment:     Susceptibilities not routinely done, refer to antibiogram to view typical susceptibility profiles   09/22/2024 2+ Mixed Aerobic and Anaerobic stewart  Final   09/22/2024 2+ Normal stewart  Preliminary   09/22/2024 1+ Eikenella corrodens (A)  Preliminary   09/22/2024 1+ Staphylococcus aureus (A)  Preliminary   09/21/2024 No growth after 4 days  Preliminary     GS Culture   Date Value Ref Range Status   09/22/2024 See corresponding culture for results  Final     Metabolic Studies       Recent Labs   Lab Test 09/25/24  0738 09/25/24  0648 09/24/24  1742 09/24/24  0845 09/23/24  0751 09/22/24  1052 09/22/24  0820 09/21/24  2158 09/21/24  0925 09/21/24  0118 07/09/18  0739 07/08/18  0147   NA  --  137  --  141 141 139 137  --   --  136 140  --    POTASSIUM  --  4.0  --  3.6 3.6 4.5 4.1 4.6   < > 2.7* 3.5  --    CHLORIDE  --  100  --  102 103  --  102  --   --  100 108  --    CO2  --  29  --  30* 28  --  25  --   --  24 29  --    ANIONGAP  --  8  --  9 10  --  10  --   --  12 3  --    BUN  --  12.1  --  7.2 10.7  --  7.9  --   --  11.4 7  --    CR  --  0.58  --  0.58 0.59  --  0.42*  --   --  0.61 0.74  --    GFRESTIMATED  --  >90  --  >90 >90  --  >90  --   --  >90 >90  --    GLC 98 93 107* 93 111* 160* 149*   --   --  91 92   < >   A1C  --   --   --  5.5  --   --   --   --   --   --   --   --    EUGENE  --  9.0  --  8.9 8.7*  --  8.7*  --   --  8.5* 8.9*  --    LACT  --   --   --   --   --  0.9  --   --   --   --   --   --     < > = values in this interval not displayed.     Hepatic Studies    Recent Labs   Lab Test 09/22/24  0820 09/21/24  0925 07/09/18  0739 07/08/18  0147 07/08/18  0000   BILITOTAL 0.2 0.3 0.5 0.8  --    ALKPHOS 83 91 90 97  --    ALBUMIN 3.6 3.3* 3.8 4.0  --    AST 10 12 16 19 19   ALT 7 8 21 18 18     --   --   --   --      Pancreatitis testing    Recent Labs   Lab Test 07/09/18  0739   TRIG 115*     Hematology Studies      Recent Labs   Lab Test 09/25/24  0648 09/24/24  0845 09/23/24  0751 09/22/24  1558 09/22/24  1052 09/22/24  0946 09/22/24  0820 09/21/24  0118   WBC 6.6 7.8 17.8*  --   --  10.8 12.7* 14.4*   HGB 10.2* 11.1* 10.2* 10.3* 9.7* 5.5* 5.9* 9.7*   HCT 30.9* 34.4* 31.0*  --   --  15.8* 17.6* 29.3*   * 538* 554*  --   --  600* 617* 395     Arterial Blood Gas Testing    Recent Labs   Lab Test 09/22/24  1052   O2PER 90.0          Latest Ref Rng & Units 9/25/2024     6:48 AM 9/24/2024     8:45 AM 9/23/2024     7:51 AM 9/22/2024     9:46 AM 9/22/2024     8:20 AM   Transplant Immunosuppression Labs   Creat 0.51 - 0.95 mg/dL 0.58  0.58  0.59   0.42    Urea Nitrogen 6.0 - 20.0 mg/dL 12.1  7.2  10.7   7.9    WBC 4.0 - 11.0 10e3/uL 6.6  7.8  17.8  10.8  12.7    Neutrophil % 44               Imaging:   Soft tissue neck CT w contrast  Result Date: 9/21/2024  IMPRESSION: 1.  Large multiloculated abscess involving the left masseter muscle and extending posterior to the left mandibular angle with probable early abscess involving the left medial pterygoid muscle. Surrounding cellulitis and reactive edema as described above. 2.  Abscess is likely due to spread of oral bacteria through the nondisplaced left mandibular angle fracture which involves the tooth socket for the third mandibular  molar. 3.  The above findings were discussed directly with Dr. Rae at 2:40 AM CST on 09/21/2024.

## 2024-09-25 NOTE — PROGRESS NOTES
"Shift: 2300 - 0700    VS: Blood pressure 123/80, pulse 68, temperature 98.6  F (37  C), temperature source Oral, resp. rate 17, height 1.6 m (5' 2.99\"), weight 52.9 kg (116 lb 11.2 oz), SpO2 100%.     Pain:  Pt rates their pain 8/10.   Neuro: Alert and oriented x4.   Resp: WDL  Diet: Soft diet.   Skin: WDL  LDA: PIV anterior left lower forearm.   Activity: Independent   Additional Info/shift updates: Pt rates their pain 8/10. Pain was managed with Oxy and Dilaudid.   Call light within reach     Plan of care ongoing   "

## 2024-09-26 LAB
ALBUMIN UR-MCNC: NEGATIVE MG/DL
ANION GAP SERPL CALCULATED.3IONS-SCNC: 10 MMOL/L (ref 7–15)
APPEARANCE UR: CLEAR
BACTERIA BLD CULT: NO GROWTH
BACTERIA TISS BX CULT: ABNORMAL
BILIRUB UR QL STRIP: NEGATIVE
BUN SERPL-MCNC: 13.2 MG/DL (ref 6–20)
CALCIUM SERPL-MCNC: 9 MG/DL (ref 8.8–10.4)
CHLORIDE SERPL-SCNC: 96 MMOL/L (ref 98–107)
COLOR UR AUTO: ABNORMAL
CREAT SERPL-MCNC: 0.59 MG/DL (ref 0.51–0.95)
EGFRCR SERPLBLD CKD-EPI 2021: >90 ML/MIN/1.73M2
ERYTHROCYTE [DISTWIDTH] IN BLOOD BY AUTOMATED COUNT: 13.2 % (ref 10–15)
GLUCOSE BLDC GLUCOMTR-MCNC: 102 MG/DL (ref 70–99)
GLUCOSE BLDC GLUCOMTR-MCNC: 114 MG/DL (ref 70–99)
GLUCOSE SERPL-MCNC: 98 MG/DL (ref 70–99)
GLUCOSE UR STRIP-MCNC: NEGATIVE MG/DL
HCO3 SERPL-SCNC: 28 MMOL/L (ref 22–29)
HCT VFR BLD AUTO: 33.4 % (ref 35–47)
HGB BLD-MCNC: 11 G/DL (ref 11.7–15.7)
HGB UR QL STRIP: ABNORMAL
KETONES UR STRIP-MCNC: NEGATIVE MG/DL
LEUKOCYTE ESTERASE UR QL STRIP: NEGATIVE
MCH RBC QN AUTO: 28 PG (ref 26.5–33)
MCHC RBC AUTO-ENTMCNC: 32.9 G/DL (ref 31.5–36.5)
MCV RBC AUTO: 85 FL (ref 78–100)
NITRATE UR QL: NEGATIVE
PH UR STRIP: 8 [PH] (ref 5–7)
PLATELET # BLD AUTO: 558 10E3/UL (ref 150–450)
POTASSIUM SERPL-SCNC: 4.4 MMOL/L (ref 3.4–5.3)
RBC # BLD AUTO: 3.93 10E6/UL (ref 3.8–5.2)
RBC URINE: 1 /HPF
SODIUM SERPL-SCNC: 134 MMOL/L (ref 135–145)
SP GR UR STRIP: 1.01 (ref 1–1.03)
SQUAMOUS EPITHELIAL: 1 /HPF
UROBILINOGEN UR STRIP-MCNC: NORMAL MG/DL
WBC # BLD AUTO: 8 10E3/UL (ref 4–11)
WBC URINE: 0 /HPF

## 2024-09-26 PROCEDURE — 250N000011 HC RX IP 250 OP 636

## 2024-09-26 PROCEDURE — 99233 SBSQ HOSP IP/OBS HIGH 50: CPT | Performed by: STUDENT IN AN ORGANIZED HEALTH CARE EDUCATION/TRAINING PROGRAM

## 2024-09-26 PROCEDURE — 250N000011 HC RX IP 250 OP 636: Performed by: EMERGENCY MEDICINE

## 2024-09-26 PROCEDURE — 81001 URINALYSIS AUTO W/SCOPE: CPT | Performed by: INTERNAL MEDICINE

## 2024-09-26 PROCEDURE — 250N000013 HC RX MED GY IP 250 OP 250 PS 637

## 2024-09-26 PROCEDURE — 80048 BASIC METABOLIC PNL TOTAL CA: CPT

## 2024-09-26 PROCEDURE — 250N000013 HC RX MED GY IP 250 OP 250 PS 637: Performed by: STUDENT IN AN ORGANIZED HEALTH CARE EDUCATION/TRAINING PROGRAM

## 2024-09-26 PROCEDURE — 250N000013 HC RX MED GY IP 250 OP 250 PS 637: Performed by: INTERNAL MEDICINE

## 2024-09-26 PROCEDURE — 120N000002 HC R&B MED SURG/OB UMMC

## 2024-09-26 PROCEDURE — 85027 COMPLETE CBC AUTOMATED: CPT

## 2024-09-26 PROCEDURE — 99233 SBSQ HOSP IP/OBS HIGH 50: CPT | Performed by: INTERNAL MEDICINE

## 2024-09-26 PROCEDURE — 36415 COLL VENOUS BLD VENIPUNCTURE: CPT

## 2024-09-26 RX ORDER — LACTOBACILLUS RHAMNOSUS GG 10B CELL
1 CAPSULE ORAL 2 TIMES DAILY
Status: DISCONTINUED | OUTPATIENT
Start: 2024-09-26 | End: 2024-09-30 | Stop reason: HOSPADM

## 2024-09-26 RX ORDER — IBUPROFEN 600 MG/1
600 TABLET, FILM COATED ORAL EVERY 6 HOURS PRN
Status: DISCONTINUED | OUTPATIENT
Start: 2024-09-26 | End: 2024-09-30 | Stop reason: HOSPADM

## 2024-09-26 RX ORDER — LORATADINE 10 MG/1
10 TABLET ORAL DAILY
Status: DISCONTINUED | OUTPATIENT
Start: 2024-09-26 | End: 2024-09-30 | Stop reason: HOSPADM

## 2024-09-26 RX ADMIN — MIRTAZAPINE 15 MG: 15 TABLET, ORALLY DISINTEGRATING ORAL at 22:08

## 2024-09-26 RX ADMIN — IBUPROFEN 600 MG: 600 TABLET, FILM COATED ORAL at 12:32

## 2024-09-26 RX ADMIN — HYDROMORPHONE HYDROCHLORIDE 0.2 MG: 0.2 INJECTION, SOLUTION INTRAMUSCULAR; INTRAVENOUS; SUBCUTANEOUS at 14:56

## 2024-09-26 RX ADMIN — AMPICILLIN SODIUM AND SULBACTAM SODIUM 3 G: 2; 1 INJECTION, POWDER, FOR SOLUTION INTRAMUSCULAR; INTRAVENOUS at 00:33

## 2024-09-26 RX ADMIN — Medication 1 CAPSULE: at 22:08

## 2024-09-26 RX ADMIN — CHLORHEXIDINE GLUCONATE, 0.12% ORAL RINSE 15 ML: 1.2 SOLUTION DENTAL at 10:06

## 2024-09-26 RX ADMIN — AMPICILLIN SODIUM AND SULBACTAM SODIUM 3 G: 2; 1 INJECTION, POWDER, FOR SOLUTION INTRAMUSCULAR; INTRAVENOUS at 17:58

## 2024-09-26 RX ADMIN — CHLORHEXIDINE GLUCONATE, 0.12% ORAL RINSE 15 ML: 1.2 SOLUTION DENTAL at 22:11

## 2024-09-26 RX ADMIN — OXYCODONE HYDROCHLORIDE 5 MG: 5 TABLET ORAL at 17:10

## 2024-09-26 RX ADMIN — ACETAMINOPHEN 650 MG: 325 TABLET, FILM COATED ORAL at 17:10

## 2024-09-26 RX ADMIN — HYDROMORPHONE HYDROCHLORIDE 0.2 MG: 0.2 INJECTION, SOLUTION INTRAMUSCULAR; INTRAVENOUS; SUBCUTANEOUS at 17:58

## 2024-09-26 RX ADMIN — AMPICILLIN SODIUM AND SULBACTAM SODIUM 3 G: 2; 1 INJECTION, POWDER, FOR SOLUTION INTRAMUSCULAR; INTRAVENOUS at 12:33

## 2024-09-26 RX ADMIN — HYDROMORPHONE HYDROCHLORIDE 0.2 MG: 0.2 INJECTION, SOLUTION INTRAMUSCULAR; INTRAVENOUS; SUBCUTANEOUS at 05:48

## 2024-09-26 RX ADMIN — HYDROMORPHONE HYDROCHLORIDE 0.2 MG: 0.2 INJECTION, SOLUTION INTRAMUSCULAR; INTRAVENOUS; SUBCUTANEOUS at 22:12

## 2024-09-26 RX ADMIN — LORATADINE 10 MG: 10 TABLET ORAL at 12:32

## 2024-09-26 RX ADMIN — Medication 1 CAPSULE: at 12:32

## 2024-09-26 RX ADMIN — HYDROMORPHONE HYDROCHLORIDE 0.2 MG: 0.2 INJECTION, SOLUTION INTRAMUSCULAR; INTRAVENOUS; SUBCUTANEOUS at 10:12

## 2024-09-26 RX ADMIN — IBUPROFEN 600 MG: 600 TABLET, FILM COATED ORAL at 17:58

## 2024-09-26 RX ADMIN — ACETAMINOPHEN 650 MG: 325 TABLET, FILM COATED ORAL at 10:12

## 2024-09-26 RX ADMIN — AMPICILLIN SODIUM AND SULBACTAM SODIUM 3 G: 2; 1 INJECTION, POWDER, FOR SOLUTION INTRAMUSCULAR; INTRAVENOUS at 05:48

## 2024-09-26 ASSESSMENT — ACTIVITIES OF DAILY LIVING (ADL)
ADLS_ACUITY_SCORE: 20

## 2024-09-26 NOTE — PROGRESS NOTES
General ID Service: Follow Up Note      Patient:  Dunia Corley, Date of birth 2000, Medical record number [id number]  Date of Visit:  September 25, 2024         Assessment and Recommendations:   Problem List:  1. Large multiloculated abscess of left masseter, abscess of left medial pterygoid, cellulitis  - S/p I&D 9/22 with purulence from fracture site  - Intra-op cultures With Fusobacterium, Eikenella, and MSSA  Nondisplaced left mandibular angle fracture   s/p I&D, extraction of teeth #16 aand 17, and ORIF of left mandible angle fracture 9/22/24  Hardware: plate and screws- left mandible    Recommendations:  Continue ampicillin-sulbactam  On Saturday, would be okay to transition to Augmentin 875/125mg PO BID.   Would plan for 6 week course given unclear duration of purulence in fracture, possible bony involvement in infection, and presence of hardware.  Abx end date of 11/3/24  Has appointment scheduled in our clinic on 11/1/24 at 1pm    ID will sign off at this time, but please do not hesitate to call with any questions!    Discussion:  25yo F admitted with multiloculated left masseter abscess and left mandibular fracture, now s/p operative I&D, ORIF. Cultures from OR growing MSSA, Fusobacterium, and Eikenella.     Doing well today - gradual improvement in swelling of left mandible/cheek and improvement in pain, though still a bit along jawline. Discussed longer abx plan, which pt was agreeable to.     Will plan for transition to PO abx Saturday, and have scheduled clinic follow-up for her in ID clinic on 11/1/24 at 1pm.    Peter Mccain MD  Division of Infectious Diseases and International Medicine  P: [phone]        Interval History:     Seen and examined - doing well, tolerating abx without issues. Says she has a bit of left neck pain, similar to yesterday, along her jawline/mastoid.         Review of Systems:     Full 9 pt ROS obtained and negative unless noted above in assessment and  interval history.         Current Antimicrobials     Vancomycin  Amp-sulbactam         Physical Exam:   Ranges for vital signs:  Temp:  [97.5  F (36.4  C)-99.7  F (37.6  C)] 97.5  F (36.4  C)  Pulse:  [58-76] 58  Resp:  [16-17] 16  BP: (111-128)/(73-80) 111/75  SpO2:  [94 %-99 %] 94 %  No intake or output data in the 24 hours ending 09/25/24 1524    Exam:  GENERAL:  awake, alert, oriented, pleasant and interactive. Resting comfortable in bed. Grandmother at bedside.  HEENT:  Left facial swelling decreasing, no dolly erythema.   EYES:  Eyes have anicteric sclerae without conjunctival injection or discharge    LUNGS:  Breathing easily on room air.  SKIN:  No acute rashes, jaundice or diaphoresis.   NEUROLOGIC:  Grossly nonfocal. Active x4 extremities         Laboratory Data:   Reviewed.  Pertinent for:    Microbiology:  Culture   Date Value Ref Range Status   09/22/2024 1+ Fusobacterium nucleatum (A)  Final     Comment:     Susceptibilities not routinely done, refer to antibiogram to view typical susceptibility profiles   09/22/2024 2+ Mixed Aerobic and Anaerobic stewart  Final   09/22/2024 2+ Normal stewart  Final   09/22/2024 1+ Eikenella corrodens (A)  Final   09/22/2024 1+ Staphylococcus aureus (A)  Final   09/21/2024 No growth after 4 days  Preliminary     GS Culture   Date Value Ref Range Status   09/22/2024 See corresponding culture for results  Final     Metabolic Studies       Recent Labs   Lab Test 09/26/24  0827 09/26/24  0649 09/25/24  2050 09/25/24  0738 09/25/24  0648 09/24/24  1742 09/24/24  0845 09/23/24  0751 09/22/24  1052 09/22/24  0820 09/21/24  0925 09/21/24  0118   NA  --  134*  --   --  137  --  141 141 139 137  --  136   POTASSIUM  --  4.4  --   --  4.0  --  3.6 3.6 4.5 4.1   < > 2.7*   CHLORIDE  --  96*  --   --  100  --  102 103  --  102  --  100   CO2  --  28  --   --  29  --  30* 28  --  25  --  24   ANIONGAP  --  10  --   --  8  --  9 10  --  10  --  12   BUN  --  13.2  --   --  12.1  --  7.2  10.7  --  7.9  --  11.4   CR  --  0.59  --   --  0.58  --  0.58 0.59  --  0.42*  --  0.61   GFRESTIMATED  --  >90  --   --  >90  --  >90 >90  --  >90  --  >90   * 98 116* 98 93 107* 93 111* 160* 149*  --  91   A1C  --   --   --   --   --   --  5.5  --   --   --   --   --    EUGENE  --  9.0  --   --  9.0  --  8.9 8.7*  --  8.7*  --  8.5*   LACT  --   --   --   --   --   --   --   --  0.9  --   --   --     < > = values in this interval not displayed.     Hepatic Studies    Recent Labs   Lab Test 09/22/24  0820 09/21/24  0925 07/09/18  0739 07/08/18  0147 07/08/18  0000   BILITOTAL 0.2 0.3 0.5 0.8  --    ALKPHOS 83 91 90 97  --    ALBUMIN 3.6 3.3* 3.8 4.0  --    AST 10 12 16 19 19   ALT 7 8 21 18 18     --   --   --   --      Pancreatitis testing    Recent Labs   Lab Test 07/09/18  0739   TRIG 115*     Hematology Studies      Recent Labs   Lab Test 09/26/24  0649 09/25/24  0648 09/24/24  0845 09/23/24  0751 09/22/24  1558 09/22/24  1052 09/22/24  0946 09/22/24  0820   WBC 8.0 6.6 7.8 17.8*  --   --  10.8 12.7*   HGB 11.0* 10.2* 11.1* 10.2* 10.3* 9.7* 5.5* 5.9*   HCT 33.4* 30.9* 34.4* 31.0*  --   --  15.8* 17.6*   * 502* 538* 554*  --   --  600* 617*     Arterial Blood Gas Testing    Recent Labs   Lab Test 09/22/24  1052   O2PER 90.0          Latest Ref Rng & Units 9/26/2024     6:49 AM 9/25/2024     6:48 AM 9/24/2024     8:45 AM 9/23/2024     7:51 AM 9/22/2024     9:46 AM   Transplant Immunosuppression Labs   Creat 0.51 - 0.95 mg/dL 0.59  0.58  0.58  0.59     Urea Nitrogen 6.0 - 20.0 mg/dL 13.2  12.1  7.2  10.7     WBC 4.0 - 11.0 10e3/uL 8.0  6.6  7.8  17.8  10.8    Neutrophil %  44              Imaging:   Soft tissue neck CT w contrast  Result Date: 9/21/2024  IMPRESSION: 1.  Large multiloculated abscess involving the left masseter muscle and extending posterior to the left mandibular angle with probable early abscess involving the left medial pterygoid muscle. Surrounding cellulitis and reactive  edema as described above. 2.  Abscess is likely due to spread of oral bacteria through the nondisplaced left mandibular angle fracture which involves the tooth socket for the third mandibular molar. 3.  The above findings were discussed directly with Dr. Rae at 2:40 AM CST on 09/21/2024.

## 2024-09-26 NOTE — PROGRESS NOTES
"Shift: 2300 - 0700    VS: Blood pressure 114/73, pulse 70, temperature 99.2  F (37.3  C), temperature source Oral, resp. rate 16, height 1.6 m (5' 2.99\"), weight 52.9 kg (116 lb 11.2 oz), SpO2 99%.     Neuro: Alert and oriented x4.   Resp: WDL  Diet: Soft diet  Skin: WDL  LDA: PIV anterior distal right lower forearm.   Activity: Independent   Additional Info/shift updates: Pt rates their pain a 10/10 Dilaudid was given for her pain.   Call light within reach     Plan of care ongoing   " Chart reviewed. Patient was last seen 5/4/2018  and has no f/u  Appointment. Script was routed to PCP to address per protocol.

## 2024-09-26 NOTE — PLAN OF CARE
[id number]    Goal Outcome Evaluation:      Plan of Care Reviewed With: patient    Overall Patient Progress: improvingOverall Patient Progress: improving    Patient is A&O x4. Call light within reach. Able to make needs known. Independent in the room. Voids spontaneously to the bathroom. LBM: 09/25.    RA. Mechanical/Dental soft diet. PIV on R lower forearm, SL. L facial pain radiating to her L ear managed with PO Oxy and IV dilaudid. Denies SOB, chest pain, n/v and n/t. L face and upper lip swelling improved. Offered warm packs and placed on bedside.    RN managed K. AM lab draws placed.     Continue with POC.

## 2024-09-26 NOTE — PROGRESS NOTES
ORAL & MAXILLOFACIALSURGERY   INPATIENT PROGRESS NOTE    Dunia Corley   : 2000   Sex: female   MRN: [id number]                  2024 5:40 AM    ASSESSMENT:  Dunia Corley is a 24 year old y.o. female POD3 s/p ORIF of left mandible, I&D of left facial abscess and extraction of teeth #16,17 in OR on 24. Patient is following expected post op course. Denies any other constitutional symptoms at this time.    PLAN:  - No additional surgical interventions planned from OMFS perspective at this time  - OMFS will continue to follow while inpatient  - Continue IV abx while inpatient, unasyn 3g Q6h usually provides adequate coverage for oral polymicrobial infections, ID recs for IV Vancomycin 750 mg q8h to broaden coverage for staph aureus  - Cultures results: staph aureus and eikenella corrodens  - HOB>30  - Warm packs to face, NO ICE  - Encouraged continued ambulation   - Peridex rinses BID  - Resume normal oral hygiene  - Ok for soft diet from OMFS perspective  - Pain management per primary; appreciate cares- recommend adding Tylenol 500mg q4h and Ibuprofen 600mg q4h to regimen  - Patient is stable from OMFS standpoint for discharge at this time. She will follow up in our clinic one week from discharge. Address listed below. Our  will call patient and schedule accordingly    Planned dispo is for this     Pt evaluated with OMS Chief Resident, Dr. Wilson, who will discuss the plan with Dr. Miner, attending.      Benson Novak DDS  Oral and Maxillofacial Surgery Resident  __________________________________________________________  SUBJECTIVE:  Patient found in sleeping comfortably in bed. Patient reports doing well. Patient reports current pain is about the same at ~8/10. Pain is controlled but she is experiencing new ear pain on the left side that radiates down into her angle of mandible area. Tolerating diet. Denies nausea or vomiting, chest pain or shortness of  breath, fever or chills. Is ambulating w/o problems.  .  OBJECTIVE:  VITALS:  Patient Vitals for the past 24 hrs:   BP Temp Temp src Pulse Resp SpO2   09/25/24 2209 114/73 99.2  F (37.3  C) Oral 70 16 99 %   09/25/24 1542 128/80 98.8  F (37.1  C) Oral 76 17 99 %   09/25/24 0730 118/73 99.8  F (37.7  C) Oral 59 17 100 %         Exam:  Neuro: alert, oriented, NAD   HEENT: head normocephalic, atraumatic, edema consistent with postoperative findings on left side, TTP along inferior border of left mandible that is still indurated. During palpation pain radiates up to left ear.  Oral: edema as expected, hemostatic intact intraoral incisions, Sutures C/I, Hardware intact, SIMEON ~25mm. FOM NT/ND. Extraction sites are hemostatic. Sutures intraorally intact with no concerns for food entrapment or significant swelling present. No intraoral purulence appreciated at this time.  Skin: no rashes or lesions. Dressing/Sutures c/d/i.   CV: no JVD appreciated, RRR   Pulm: b/l = chest rise, breathing easily   Abd: non distended, non tender, soft   Ext: warm, well-perfused    Meds:  Current Facility-Administered Medications   Medication Dose Route Frequency Provider Last Rate Last Admin    acetaminophen (TYLENOL) tablet 650 mg  650 mg Oral Q4H PRN Madai Briseno PA-C   650 mg at 09/23/24 0340    ampicillin-sulbactam (UNASYN) 3 g vial to attach to  mL bag  3 g Intravenous Q6H Maribell Johnson  mL/hr at 09/26/24 0033 3 g at 09/26/24 0033    carboxymethylcellulose PF (REFRESH PLUS) 0.5 % ophthalmic solution 1 drop  1 drop Right Eye Q1H PRN Russell Herrera MD   1 drop at 09/22/24 2126    chlorhexidine (PERIDEX) 0.12 % solution 15 mL  15 mL Swish & Spit BID Russell Herrera MD   15 mL at 09/25/24 2011    HYDROmorphone (DILAUDID) injection 0.2 mg  0.2 mg Intravenous Q3H PRN Madai Briseno PA-C   0.2 mg at 09/25/24 2200    lidocaine (LMX4) cream   Topical Q1H PRN Madai Briseno PA-C        lidocaine 1 % 0.1-1 mL  0.1-1 mL Other  Q1H PRN Madai Briseno PA-C        mirtazapine (REMERON SOL-TAB) ODT tab 15 mg  15 mg Orally disintegrating tablet At Bedtime Russell Herrera MD   15 mg at 09/25/24 2208    naloxone (NARCAN) injection 0.2 mg  0.2 mg Intravenous Q2 Min PRN Russell Herrera MD        Or    naloxone (NARCAN) injection 0.4 mg  0.4 mg Intravenous Q2 Min PRN Russell Herrera MD        Or    naloxone (NARCAN) injection 0.2 mg  0.2 mg Intramuscular Q2 Min PRN Russell Herrera MD        Or    naloxone (NARCAN) injection 0.4 mg  0.4 mg Intramuscular Q2 Min PRN Russell Herrera MD        oxyCODONE (ROXICODONE) tablet 5 mg  5 mg Oral Q4H PRN Madai Briseno PA-C   5 mg at 09/25/24 2012    sodium chloride (PF) 0.9% PF flush 3 mL  3 mL Intracatheter Q8H Madai Briseno PA-C   3 mL at 09/25/24 1750    sodium chloride (PF) 0.9% PF flush 3 mL  3 mL Intracatheter q1 min prn Madai Briseno PA-C   3 mL at 09/24/24 1547      Labs:    Admission on 09/21/2024   Component Date Value Ref Range Status    Potassium 09/21/2024 3.0 (L)  3.4 - 5.3 mmol/L Final    INR 09/21/2024 1.10  0.85 - 1.15 Final    aPTT 09/21/2024 36  22 - 38 Seconds Final    Protein Total 09/21/2024 6.3 (L)  6.4 - 8.3 g/dL Final    Albumin 09/21/2024 3.3 (L)  3.5 - 5.2 g/dL Final    Bilirubin Total 09/21/2024 0.3  <=1.2 mg/dL Final    Alkaline Phosphatase 09/21/2024 91  40 - 150 U/L Final    AST 09/21/2024 12  0 - 45 U/L Final    ALT 09/21/2024 8  0 - 50 U/L Final    Bilirubin Direct 09/21/2024 <0.20  0.00 - 0.30 mg/dL Final    Acetaminophen 09/21/2024 <5.0 (L)  10.0 - 30.0 ug/mL Final    Culture 09/21/2024 No growth after 4 days   Preliminary    Potassium 09/21/2024 4.6  3.4 - 5.3 mmol/L Final    Sodium 09/22/2024 137  135 - 145 mmol/L Final    Potassium 09/22/2024 4.1  3.4 - 5.3 mmol/L Final    Chloride 09/22/2024 102  98 - 107 mmol/L Final    Carbon Dioxide (CO2) 09/22/2024 25  22 - 29 mmol/L Final    Anion Gap 09/22/2024 10  7 - 15 mmol/L Final    Urea Nitrogen 09/22/2024 7.9  6.0 -  20.0 mg/dL Final    Creatinine 09/22/2024 0.42 (L)  0.51 - 0.95 mg/dL Final    GFR Estimate 09/22/2024 >90  >60 mL/min/1.73m2 Final    eGFR calculated using 2021 CKD-EPI equation.    Calcium 09/22/2024 8.7 (L)  8.8 - 10.4 mg/dL Final    Reference intervals for this test were updated on 7/16/2024 to reflect our healthy population more accurately. There may be differences in the flagging of prior results with similar values performed with this method. Those prior results can be interpreted in the context of the updated reference intervals.    Glucose 09/22/2024 149 (H)  70 - 99 mg/dL Final    WBC Count 09/22/2024 12.7 (H)  4.0 - 11.0 10e3/uL Final    RBC Count 09/22/2024 2.06 (L)  3.80 - 5.20 10e6/uL Final    Hemoglobin 09/22/2024 5.9 (LL)  11.7 - 15.7 g/dL Final    Hematocrit 09/22/2024 17.6 (L)  35.0 - 47.0 % Final    MCV 09/22/2024 85  78 - 100 fL Final    MCH 09/22/2024 28.6  26.5 - 33.0 pg Final    MCHC 09/22/2024 33.5  31.5 - 36.5 g/dL Final    RDW 09/22/2024 13.8  10.0 - 15.0 % Final    Platelet Count 09/22/2024 617 (H)  150 - 450 10e3/uL Final    WBC Count 09/22/2024 10.8  4.0 - 11.0 10e3/uL Final    RBC Count 09/22/2024 1.89 (L)  3.80 - 5.20 10e6/uL Final    Hemoglobin 09/22/2024 5.5 (LL)  11.7 - 15.7 g/dL Final    Hematocrit 09/22/2024 15.8 (L)  35.0 - 47.0 % Final    MCV 09/22/2024 84  78 - 100 fL Final    MCH 09/22/2024 29.1  26.5 - 33.0 pg Final    MCHC 09/22/2024 34.8  31.5 - 36.5 g/dL Final    RDW 09/22/2024 14.1  10.0 - 15.0 % Final    Platelet Count 09/22/2024 600 (H)  150 - 450 10e3/uL Final    ABO/RH(D) 09/22/2024 O POS   Final    Antibody Screen 09/22/2024 Negative  Negative Final    SPECIMEN EXPIRATION DATE 09/22/2024 [id number]   Final    Culture 09/22/2024 1+ Fusobacterium nucleatum (A)   Final    Susceptibilities not routinely done, refer to antibiogram to view typical susceptibility profiles    Culture 09/22/2024 2+ Mixed Aerobic and Anaerobic stewart   Final    GS Culture 09/22/2024 See  corresponding culture for results   Final    Gram Stain Result 09/22/2024 2+ Gram positive cocci (A)   Final    Gram Stain Result 09/22/2024 1+ Gram negative bacilli (A)   Final    Gram Stain Result 09/22/2024 4+ WBC seen (A)   Final    Predominantly PMNs    Culture 09/22/2024 2+ Normal stewart   Preliminary    Culture 09/22/2024 1+ Eikenella corrodens (A)   Preliminary    Culture 09/22/2024 1+ Staphylococcus aureus (A)   Preliminary    Ferritin 09/22/2024 133  6 - 175 ng/mL Final    Iron 09/22/2024 17 (L)  37 - 145 ug/dL Final    Iron Binding Capacity 09/22/2024 271  240 - 430 ug/dL Final    Iron Sat Index 09/22/2024 6 (L)  15 - 46 % Final    Lactate Dehydrogenase 09/22/2024 145  0 - 250 U/L Final    % Reticulocyte 09/22/2024 0.8  0.5 - 2.0 % Final    Absolute Reticulocyte 09/22/2024 0.015 (L)  0.025 - 0.095 10e6/uL Final    Vitamin B12 09/22/2024 1,464 (H)  232 - 1,245 pg/mL Final    Protein Total 09/22/2024 6.7  6.4 - 8.3 g/dL Final    Albumin 09/22/2024 3.6  3.5 - 5.2 g/dL Final    Bilirubin Total 09/22/2024 0.2  <=1.2 mg/dL Final    Alkaline Phosphatase 09/22/2024 83  40 - 150 U/L Final    AST 09/22/2024 10  0 - 45 U/L Final    ALT 09/22/2024 7  0 - 50 U/L Final    Bilirubin Direct 09/22/2024 <0.20  0.00 - 0.30 mg/dL Final    Haptoglobin 09/22/2024 347 (H)  30 - 200 mg/dL Final    Blood Component Type 09/22/2024 Red Blood Cells   Preliminary    Product Code 09/22/2024 [id number]   Preliminary    Unit Status 09/22/2024 Returned   Preliminary    Unit Number 09/22/2024 [id number]   Preliminary    CROSSMATCH 09/22/2024 Compatible   Preliminary    CODING SYSTEM 09/22/2024 [id number]   Preliminary    ISSUE DATE AND TIME 09/22/2024 [id number]   Preliminary    UNIT ABO/RH 09/22/2024 O+   Preliminary    UNIT TYPE ISBT 09/22/2024 5100   Preliminary    Blood Component Type 09/22/2024 Red Blood Cells   Preliminary    Product Code 09/22/2024 [id number]   Preliminary    Unit Status 09/22/2024 Returned   Preliminary    Unit  Number 09/22/2024 [id number]   Preliminary    CROSSMATCH 09/22/2024 Compatible   Preliminary    CODING SYSTEM 09/22/2024 [id number]   Preliminary    ISSUE DATE AND TIME 09/22/2024 [id number]   Preliminary    UNIT ABO/RH 09/22/2024 O+   Preliminary    UNIT TYPE ISBT 09/22/2024 5100   Preliminary    pH Venous POCT 09/22/2024 7.46 (H)  7.32 - 7.43 Final    CRITICAL RESULTS DOCUMENTED BY CATH STAFF AND MD    pCO2 Venous POCT 09/22/2024 36 (L)  40 - 50 mm Hg Final    CRITICAL RESULTS DOCUMENTED BY CATH STAFF AND MD    pO2 Venous POCT 09/22/2024 217 (H)  25 - 47 mm Hg Final    CRITICAL RESULTS DOCUMENTED BY CATH STAFF AND MD    Bicarbonate Venous POCT 09/22/2024 26  21 - 28 mmol/L Final    CRITICAL RESULTS DOCUMENTED BY CATH STAFF AND MD    Sodium POCT 09/22/2024 139  135 - 145 mmol/L Final    CRITICAL RESULTS DOCUMENTED BY CATH STAFF AND MD    Potassium POCT 09/22/2024 4.5  3.4 - 5.3 mmol/L Final    CRITICAL RESULTS DOCUMENTED BY CATH STAFF AND MD    Hemoglobin POCT 09/22/2024 9.7 (L)  11.7 - 15.7 g/dL Final    CRITICAL RESULTS DOCUMENTED BY CATH STAFF AND MD    Oxyhemoglobin Venous POCT 09/22/2024 99 (H)  70 - 75 % Final    CRITICAL RESULTS DOCUMENTED BY CATH STAFF AND MD    Glucose Whole Blood POCT 09/22/2024 160 (H)  70 - 99 mg/dL Final    CRITICAL RESULTS DOCUMENTED BY CATH STAFF AND MD    Base Excess/Deficit (+/-) POCT 09/22/2024 1.9  -3.0 - 3.0 mmol/L Final    CRITICAL RESULTS DOCUMENTED BY CATH STAFF AND MD    Calcium, Ionized Whole Blood POCT 09/22/2024 4.6  4.4 - 5.2 mg/dL Final    CRITICAL RESULTS DOCUMENTED BY CATH STAFF AND MD    O2 Sat, Venous POCT 09/22/2024 100 (H)  70 - 75 % Final    CRITICAL RESULTS DOCUMENTED BY CATH STAFF AND MD    Lactic Acid POCT 09/22/2024 0.9  0.7 - 2.0 mmol/L Final    CRITICAL RESULTS DOCUMENTED BY CATH STAFF AND MD    FIO2 POCT 09/22/2024 90.0  % Final    Hemoglobin 09/22/2024 10.3 (L)  11.7 - 15.7 g/dL Final    Sodium 09/23/2024 141  135 - 145 mmol/L Final    Potassium  09/23/2024 3.6  3.4 - 5.3 mmol/L Final    Chloride 09/23/2024 103  98 - 107 mmol/L Final    Carbon Dioxide (CO2) 09/23/2024 28  22 - 29 mmol/L Final    Anion Gap 09/23/2024 10  7 - 15 mmol/L Final    Urea Nitrogen 09/23/2024 10.7  6.0 - 20.0 mg/dL Final    Creatinine 09/23/2024 0.59  0.51 - 0.95 mg/dL Final    GFR Estimate 09/23/2024 >90  >60 mL/min/1.73m2 Final    eGFR calculated using 2021 CKD-EPI equation.    Calcium 09/23/2024 8.7 (L)  8.8 - 10.4 mg/dL Final    Reference intervals for this test were updated on 7/16/2024 to reflect our healthy population more accurately. There may be differences in the flagging of prior results with similar values performed with this method. Those prior results can be interpreted in the context of the updated reference intervals.    Glucose 09/23/2024 111 (H)  70 - 99 mg/dL Final    WBC Count 09/23/2024 17.8 (H)  4.0 - 11.0 10e3/uL Final    RBC Count 09/23/2024 3.58 (L)  3.80 - 5.20 10e6/uL Final    Hemoglobin 09/23/2024 10.2 (L)  11.7 - 15.7 g/dL Final    Hematocrit 09/23/2024 31.0 (L)  35.0 - 47.0 % Final    MCV 09/23/2024 87  78 - 100 fL Final    MCH 09/23/2024 28.5  26.5 - 33.0 pg Final    MCHC 09/23/2024 32.9  31.5 - 36.5 g/dL Final    RDW 09/23/2024 13.9  10.0 - 15.0 % Final    Platelet Count 09/23/2024 554 (H)  150 - 450 10e3/uL Final    Folic Acid 09/23/2024 3.6 (L)  4.6 - 34.8 ng/mL Final    CRP Inflammation 09/23/2024 95.67 (H)  <5.00 mg/L Final    Sodium 09/24/2024 141  135 - 145 mmol/L Final    Potassium 09/24/2024 3.6  3.4 - 5.3 mmol/L Final    Chloride 09/24/2024 102  98 - 107 mmol/L Final    Carbon Dioxide (CO2) 09/24/2024 30 (H)  22 - 29 mmol/L Final    Anion Gap 09/24/2024 9  7 - 15 mmol/L Final    Urea Nitrogen 09/24/2024 7.2  6.0 - 20.0 mg/dL Final    Creatinine 09/24/2024 0.58  0.51 - 0.95 mg/dL Final    GFR Estimate 09/24/2024 >90  >60 mL/min/1.73m2 Final    eGFR calculated using 2021 CKD-EPI equation.    Calcium 09/24/2024 8.9  8.8 - 10.4 mg/dL Final     Reference intervals for this test were updated on 7/16/2024 to reflect our healthy population more accurately. There may be differences in the flagging of prior results with similar values performed with this method. Those prior results can be interpreted in the context of the updated reference intervals.    Glucose 09/24/2024 93  70 - 99 mg/dL Final    WBC Count 09/24/2024 7.8  4.0 - 11.0 10e3/uL Final    RBC Count 09/24/2024 3.96  3.80 - 5.20 10e6/uL Final    Hemoglobin 09/24/2024 11.1 (L)  11.7 - 15.7 g/dL Final    Hematocrit 09/24/2024 34.4 (L)  35.0 - 47.0 % Final    MCV 09/24/2024 87  78 - 100 fL Final    MCH 09/24/2024 28.0  26.5 - 33.0 pg Final    MCHC 09/24/2024 32.3  31.5 - 36.5 g/dL Final    RDW 09/24/2024 13.9  10.0 - 15.0 % Final    Platelet Count 09/24/2024 538 (H)  150 - 450 10e3/uL Final    Estimated Average Glucose 09/24/2024 111  <117 mg/dL Final    Hemoglobin A1C 09/24/2024 5.5  <5.7 % Final    Normal <5.7%   Prediabetes 5.7-6.4%    Diabetes 6.5% or higher     Note: Adopted from ADA consensus guidelines.    GLUCOSE BY METER POCT 09/24/2024 107 (H)  70 - 99 mg/dL Final    Dr/RN Notified    Sodium 09/25/2024 137  135 - 145 mmol/L Final    Potassium 09/25/2024 4.0  3.4 - 5.3 mmol/L Final    Chloride 09/25/2024 100  98 - 107 mmol/L Final    Carbon Dioxide (CO2) 09/25/2024 29  22 - 29 mmol/L Final    Anion Gap 09/25/2024 8  7 - 15 mmol/L Final    Urea Nitrogen 09/25/2024 12.1  6.0 - 20.0 mg/dL Final    Creatinine 09/25/2024 0.58  0.51 - 0.95 mg/dL Final    GFR Estimate 09/25/2024 >90  >60 mL/min/1.73m2 Final    eGFR calculated using 2021 CKD-EPI equation.    Calcium 09/25/2024 9.0  8.8 - 10.4 mg/dL Final    Reference intervals for this test were updated on 7/16/2024 to reflect our healthy population more accurately. There may be differences in the flagging of prior results with similar values performed with this method. Those prior results can be interpreted in the context of the updated reference  intervals.    Glucose 09/25/2024 93  70 - 99 mg/dL Final    CRP Inflammation 09/25/2024 30.71 (H)  <5.00 mg/L Final    WBC Count 09/25/2024 6.6  4.0 - 11.0 10e3/uL Final    RBC Count 09/25/2024 3.56 (L)  3.80 - 5.20 10e6/uL Final    Hemoglobin 09/25/2024 10.2 (L)  11.7 - 15.7 g/dL Final    Hematocrit 09/25/2024 30.9 (L)  35.0 - 47.0 % Final    MCV 09/25/2024 87  78 - 100 fL Final    MCH 09/25/2024 28.7  26.5 - 33.0 pg Final    MCHC 09/25/2024 33.0  31.5 - 36.5 g/dL Final    RDW 09/25/2024 13.4  10.0 - 15.0 % Final    Platelet Count 09/25/2024 502 (H)  150 - 450 10e3/uL Final    % Neutrophils 09/25/2024 44  % Final    % Lymphocytes 09/25/2024 41  % Final    % Monocytes 09/25/2024 9  % Final    % Eosinophils 09/25/2024 1  % Final    % Basophils 09/25/2024 1  % Final    % Immature Granulocytes 09/25/2024 4  % Final    NRBCs per 100 WBC 09/25/2024 0  <1 /100 Final    Absolute Neutrophils 09/25/2024 2.9  1.6 - 8.3 10e3/uL Final    Absolute Lymphocytes 09/25/2024 2.7  0.8 - 5.3 10e3/uL Final    Absolute Monocytes 09/25/2024 0.6  0.0 - 1.3 10e3/uL Final    Absolute Eosinophils 09/25/2024 0.1  0.0 - 0.7 10e3/uL Final    Absolute Basophils 09/25/2024 0.1  0.0 - 0.2 10e3/uL Final    Absolute Immature Granulocytes 09/25/2024 0.3  <=0.4 10e3/uL Final    Absolute NRBCs 09/25/2024 0.0  10e3/uL Final    GLUCOSE BY METER POCT 09/25/2024 98  70 - 99 mg/dL Final    GLUCOSE BY METER POCT 09/25/2024 116 (H)  70 - 99 mg/dL Final    Dr/RN Notified       Imaging:    Results for orders placed or performed during the hospital encounter of 09/20/24   Soft tissue neck CT w contrast    Narrative    EXAM: CT SOFT TISSUE NECK W CONTRAST  LOCATION: [facility]  DATE: 9/21/2024    INDICATION: 4 days of left sided facial swelling going down towards her left jaw and neck, likely from dental abscess.  COMPARISON: None.  CONTRAST: ISOVUE 370 80ML  TECHNIQUE: Routine CT Soft Tissue Neck with IV contrast. Multiplanar reformats.  Dose reduction techniques were used.    FINDINGS:     MUCOSAL SPACES/SOFT TISSUES: Nondisplaced fracture left mandibular angle involves the to the socket for the third mandibular molar. Multiloculated abscess in the asymmetrically enlarged left masseter muscle and extending posterior to the mandibular angle   measures 4.6 cm AP by 2.5 cm transverse by 3.5 cm craniocaudal. There is probably also an early ill-defined abscess involving the asymmetrically enlarged left medial pterygoid muscle. There is significant surrounding soft tissue swelling and   subcutaneous edema in the left buccal region which extends into the left submandibular space. There is also edema extending into the medially along the anterior margin of the sternocleidomastoid muscle into the parapharyngeal space. Normal mucosal spaces   of the upper aerodigestive tract. No mucosal mass or inflammation identified. Normal vocal cords and infraglottic trachea. Normal oral cavity,  spaces, and floor of mouth structures. Dental caries involving the left third maxillary molar.    LYMPH NODES: Prominent reactive left submandibular lymph nodes measure up to 0.9 cm in short axis.     SALIVARY GLANDS: Normal parotid and submandibular glands.    THYROID: Normal.     VESSELS: Vascular structures of the neck are patent.    VISUALIZED INTRACRANIAL/ORBITS/SINUSES: No abnormality of the visualized intracranial compartment. Small old left medial orbital wall blowout fracture. Otherwise orbits unremarkable. Visualized paranasal sinuses and mastoid air cells are clear.    OTHER: No destructive osseous lesion. The included lung apices are clear.      Impression    IMPRESSION:   1.  Large multiloculated abscess involving the left masseter muscle and extending posterior to the left mandibular angle with probable early abscess involving the left medial pterygoid muscle. Surrounding cellulitis and reactive edema as described above.  2.  Abscess is likely due to  spread of oral bacteria through the nondisplaced left mandibular angle fracture which involves the tooth socket for the third mandibular molar.    3.  The above findings were discussed directly with Dr. Rae at 2:40 AM CST on 09/21/2024.

## 2024-09-26 NOTE — DISCHARGE INSTRUCTIONS
Retr un to ER for fever, increased pain swelling or drainage     HOME CARE INSTRUCTIONS FOLLOWING SURGERY    WOUNDS  The wounds in your mouth and skin should be left alone and undisturbed for the first 24 hours after surgery. Do not rinse your mouth or use a mouthwash for 3 days following surgery. Avoid probing the wound with anything and do not use a water-pick during your healing course. Trauma to incisions can result loosening of sutures and opening of wounds. If you smoke please refrain for as long as possible, up to a week (or forever) is beneficial to healing.      NAUSEA AND VOMITING   You may experience some nausea or vomiting after surgery.  It is important to realize that this is not a life threatening situation since your stomach is empty.  If you have had some liquids, remember anything hat went in through the teeth can come out through the teeth if you are wired shut.    If vomiting does occur; (1) remain calm; (2) call the nurse so he/she may assist you; (3) turn on your side or sit up and lean forward so that any fluid produced can be emptied from your mouth. Most patients will not be wired shut but there are some exceptions. You will also be taught how to use a suction device to clear your mouth.  This will be available to you in both the recovery room and in your room when you return to it after surgery. The nurses who care for you are used to dealing with patients who have their jaws with elastics holding them together.  Scissors will be available to these nurses on your morley, even though it is very unusual to have to cut the elastics that hold your jaws in position.    SWELLING  Swelling occurs after all surgery.  The degree of swelling is quite variable in different individuals.  More swelling usually occurs with the lower jaw surgery.  Swelling will continue to increase for approximately 48 to 72 hours following surgery, and then will resolve within 10 days to 2 weeks. We try to minimize your  swelling with a medication but some swelling should be anticipated.  Also, ice packs may be applied to your face for 24-48 hours. You can reduce swelling by keeping your head elevated for the first week following surgery and by walking as soon as possible after surgery.    FOLLOWING SURGERY  It is common to experience minor bleeding both from the mouth following surgery.  This generally stops at 24 to 48 hours but may continue for 7-10 days after surgery. Both your lips are likely to be numb from the surgery and may stay that way for a few months. Most patients recover all sensation to these areas but about 10-20% of patients will have some permanent lip numbness following surgery.     NASAL STUFFINESS AND SORE THROAT  Nasal stuffiness and a sore throat can occur, from the breathing tube placed during surgery by the anesthesiologist. Your sore throat should improve about 3-5 days following surgery. If your throat is still bothering you more than a week after surgery please make us aware of this.     CLEAR LIQUIDS  It will be important that you drink a sufficient volume of fluids following surgery. An average adult requires 2 to 3 quarts of fluid every 24 hours.  While this may seem like a large quantity, it can be best achieved with constant sipping.  Most patients drink directly from a cup or glass using a straw.    SPEECH  The ease with which you can communicate and be understood is not predictable immediately after surgery.  Your speech will improve, however, by repeated attempts on your part to talk and be understood.  It is important that you slow your rate of speech, concentrate on each word, and be willing to try.  Most patients can be understood within 24 hours after surgery but speech may take time to adapt to new jaw position.    POSTOPERATIVE PAIN  Pain must be anticipated.  In most instances, however, it is moderate and treated with medications. Every patient experiences pain differently and there is no  one size fits all approach. Your surgical team will attempt to tailor your medications to best control your pain. Generally, no injections are needed for pain and a liquid or pill form medication is all that is required.    MEDICATIONS  During the period of hospitalizations, you will usually be given antibiotics, ointment to keep your lips moist, and a pain medication if needed.  Most often these will be discontinued on discharge from the hospital, except for some of the medications, which will be used for 3 days to 2 weeks following surgery. An additional course of oral antibiotics may be prescribed. The typical regiment of medications include pain medication, antibiotics. If you feel that you need additional medications please inform your surgical team. Below is a list of medications and the common usages    Common medications:  Hydrocodone/Oxycodone - Narcotic pain control  Acetominophen/Ibuprofen - Non-narcotic pain control  Amoxicillin - Antibiotic, typically a 5-7 day course  Sudafed/Claritin - Decongestant, used to keep sinuses clear after upper jaw surgery  Colace/Senna - Stool softener used while taking Narcotic pain medication      DIET  Following surgery, a strict soft, non-chew diet is required for at least 4-6 weeks (sometimes longer) to help facilitate healing of the bone at the fracture site. Your diet may consist of benderized meals or smoothies, but can also include things like well-cooked vegetables and well-cooked pasta for example that do not require chewing. Commercial dietary supplements such as Ensure   may also be of assistance during the postoperative period; the dietician can suggest those that are recommended.  These can be purchased, without a prescription, in your local pharmacy.  Most have a variety of flavors.     WEIGHT LOSS  Again, a weight loss of 10 to 20 pounds may be anticipated during the postoperative period.  This is usually a reflection of a loss of appetite, rather than the  "fact that the teeth are \"wired\" together.  Within 2 weeks following surgery, your appetite should be sufficiently improved to maintain and possibly increase your weight.    DAY OF DISCHARGE  You will be encouraged to resume your normal activities as soon as possible. Your discharge from the hospital will be dictated by four things. One, your ability to walk independently. Two, your ability to maintain adequate fluid intake to prevent dehydration. Three, your ability and urinate without difficulty and four, that your pain is controlled with medications taken by mouth. After your discharge you will be provided the contact information of the on-call Oral & Maxillofacial Surgery resident. This person is available for questions should any arise after discharge but please read through this entire document prior to calling. The answer may be in the document.     AFTER HOURS CONTACT FOR EMERGENCIES:  Please call the [facility] switchboard at [phone] and ask to have the Oral and Maxillofacial Surgery Resident On-call paged.     It is our desire to make your recovery as smooth as possible. These instructions are given to assist you following your surgery. If you have any questions please call the clinic at [phone].    "

## 2024-09-26 NOTE — PLAN OF CARE
"Goal Outcome Evaluation:     Plan of Care Reviewed With: patient     Overall Patient Progress: improving    Shift: [id number]    VS: /81   Pulse 83   Temp 99.1  F (37.3  C) (Oral)   Resp 16   Ht 1.6 m (5' 2.99\")   Wt 52.9 kg (116 lb 11.2 oz)   SpO2 95%   BMI 20.68 kg/m       Patient A&Ox4. On RA. Denies sob/chest pain. C/o right mouth and left ear pain. Pain managed with PRN Dilaudid, Tylenol, and Ibuprofen. Independent in the room. No acute concerns this shift. Call light within reach; able to make needs known. Plan of care ongoing     "

## 2024-09-27 ENCOUNTER — APPOINTMENT (OUTPATIENT)
Dept: CT IMAGING | Facility: CLINIC | Age: 24
DRG: 141 | End: 2024-09-27
Payer: COMMERCIAL

## 2024-09-27 ENCOUNTER — APPOINTMENT (OUTPATIENT)
Dept: ULTRASOUND IMAGING | Facility: CLINIC | Age: 24
DRG: 141 | End: 2024-09-27
Attending: INTERNAL MEDICINE
Payer: COMMERCIAL

## 2024-09-27 ENCOUNTER — ANESTHESIA EVENT (OUTPATIENT)
Dept: SURGERY | Facility: CLINIC | Age: 24
DRG: 141 | End: 2024-09-27
Payer: COMMERCIAL

## 2024-09-27 DIAGNOSIS — K12.2 SUBMANDIBULAR ABSCESS: Primary | ICD-10-CM

## 2024-09-27 LAB
ANION GAP SERPL CALCULATED.3IONS-SCNC: 9 MMOL/L (ref 7–15)
BUN SERPL-MCNC: 14.3 MG/DL (ref 6–20)
CALCIUM SERPL-MCNC: 9 MG/DL (ref 8.8–10.4)
CHLORIDE SERPL-SCNC: 100 MMOL/L (ref 98–107)
CREAT SERPL-MCNC: 0.64 MG/DL (ref 0.51–0.95)
CRP SERPL-MCNC: 16.32 MG/L
EGFRCR SERPLBLD CKD-EPI 2021: >90 ML/MIN/1.73M2
ERYTHROCYTE [DISTWIDTH] IN BLOOD BY AUTOMATED COUNT: 13.4 % (ref 10–15)
GLUCOSE BLDC GLUCOMTR-MCNC: 106 MG/DL (ref 70–99)
GLUCOSE BLDC GLUCOMTR-MCNC: 95 MG/DL (ref 70–99)
GLUCOSE SERPL-MCNC: 86 MG/DL (ref 70–99)
HCO3 SERPL-SCNC: 28 MMOL/L (ref 22–29)
HCT VFR BLD AUTO: 34.7 % (ref 35–47)
HGB BLD-MCNC: 11.4 G/DL (ref 11.7–15.7)
MCH RBC QN AUTO: 28.4 PG (ref 26.5–33)
MCHC RBC AUTO-ENTMCNC: 32.9 G/DL (ref 31.5–36.5)
MCV RBC AUTO: 87 FL (ref 78–100)
PLATELET # BLD AUTO: 551 10E3/UL (ref 150–450)
POTASSIUM SERPL-SCNC: 4.4 MMOL/L (ref 3.4–5.3)
RBC # BLD AUTO: 4.01 10E6/UL (ref 3.8–5.2)
SODIUM SERPL-SCNC: 137 MMOL/L (ref 135–145)
WBC # BLD AUTO: 6.5 10E3/UL (ref 4–11)

## 2024-09-27 PROCEDURE — 250N000013 HC RX MED GY IP 250 OP 250 PS 637: Performed by: STUDENT IN AN ORGANIZED HEALTH CARE EDUCATION/TRAINING PROGRAM

## 2024-09-27 PROCEDURE — 99233 SBSQ HOSP IP/OBS HIGH 50: CPT | Performed by: INTERNAL MEDICINE

## 2024-09-27 PROCEDURE — 250N000013 HC RX MED GY IP 250 OP 250 PS 637

## 2024-09-27 PROCEDURE — 250N000013 HC RX MED GY IP 250 OP 250 PS 637: Performed by: INTERNAL MEDICINE

## 2024-09-27 PROCEDURE — 76536 US EXAM OF HEAD AND NECK: CPT | Mod: 26 | Performed by: RADIOLOGY

## 2024-09-27 PROCEDURE — 250N000011 HC RX IP 250 OP 636

## 2024-09-27 PROCEDURE — 86140 C-REACTIVE PROTEIN: CPT | Performed by: INTERNAL MEDICINE

## 2024-09-27 PROCEDURE — 250N000011 HC RX IP 250 OP 636: Performed by: EMERGENCY MEDICINE

## 2024-09-27 PROCEDURE — 70491 CT SOFT TISSUE NECK W/DYE: CPT

## 2024-09-27 PROCEDURE — 76536 US EXAM OF HEAD AND NECK: CPT

## 2024-09-27 PROCEDURE — 250N000009 HC RX 250

## 2024-09-27 PROCEDURE — 36415 COLL VENOUS BLD VENIPUNCTURE: CPT

## 2024-09-27 PROCEDURE — 120N000002 HC R&B MED SURG/OB UMMC

## 2024-09-27 PROCEDURE — 80048 BASIC METABOLIC PNL TOTAL CA: CPT

## 2024-09-27 PROCEDURE — 85014 HEMATOCRIT: CPT

## 2024-09-27 PROCEDURE — 70491 CT SOFT TISSUE NECK W/DYE: CPT | Mod: 26 | Performed by: RADIOLOGY

## 2024-09-27 RX ORDER — IOPAMIDOL 755 MG/ML
100 INJECTION, SOLUTION INTRAVASCULAR ONCE
Status: COMPLETED | OUTPATIENT
Start: 2024-09-27 | End: 2024-09-27

## 2024-09-27 RX ADMIN — OXYCODONE HYDROCHLORIDE 5 MG: 5 TABLET ORAL at 21:38

## 2024-09-27 RX ADMIN — AMPICILLIN SODIUM AND SULBACTAM SODIUM 3 G: 2; 1 INJECTION, POWDER, FOR SOLUTION INTRAMUSCULAR; INTRAVENOUS at 19:58

## 2024-09-27 RX ADMIN — LORATADINE 10 MG: 10 TABLET ORAL at 08:23

## 2024-09-27 RX ADMIN — MIRTAZAPINE 15 MG: 15 TABLET, ORALLY DISINTEGRATING ORAL at 21:35

## 2024-09-27 RX ADMIN — IOPAMIDOL 90 ML: 755 INJECTION, SOLUTION INTRAVENOUS at 18:31

## 2024-09-27 RX ADMIN — ACETAMINOPHEN 650 MG: 325 TABLET, FILM COATED ORAL at 08:23

## 2024-09-27 RX ADMIN — HYDROMORPHONE HYDROCHLORIDE 0.2 MG: 0.2 INJECTION, SOLUTION INTRAMUSCULAR; INTRAVENOUS; SUBCUTANEOUS at 20:02

## 2024-09-27 RX ADMIN — OXYCODONE HYDROCHLORIDE 5 MG: 5 TABLET ORAL at 00:10

## 2024-09-27 RX ADMIN — CHLORHEXIDINE GLUCONATE, 0.12% ORAL RINSE 15 ML: 1.2 SOLUTION DENTAL at 08:23

## 2024-09-27 RX ADMIN — AMPICILLIN SODIUM AND SULBACTAM SODIUM 3 G: 2; 1 INJECTION, POWDER, FOR SOLUTION INTRAMUSCULAR; INTRAVENOUS at 12:58

## 2024-09-27 RX ADMIN — HYDROMORPHONE HYDROCHLORIDE 0.2 MG: 0.2 INJECTION, SOLUTION INTRAMUSCULAR; INTRAVENOUS; SUBCUTANEOUS at 16:52

## 2024-09-27 RX ADMIN — Medication 1 CAPSULE: at 08:23

## 2024-09-27 RX ADMIN — AMPICILLIN SODIUM AND SULBACTAM SODIUM 3 G: 2; 1 INJECTION, POWDER, FOR SOLUTION INTRAMUSCULAR; INTRAVENOUS at 05:59

## 2024-09-27 RX ADMIN — SODIUM CHLORIDE 50 ML: 9 INJECTION, SOLUTION INTRAVENOUS at 18:30

## 2024-09-27 RX ADMIN — CHLORHEXIDINE GLUCONATE, 0.12% ORAL RINSE 15 ML: 1.2 SOLUTION DENTAL at 19:57

## 2024-09-27 RX ADMIN — Medication 1 CAPSULE: at 19:57

## 2024-09-27 RX ADMIN — HYDROMORPHONE HYDROCHLORIDE 0.2 MG: 0.2 INJECTION, SOLUTION INTRAMUSCULAR; INTRAVENOUS; SUBCUTANEOUS at 10:39

## 2024-09-27 RX ADMIN — AMPICILLIN SODIUM AND SULBACTAM SODIUM 3 G: 2; 1 INJECTION, POWDER, FOR SOLUTION INTRAMUSCULAR; INTRAVENOUS at 00:02

## 2024-09-27 RX ADMIN — HYDROMORPHONE HYDROCHLORIDE 0.2 MG: 0.2 INJECTION, SOLUTION INTRAMUSCULAR; INTRAVENOUS; SUBCUTANEOUS at 06:25

## 2024-09-27 RX ADMIN — IBUPROFEN 600 MG: 600 TABLET, FILM COATED ORAL at 08:23

## 2024-09-27 ASSESSMENT — ACTIVITIES OF DAILY LIVING (ADL)
ADLS_ACUITY_SCORE: 20

## 2024-09-27 NOTE — PROGRESS NOTES
"Shift: 2300 - 0700    VS: Blood pressure (P) 114/71, pulse (P) 78, temperature (P) 98.8  F (37.1  C), temperature source (P) Oral, resp. rate (P) 16, height 1.6 m (5' 2.99\"), weight 52.9 kg (116 lb 11.2 oz), SpO2 (P) 97%.     Pain:  Pt rates their pain a 8/10.   Neuro: Alert and oriented x4.   Resp: WDL  Diet: Mechanical/Dental soft diet.   Skin: WDL  LDA: PIV anterior distal right lower forearm.   Activity: Independent   Additional Info/shift updates: Pt slept all shift. No concerns at this time.   Call light within reach     Plan of care ongoing   "

## 2024-09-27 NOTE — PLAN OF CARE
[id number]    Goal Outcome Evaluation:      Plan of Care Reviewed With: patient    Overall Patient Progress: no changeOverall Patient Progress: no change    Patient is A&O x4. Call light within reach. Able to make needs known. Independent in the room. Voids spontaneously to the bathroom. LBM: 09/26.     RA. Mechanical/Dental soft diet. PIV on R lower forearm, SL. L facial pain radiating to her L ear managed with IV dilaudid. Denies SOB, chest pain, n/v and n/t. L face and upper lip swelling improved.     RN managed K. AM lab draws placed.      Continue with POC.

## 2024-09-27 NOTE — PLAN OF CARE
"Goal Outcome Evaluation:     Plan of Care Reviewed With: patient     Overall Patient Progress: improving    Shift: 9790-0129    VS: /80 (BP Location: Left arm)   Pulse 86   Temp 98.8  F (37.1  C) (Oral)   Resp 16   Ht 1.6 m (5' 2.99\")   Wt 52.9 kg (116 lb 11.2 oz)   SpO2 97%   BMI 20.68 kg/m       Patient A&Ox4. On RA. Denies sob/chest pain. C/o right mouth. Pain managed with PRN Dilaudid, Tylenol, and Ibuprofen. Independent in the room. NPO midnight tonight; OMFS team plans to see patient tomorrow AM. No acute concerns this shift. Call light within reach; able to make needs known. Plan of care ongoing   "

## 2024-09-27 NOTE — PROGRESS NOTES
ORAL & MAXILLOFACIALSURGERY   INPATIENT PROGRESS NOTE    Dunia Corley   : 2000   Sex: female   MRN: [id number]                  2024 5:40 AM    ASSESSMENT:  Dunia Corley is a 24 year old y.o. female s/p ORIF of left mandible, I&D of left facial abscess and extraction of teeth #16,17 in OR on 24. Patient is following expected post op course. Denies any other constitutional symptoms at this time.    PLAN:  - No additional surgical interventions planned from OMFS perspective at this time  - OMFS will continue to follow while inpatient  - Continue IV abx while inpatient, unasyn 3g Q6h usually provides adequate coverage for oral polymicrobial infections, ID recs for 6 weeks of po Augmentin 875 mg on discharge  - Cultures results: staph aureus and eikenella corrodens  - HOB>30  - Warm packs to face, NO ICE  - Encouraged continued ambulation   - Peridex rinses BID  - Resume normal oral hygiene  - Ok for soft diet from OMFS perspective  - Pain management per primary; appreciate cares- continue Tylenol 500mg q4h and Ibuprofen 600mg q4h to regimen  - Patient is stable from OMFS standpoint for discharge at this time. She will follow up in our clinic one week from discharge. Address listed below. Our  will call patient and schedule accordingly    Oral and Maxillofacial Surgery (OMFS) Clinic  [facility] [employer] [facility]  [facility] - 7th floor  [address] [place] [id number]  Clinic phone number: [phone]  Clinic fax number: [phone]     Planned dispo is for this . OMFS would like to re-evaluate prior to discharge to assess swelling with concern for possible increase.     Pt evaluated with OMS Chief Resident, Dr. Wilson, who will discuss the plan with Dr. Miner, attending.      Brook Mckeon DDS  Oral and Maxillofacial Surgery Intern  __________________________________________________________  SUBJECTIVE:  Patient found in sleeping  comfortably in bed. Patient reports doing well. Patient reports current pain is about the same at ~7-8/10. She reports that her pain is worst when she wakes up. Pain is controlled but she is experiencing new ear pain on the left side that radiates down into her angle of mandible area. Tolerating diet. Denies nausea or vomiting, chest pain or shortness of breath, fever or chills. Is ambulating w/o problems.  .  OBJECTIVE:  VITALS:  Patient Vitals for the past 24 hrs:   BP Temp Temp src Pulse Resp SpO2   09/26/24 2312 (P) 114/71 (P) 98.8  F (37.1  C) (P) Oral (P) 78 (P) 16 (P) 97 %   09/26/24 1544 108/81 99.1  F (37.3  C) Oral 83 16 95 %   09/26/24 1012 -- 97.5  F (36.4  C) Axillary -- -- --   09/26/24 0949 111/75 99.7  F (37.6  C) Oral 58 16 94 %         Exam:  Neuro: alert, oriented, NAD   HEENT: head normocephalic, atraumatic, edema consistent with postoperative findings on left side, TTP along inferior border of left mandible that is still indurated. During palpation pain radiates up to left ear. Increase noted in EO edema adjacent to the left mandible. No purulence appreciated from EO incision site or adjacent to L ear.   Oral: edema as expected, hemostatic intact intraoral incisions, No purulence appreciated from IO incision site. Sutures C/I, Hardware intact, SIMEON ~25mm. FOM NT/ND. Extraction sites are hemostatic. Sutures intraorally intact with no concerns for food entrapment or significant swelling present. No intraoral purulence appreciated at this time.  Skin: no rashes or lesions. Dressing/Sutures c/d/i.   CV: no JVD appreciated, RRR   Pulm: b/l = chest rise, breathing easily   Abd: non distended, non tender, soft   Ext: warm, well-perfused    Meds:  Current Facility-Administered Medications   Medication Dose Route Frequency Provider Last Rate Last Admin    acetaminophen (TYLENOL) tablet 650 mg  650 mg Oral Q4H PRN Madai Briseno PA-C   650 mg at 09/26/24 1710    ampicillin-sulbactam (UNASYN) 3 g vial  to attach to  mL bag  3 g Intravenous Q6H Maribell Johnson  mL/hr at 09/27/24 0002 3 g at 09/27/24 0002    carboxymethylcellulose PF (REFRESH PLUS) 0.5 % ophthalmic solution 1 drop  1 drop Right Eye Q1H PRN Russell Herrera MD   1 drop at 09/22/24 2126    chlorhexidine (PERIDEX) 0.12 % solution 15 mL  15 mL Swish & Spit BID Russell Herrera MD   15 mL at 09/26/24 2211    HYDROmorphone (DILAUDID) injection 0.2 mg  0.2 mg Intravenous Q3H PRN Madai Briseno PA-C   0.2 mg at 09/26/24 2212    ibuprofen (ADVIL/MOTRIN) tablet 600 mg  600 mg Oral Q6H PRN Benson Novak DDS   600 mg at 09/26/24 1758    lactobacillus rhamnosus (GG) (CULTURELL) capsule 1 capsule  1 capsule Oral BID Marcelle Mars MD   1 capsule at 09/26/24 2208    lidocaine (LMX4) cream   Topical Q1H PRN Madai Briseno PA-C        lidocaine 1 % 0.1-1 mL  0.1-1 mL Other Q1H PRN Madai Briseno PA-C        loratadine (CLARITIN) tablet 10 mg  10 mg Oral Daily Marcelle Mars MD   10 mg at 09/26/24 1232    mirtazapine (REMERON SOL-TAB) ODT tab 15 mg  15 mg Orally disintegrating tablet At Bedtime Russell Herrera MD   15 mg at 09/26/24 2208    naloxone (NARCAN) injection 0.2 mg  0.2 mg Intravenous Q2 Min PRN Russell Herrera MD        Or    naloxone (NARCAN) injection 0.4 mg  0.4 mg Intravenous Q2 Min PRN Russell Herrera MD        Or    naloxone (NARCAN) injection 0.2 mg  0.2 mg Intramuscular Q2 Min PRN Russell Herrera MD        Or    naloxone (NARCAN) injection 0.4 mg  0.4 mg Intramuscular Q2 Min PRN Russell Herrera MD        oxyCODONE (ROXICODONE) tablet 5 mg  5 mg Oral Q4H PRN Madai Briseno PA-C   5 mg at 09/27/24 0010    sodium chloride (PF) 0.9% PF flush 3 mL  3 mL Intracatheter Q8H Madai Briseno PA-C   3 mL at 09/26/24 2213    sodium chloride (PF) 0.9% PF flush 3 mL  3 mL Intracatheter q1 min prn Madai Briseno PA-C   3 mL at 09/24/24 1547      Labs:    Admission on 09/21/2024   Component Date Value Ref Range Status    Potassium  09/21/2024 3.0 (L)  3.4 - 5.3 mmol/L Final    INR 09/21/2024 1.10  0.85 - 1.15 Final    aPTT 09/21/2024 36  22 - 38 Seconds Final    Protein Total 09/21/2024 6.3 (L)  6.4 - 8.3 g/dL Final    Albumin 09/21/2024 3.3 (L)  3.5 - 5.2 g/dL Final    Bilirubin Total 09/21/2024 0.3  <=1.2 mg/dL Final    Alkaline Phosphatase 09/21/2024 91  40 - 150 U/L Final    AST 09/21/2024 12  0 - 45 U/L Final    ALT 09/21/2024 8  0 - 50 U/L Final    Bilirubin Direct 09/21/2024 <0.20  0.00 - 0.30 mg/dL Final    Acetaminophen 09/21/2024 <5.0 (L)  10.0 - 30.0 ug/mL Final    Culture 09/21/2024 No growth after 4 days   Preliminary    Potassium 09/21/2024 4.6  3.4 - 5.3 mmol/L Final    Sodium 09/22/2024 137  135 - 145 mmol/L Final    Potassium 09/22/2024 4.1  3.4 - 5.3 mmol/L Final    Chloride 09/22/2024 102  98 - 107 mmol/L Final    Carbon Dioxide (CO2) 09/22/2024 25  22 - 29 mmol/L Final    Anion Gap 09/22/2024 10  7 - 15 mmol/L Final    Urea Nitrogen 09/22/2024 7.9  6.0 - 20.0 mg/dL Final    Creatinine 09/22/2024 0.42 (L)  0.51 - 0.95 mg/dL Final    GFR Estimate 09/22/2024 >90  >60 mL/min/1.73m2 Final    eGFR calculated using 2021 CKD-EPI equation.    Calcium 09/22/2024 8.7 (L)  8.8 - 10.4 mg/dL Final    Reference intervals for this test were updated on 7/16/2024 to reflect our healthy population more accurately. There may be differences in the flagging of prior results with similar values performed with this method. Those prior results can be interpreted in the context of the updated reference intervals.    Glucose 09/22/2024 149 (H)  70 - 99 mg/dL Final    WBC Count 09/22/2024 12.7 (H)  4.0 - 11.0 10e3/uL Final    RBC Count 09/22/2024 2.06 (L)  3.80 - 5.20 10e6/uL Final    Hemoglobin 09/22/2024 5.9 (LL)  11.7 - 15.7 g/dL Final    Hematocrit 09/22/2024 17.6 (L)  35.0 - 47.0 % Final    MCV 09/22/2024 85  78 - 100 fL Final    MCH 09/22/2024 28.6  26.5 - 33.0 pg Final    MCHC 09/22/2024 33.5  31.5 - 36.5 g/dL Final    RDW 09/22/2024 13.8  10.0  - 15.0 % Final    Platelet Count 09/22/2024 617 (H)  150 - 450 10e3/uL Final    WBC Count 09/22/2024 10.8  4.0 - 11.0 10e3/uL Final    RBC Count 09/22/2024 1.89 (L)  3.80 - 5.20 10e6/uL Final    Hemoglobin 09/22/2024 5.5 (LL)  11.7 - 15.7 g/dL Final    Hematocrit 09/22/2024 15.8 (L)  35.0 - 47.0 % Final    MCV 09/22/2024 84  78 - 100 fL Final    MCH 09/22/2024 29.1  26.5 - 33.0 pg Final    MCHC 09/22/2024 34.8  31.5 - 36.5 g/dL Final    RDW 09/22/2024 14.1  10.0 - 15.0 % Final    Platelet Count 09/22/2024 600 (H)  150 - 450 10e3/uL Final    ABO/RH(D) 09/22/2024 O POS   Final    Antibody Screen 09/22/2024 Negative  Negative Final    SPECIMEN EXPIRATION DATE 09/22/2024 20240925235900   Final    Culture 09/22/2024 1+ Fusobacterium nucleatum (A)   Final    Susceptibilities not routinely done, refer to antibiogram to view typical susceptibility profiles    Culture 09/22/2024 2+ Mixed Aerobic and Anaerobic stewart   Final    GS Culture 09/22/2024 See corresponding culture for results   Final    Gram Stain Result 09/22/2024 2+ Gram positive cocci (A)   Final    Gram Stain Result 09/22/2024 1+ Gram negative bacilli (A)   Final    Gram Stain Result 09/22/2024 4+ WBC seen (A)   Final    Predominantly PMNs    Culture 09/22/2024 2+ Normal stewart   Preliminary    Culture 09/22/2024 1+ Eikenella corrodens (A)   Preliminary    Culture 09/22/2024 1+ Staphylococcus aureus (A)   Preliminary    Ferritin 09/22/2024 133  6 - 175 ng/mL Final    Iron 09/22/2024 17 (L)  37 - 145 ug/dL Final    Iron Binding Capacity 09/22/2024 271  240 - 430 ug/dL Final    Iron Sat Index 09/22/2024 6 (L)  15 - 46 % Final    Lactate Dehydrogenase 09/22/2024 145  0 - 250 U/L Final    % Reticulocyte 09/22/2024 0.8  0.5 - 2.0 % Final    Absolute Reticulocyte 09/22/2024 0.015 (L)  0.025 - 0.095 10e6/uL Final    Vitamin B12 09/22/2024 1,464 (H)  232 - 1,245 pg/mL Final    Protein Total 09/22/2024 6.7  6.4 - 8.3 g/dL Final    Albumin 09/22/2024 3.6  3.5 - 5.2 g/dL  Final    Bilirubin Total 09/22/2024 0.2  <=1.2 mg/dL Final    Alkaline Phosphatase 09/22/2024 83  40 - 150 U/L Final    AST 09/22/2024 10  0 - 45 U/L Final    ALT 09/22/2024 7  0 - 50 U/L Final    Bilirubin Direct 09/22/2024 <0.20  0.00 - 0.30 mg/dL Final    Haptoglobin 09/22/2024 347 (H)  30 - 200 mg/dL Final    Blood Component Type 09/22/2024 Red Blood Cells   Preliminary    Product Code 09/22/2024 [id number]   Preliminary    Unit Status 09/22/2024 Returned   Preliminary    Unit Number 09/22/2024 [id number]   Preliminary    CROSSMATCH 09/22/2024 Compatible   Preliminary    CODING SYSTEM 09/22/2024 [id number]   Preliminary    ISSUE DATE AND TIME 09/22/2024 [id number]   Preliminary    UNIT ABO/RH 09/22/2024 O+   Preliminary    UNIT TYPE ISBT 09/22/2024 5100   Preliminary    Blood Component Type 09/22/2024 Red Blood Cells   Preliminary    Product Code 09/22/2024 [id number]   Preliminary    Unit Status 09/22/2024 Returned   Preliminary    Unit Number 09/22/2024 [id number]   Preliminary    CROSSMATCH 09/22/2024 Compatible   Preliminary    CODING SYSTEM 09/22/2024 [id number]   Preliminary    ISSUE DATE AND TIME 09/22/2024 [id number]   Preliminary    UNIT ABO/RH 09/22/2024 O+   Preliminary    UNIT TYPE ISBT 09/22/2024 5100   Preliminary    pH Venous POCT 09/22/2024 7.46 (H)  7.32 - 7.43 Final    CRITICAL RESULTS DOCUMENTED BY CATH STAFF AND MD    pCO2 Venous POCT 09/22/2024 36 (L)  40 - 50 mm Hg Final    CRITICAL RESULTS DOCUMENTED BY CATH STAFF AND MD    pO2 Venous POCT 09/22/2024 217 (H)  25 - 47 mm Hg Final    CRITICAL RESULTS DOCUMENTED BY CATH STAFF AND MD    Bicarbonate Venous POCT 09/22/2024 26  21 - 28 mmol/L Final    CRITICAL RESULTS DOCUMENTED BY CATH STAFF AND MD    Sodium POCT 09/22/2024 139  135 - 145 mmol/L Final    CRITICAL RESULTS DOCUMENTED BY CATH STAFF AND MD    Potassium POCT 09/22/2024 4.5  3.4 - 5.3 mmol/L Final    CRITICAL RESULTS DOCUMENTED BY CATH STAFF AND MD    Hemoglobin POCT 09/22/2024  9.7 (L)  11.7 - 15.7 g/dL Final    CRITICAL RESULTS DOCUMENTED BY CATH STAFF AND MD    Oxyhemoglobin Venous POCT 09/22/2024 99 (H)  70 - 75 % Final    CRITICAL RESULTS DOCUMENTED BY CATH STAFF AND MD    Glucose Whole Blood POCT 09/22/2024 160 (H)  70 - 99 mg/dL Final    CRITICAL RESULTS DOCUMENTED BY CATH STAFF AND MD    Base Excess/Deficit (+/-) POCT 09/22/2024 1.9  -3.0 - 3.0 mmol/L Final    CRITICAL RESULTS DOCUMENTED BY CATH STAFF AND MD    Calcium, Ionized Whole Blood POCT 09/22/2024 4.6  4.4 - 5.2 mg/dL Final    CRITICAL RESULTS DOCUMENTED BY CATH STAFF AND MD    O2 Sat, Venous POCT 09/22/2024 100 (H)  70 - 75 % Final    CRITICAL RESULTS DOCUMENTED BY CATH STAFF AND MD    Lactic Acid POCT 09/22/2024 0.9  0.7 - 2.0 mmol/L Final    CRITICAL RESULTS DOCUMENTED BY CATH STAFF AND MD    FIO2 POCT 09/22/2024 90.0  % Final    Hemoglobin 09/22/2024 10.3 (L)  11.7 - 15.7 g/dL Final    Sodium 09/23/2024 141  135 - 145 mmol/L Final    Potassium 09/23/2024 3.6  3.4 - 5.3 mmol/L Final    Chloride 09/23/2024 103  98 - 107 mmol/L Final    Carbon Dioxide (CO2) 09/23/2024 28  22 - 29 mmol/L Final    Anion Gap 09/23/2024 10  7 - 15 mmol/L Final    Urea Nitrogen 09/23/2024 10.7  6.0 - 20.0 mg/dL Final    Creatinine 09/23/2024 0.59  0.51 - 0.95 mg/dL Final    GFR Estimate 09/23/2024 >90  >60 mL/min/1.73m2 Final    eGFR calculated using 2021 CKD-EPI equation.    Calcium 09/23/2024 8.7 (L)  8.8 - 10.4 mg/dL Final    Reference intervals for this test were updated on 7/16/2024 to reflect our healthy population more accurately. There may be differences in the flagging of prior results with similar values performed with this method. Those prior results can be interpreted in the context of the updated reference intervals.    Glucose 09/23/2024 111 (H)  70 - 99 mg/dL Final    WBC Count 09/23/2024 17.8 (H)  4.0 - 11.0 10e3/uL Final    RBC Count 09/23/2024 3.58 (L)  3.80 - 5.20 10e6/uL Final    Hemoglobin 09/23/2024 10.2 (L)  11.7 - 15.7  g/dL Final    Hematocrit 09/23/2024 31.0 (L)  35.0 - 47.0 % Final    MCV 09/23/2024 87  78 - 100 fL Final    MCH 09/23/2024 28.5  26.5 - 33.0 pg Final    MCHC 09/23/2024 32.9  31.5 - 36.5 g/dL Final    RDW 09/23/2024 13.9  10.0 - 15.0 % Final    Platelet Count 09/23/2024 554 (H)  150 - 450 10e3/uL Final    Folic Acid 09/23/2024 3.6 (L)  4.6 - 34.8 ng/mL Final    CRP Inflammation 09/23/2024 95.67 (H)  <5.00 mg/L Final    Sodium 09/24/2024 141  135 - 145 mmol/L Final    Potassium 09/24/2024 3.6  3.4 - 5.3 mmol/L Final    Chloride 09/24/2024 102  98 - 107 mmol/L Final    Carbon Dioxide (CO2) 09/24/2024 30 (H)  22 - 29 mmol/L Final    Anion Gap 09/24/2024 9  7 - 15 mmol/L Final    Urea Nitrogen 09/24/2024 7.2  6.0 - 20.0 mg/dL Final    Creatinine 09/24/2024 0.58  0.51 - 0.95 mg/dL Final    GFR Estimate 09/24/2024 >90  >60 mL/min/1.73m2 Final    eGFR calculated using 2021 CKD-EPI equation.    Calcium 09/24/2024 8.9  8.8 - 10.4 mg/dL Final    Reference intervals for this test were updated on 7/16/2024 to reflect our healthy population more accurately. There may be differences in the flagging of prior results with similar values performed with this method. Those prior results can be interpreted in the context of the updated reference intervals.    Glucose 09/24/2024 93  70 - 99 mg/dL Final    WBC Count 09/24/2024 7.8  4.0 - 11.0 10e3/uL Final    RBC Count 09/24/2024 3.96  3.80 - 5.20 10e6/uL Final    Hemoglobin 09/24/2024 11.1 (L)  11.7 - 15.7 g/dL Final    Hematocrit 09/24/2024 34.4 (L)  35.0 - 47.0 % Final    MCV 09/24/2024 87  78 - 100 fL Final    MCH 09/24/2024 28.0  26.5 - 33.0 pg Final    MCHC 09/24/2024 32.3  31.5 - 36.5 g/dL Final    RDW 09/24/2024 13.9  10.0 - 15.0 % Final    Platelet Count 09/24/2024 538 (H)  150 - 450 10e3/uL Final    Estimated Average Glucose 09/24/2024 111  <117 mg/dL Final    Hemoglobin A1C 09/24/2024 5.5  <5.7 % Final    Normal <5.7%   Prediabetes 5.7-6.4%    Diabetes 6.5% or higher      Note: Adopted from ADA consensus guidelines.    GLUCOSE BY METER POCT 09/24/2024 107 (H)  70 - 99 mg/dL Final    Dr/RN Notified    Sodium 09/25/2024 137  135 - 145 mmol/L Final    Potassium 09/25/2024 4.0  3.4 - 5.3 mmol/L Final    Chloride 09/25/2024 100  98 - 107 mmol/L Final    Carbon Dioxide (CO2) 09/25/2024 29  22 - 29 mmol/L Final    Anion Gap 09/25/2024 8  7 - 15 mmol/L Final    Urea Nitrogen 09/25/2024 12.1  6.0 - 20.0 mg/dL Final    Creatinine 09/25/2024 0.58  0.51 - 0.95 mg/dL Final    GFR Estimate 09/25/2024 >90  >60 mL/min/1.73m2 Final    eGFR calculated using 2021 CKD-EPI equation.    Calcium 09/25/2024 9.0  8.8 - 10.4 mg/dL Final    Reference intervals for this test were updated on 7/16/2024 to reflect our healthy population more accurately. There may be differences in the flagging of prior results with similar values performed with this method. Those prior results can be interpreted in the context of the updated reference intervals.    Glucose 09/25/2024 93  70 - 99 mg/dL Final    CRP Inflammation 09/25/2024 30.71 (H)  <5.00 mg/L Final    WBC Count 09/25/2024 6.6  4.0 - 11.0 10e3/uL Final    RBC Count 09/25/2024 3.56 (L)  3.80 - 5.20 10e6/uL Final    Hemoglobin 09/25/2024 10.2 (L)  11.7 - 15.7 g/dL Final    Hematocrit 09/25/2024 30.9 (L)  35.0 - 47.0 % Final    MCV 09/25/2024 87  78 - 100 fL Final    MCH 09/25/2024 28.7  26.5 - 33.0 pg Final    MCHC 09/25/2024 33.0  31.5 - 36.5 g/dL Final    RDW 09/25/2024 13.4  10.0 - 15.0 % Final    Platelet Count 09/25/2024 502 (H)  150 - 450 10e3/uL Final    % Neutrophils 09/25/2024 44  % Final    % Lymphocytes 09/25/2024 41  % Final    % Monocytes 09/25/2024 9  % Final    % Eosinophils 09/25/2024 1  % Final    % Basophils 09/25/2024 1  % Final    % Immature Granulocytes 09/25/2024 4  % Final    NRBCs per 100 WBC 09/25/2024 0  <1 /100 Final    Absolute Neutrophils 09/25/2024 2.9  1.6 - 8.3 10e3/uL Final    Absolute Lymphocytes 09/25/2024 2.7  0.8 - 5.3 10e3/uL  Final    Absolute Monocytes 09/25/2024 0.6  0.0 - 1.3 10e3/uL Final    Absolute Eosinophils 09/25/2024 0.1  0.0 - 0.7 10e3/uL Final    Absolute Basophils 09/25/2024 0.1  0.0 - 0.2 10e3/uL Final    Absolute Immature Granulocytes 09/25/2024 0.3  <=0.4 10e3/uL Final    Absolute NRBCs 09/25/2024 0.0  10e3/uL Final    GLUCOSE BY METER POCT 09/25/2024 98  70 - 99 mg/dL Final    GLUCOSE BY METER POCT 09/25/2024 116 (H)  70 - 99 mg/dL Final    Dr/RN Notified       Imaging:    Results for orders placed or performed during the hospital encounter of 09/20/24   Soft tissue neck CT w contrast    Narrative    EXAM: CT SOFT TISSUE NECK W CONTRAST  LOCATION: [facility]  DATE: 9/21/2024    INDICATION: 4 days of left sided facial swelling going down towards her left jaw and neck, likely from dental abscess.  COMPARISON: None.  CONTRAST: ISOVUE 370 80ML  TECHNIQUE: Routine CT Soft Tissue Neck with IV contrast. Multiplanar reformats. Dose reduction techniques were used.    FINDINGS:     MUCOSAL SPACES/SOFT TISSUES: Nondisplaced fracture left mandibular angle involves the to the socket for the third mandibular molar. Multiloculated abscess in the asymmetrically enlarged left masseter muscle and extending posterior to the mandibular angle   measures 4.6 cm AP by 2.5 cm transverse by 3.5 cm craniocaudal. There is probably also an early ill-defined abscess involving the asymmetrically enlarged left medial pterygoid muscle. There is significant surrounding soft tissue swelling and   subcutaneous edema in the left buccal region which extends into the left submandibular space. There is also edema extending into the medially along the anterior margin of the sternocleidomastoid muscle into the parapharyngeal space. Normal mucosal spaces   of the upper aerodigestive tract. No mucosal mass or inflammation identified. Normal vocal cords and infraglottic trachea. Normal oral cavity,  spaces, and floor of mouth  structures. Dental caries involving the left third maxillary molar.    LYMPH NODES: Prominent reactive left submandibular lymph nodes measure up to 0.9 cm in short axis.     SALIVARY GLANDS: Normal parotid and submandibular glands.    THYROID: Normal.     VESSELS: Vascular structures of the neck are patent.    VISUALIZED INTRACRANIAL/ORBITS/SINUSES: No abnormality of the visualized intracranial compartment. Small old left medial orbital wall blowout fracture. Otherwise orbits unremarkable. Visualized paranasal sinuses and mastoid air cells are clear.    OTHER: No destructive osseous lesion. The included lung apices are clear.      Impression    IMPRESSION:   1.  Large multiloculated abscess involving the left masseter muscle and extending posterior to the left mandibular angle with probable early abscess involving the left medial pterygoid muscle. Surrounding cellulitis and reactive edema as described above.  2.  Abscess is likely due to spread of oral bacteria through the nondisplaced left mandibular angle fracture which involves the tooth socket for the third mandibular molar.    3.  The above findings were discussed directly with Dr. Rae at 2:40 AM CST on 09/21/2024.

## 2024-09-27 NOTE — PROGRESS NOTES
Brief Cross Cover Note    Contacted by OMFS who placed ordered for STAT CT Soft Tissue Neck w/ Contrast to evaluate persistent swelling. OMFS tentatively planning to bring her to OR for washout, plan to be confirmed once CT resulted. Order placed for NPO after MN.    Sowmya Erazo PA-C  Hospitalist Service     ADDENDUM: CT with postsurgical changes of plate and screw fixation of nondisplaced left mandibular angle fracture and posterior left mandibular molar extraction, decreased size of the multiloculated abscess about the left mandibular angle with decreased associated edema. No acute or worsening pathology in the neck. Multiple reactive left cervical lymph nodes.    Paged OMFS to confirm OR plans, but did not hear back before end of shift. Will keep NPO after MN in case.
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
## 2024-09-28 ENCOUNTER — ANESTHESIA (OUTPATIENT)
Dept: SURGERY | Facility: CLINIC | Age: 24
DRG: 141 | End: 2024-09-28
Payer: COMMERCIAL

## 2024-09-28 LAB
ANION GAP SERPL CALCULATED.3IONS-SCNC: 12 MMOL/L (ref 7–15)
BUN SERPL-MCNC: 13 MG/DL (ref 6–20)
CALCIUM SERPL-MCNC: 9.6 MG/DL (ref 8.8–10.4)
CHLORIDE SERPL-SCNC: 100 MMOL/L (ref 98–107)
CREAT SERPL-MCNC: 0.67 MG/DL (ref 0.51–0.95)
EGFRCR SERPLBLD CKD-EPI 2021: >90 ML/MIN/1.73M2
ERYTHROCYTE [DISTWIDTH] IN BLOOD BY AUTOMATED COUNT: 13.7 % (ref 10–15)
GLUCOSE BLDC GLUCOMTR-MCNC: 109 MG/DL (ref 70–99)
GLUCOSE SERPL-MCNC: 87 MG/DL (ref 70–99)
HCO3 SERPL-SCNC: 26 MMOL/L (ref 22–29)
HCT VFR BLD AUTO: 34.7 % (ref 35–47)
HGB BLD-MCNC: 11.2 G/DL (ref 11.7–15.7)
MCH RBC QN AUTO: 28.2 PG (ref 26.5–33)
MCHC RBC AUTO-ENTMCNC: 32.3 G/DL (ref 31.5–36.5)
MCV RBC AUTO: 87 FL (ref 78–100)
PLATELET # BLD AUTO: 511 10E3/UL (ref 150–450)
POTASSIUM SERPL-SCNC: 4.8 MMOL/L (ref 3.4–5.3)
RBC # BLD AUTO: 3.97 10E6/UL (ref 3.8–5.2)
SODIUM SERPL-SCNC: 138 MMOL/L (ref 135–145)
WBC # BLD AUTO: 6.3 10E3/UL (ref 4–11)

## 2024-09-28 PROCEDURE — 370N000017 HC ANESTHESIA TECHNICAL FEE, PER MIN: Performed by: DENTIST

## 2024-09-28 PROCEDURE — 250N000013 HC RX MED GY IP 250 OP 250 PS 637: Performed by: ANESTHESIOLOGY

## 2024-09-28 PROCEDURE — 250N000013 HC RX MED GY IP 250 OP 250 PS 637

## 2024-09-28 PROCEDURE — 80048 BASIC METABOLIC PNL TOTAL CA: CPT

## 2024-09-28 PROCEDURE — 87102 FUNGUS ISOLATION CULTURE: CPT | Performed by: DENTIST

## 2024-09-28 PROCEDURE — 250N000011 HC RX IP 250 OP 636: Performed by: NURSE ANESTHETIST, CERTIFIED REGISTERED

## 2024-09-28 PROCEDURE — 710N000009 HC RECOVERY PHASE 1, LEVEL 1, PER MIN: Performed by: DENTIST

## 2024-09-28 PROCEDURE — 250N000025 HC SEVOFLURANE, PER MIN: Performed by: DENTIST

## 2024-09-28 PROCEDURE — 120N000002 HC R&B MED SURG/OB UMMC

## 2024-09-28 PROCEDURE — 87075 CULTR BACTERIA EXCEPT BLOOD: CPT | Performed by: DENTIST

## 2024-09-28 PROCEDURE — 250N000011 HC RX IP 250 OP 636: Performed by: ANESTHESIOLOGY

## 2024-09-28 PROCEDURE — 250N000013 HC RX MED GY IP 250 OP 250 PS 637: Performed by: DENTIST

## 2024-09-28 PROCEDURE — 258N000003 HC RX IP 258 OP 636: Performed by: NURSE ANESTHETIST, CERTIFIED REGISTERED

## 2024-09-28 PROCEDURE — 87070 CULTURE OTHR SPECIMN AEROBIC: CPT | Performed by: DENTIST

## 2024-09-28 PROCEDURE — 85027 COMPLETE CBC AUTOMATED: CPT

## 2024-09-28 PROCEDURE — 999N000141 HC STATISTIC PRE-PROCEDURE NURSING ASSESSMENT: Performed by: DENTIST

## 2024-09-28 PROCEDURE — 250N000013 HC RX MED GY IP 250 OP 250 PS 637: Performed by: INTERNAL MEDICINE

## 2024-09-28 PROCEDURE — 250N000011 HC RX IP 250 OP 636

## 2024-09-28 PROCEDURE — 250N000013 HC RX MED GY IP 250 OP 250 PS 637: Performed by: STUDENT IN AN ORGANIZED HEALTH CARE EDUCATION/TRAINING PROGRAM

## 2024-09-28 PROCEDURE — 250N000009 HC RX 250: Performed by: DENTIST

## 2024-09-28 PROCEDURE — 99232 SBSQ HOSP IP/OBS MODERATE 35: CPT | Performed by: INTERNAL MEDICINE

## 2024-09-28 PROCEDURE — 250N000009 HC RX 250: Performed by: NURSE ANESTHETIST, CERTIFIED REGISTERED

## 2024-09-28 PROCEDURE — 11044 DBRDMT BONE 1ST 20 SQ CM/<: CPT | Performed by: ANESTHESIOLOGY

## 2024-09-28 PROCEDURE — 11044 DBRDMT BONE 1ST 20 SQ CM/<: CPT | Performed by: NURSE ANESTHETIST, CERTIFIED REGISTERED

## 2024-09-28 PROCEDURE — 36415 COLL VENOUS BLD VENIPUNCTURE: CPT

## 2024-09-28 PROCEDURE — 272N000001 HC OR GENERAL SUPPLY STERILE: Performed by: DENTIST

## 2024-09-28 PROCEDURE — 87206 SMEAR FLUORESCENT/ACID STAI: CPT | Performed by: DENTIST

## 2024-09-28 PROCEDURE — 360N000075 HC SURGERY LEVEL 2, PER MIN: Performed by: DENTIST

## 2024-09-28 PROCEDURE — 250N000011 HC RX IP 250 OP 636: Performed by: EMERGENCY MEDICINE

## 2024-09-28 RX ORDER — OXYCODONE HYDROCHLORIDE 10 MG/1
10 TABLET ORAL
Status: DISCONTINUED | OUTPATIENT
Start: 2024-09-28 | End: 2024-09-28 | Stop reason: HOSPADM

## 2024-09-28 RX ORDER — ONDANSETRON 2 MG/ML
INJECTION INTRAMUSCULAR; INTRAVENOUS PRN
Status: DISCONTINUED | OUTPATIENT
Start: 2024-09-28 | End: 2024-09-28

## 2024-09-28 RX ORDER — ONDANSETRON 2 MG/ML
4 INJECTION INTRAMUSCULAR; INTRAVENOUS EVERY 30 MIN PRN
Status: DISCONTINUED | OUTPATIENT
Start: 2024-09-28 | End: 2024-09-28 | Stop reason: HOSPADM

## 2024-09-28 RX ORDER — OXYCODONE HYDROCHLORIDE 5 MG/1
5 TABLET ORAL
Status: COMPLETED | OUTPATIENT
Start: 2024-09-28 | End: 2024-09-28

## 2024-09-28 RX ORDER — NALOXONE HYDROCHLORIDE 0.4 MG/ML
0.1 INJECTION, SOLUTION INTRAMUSCULAR; INTRAVENOUS; SUBCUTANEOUS
Status: DISCONTINUED | OUTPATIENT
Start: 2024-09-28 | End: 2024-09-28 | Stop reason: HOSPADM

## 2024-09-28 RX ORDER — ONDANSETRON 4 MG/1
4 TABLET, ORALLY DISINTEGRATING ORAL EVERY 30 MIN PRN
Status: DISCONTINUED | OUTPATIENT
Start: 2024-09-28 | End: 2024-09-28 | Stop reason: HOSPADM

## 2024-09-28 RX ORDER — FENTANYL CITRATE 50 UG/ML
INJECTION, SOLUTION INTRAMUSCULAR; INTRAVENOUS PRN
Status: DISCONTINUED | OUTPATIENT
Start: 2024-09-28 | End: 2024-09-28

## 2024-09-28 RX ORDER — DEXMEDETOMIDINE HYDROCHLORIDE 4 UG/ML
INJECTION, SOLUTION INTRAVENOUS PRN
Status: DISCONTINUED | OUTPATIENT
Start: 2024-09-28 | End: 2024-09-28

## 2024-09-28 RX ORDER — HYDROMORPHONE HYDROCHLORIDE 1 MG/ML
0.4 INJECTION, SOLUTION INTRAMUSCULAR; INTRAVENOUS; SUBCUTANEOUS EVERY 5 MIN PRN
Status: DISCONTINUED | OUTPATIENT
Start: 2024-09-28 | End: 2024-09-28 | Stop reason: HOSPADM

## 2024-09-28 RX ORDER — DEXAMETHASONE SODIUM PHOSPHATE 4 MG/ML
4 INJECTION, SOLUTION INTRA-ARTICULAR; INTRALESIONAL; INTRAMUSCULAR; INTRAVENOUS; SOFT TISSUE
Status: DISCONTINUED | OUTPATIENT
Start: 2024-09-28 | End: 2024-09-28 | Stop reason: HOSPADM

## 2024-09-28 RX ORDER — PROPOFOL 10 MG/ML
INJECTION, EMULSION INTRAVENOUS PRN
Status: DISCONTINUED | OUTPATIENT
Start: 2024-09-28 | End: 2024-09-28

## 2024-09-28 RX ORDER — HYDROMORPHONE HYDROCHLORIDE 1 MG/ML
0.2 INJECTION, SOLUTION INTRAMUSCULAR; INTRAVENOUS; SUBCUTANEOUS EVERY 5 MIN PRN
Status: DISCONTINUED | OUTPATIENT
Start: 2024-09-28 | End: 2024-09-28 | Stop reason: HOSPADM

## 2024-09-28 RX ORDER — FENTANYL CITRATE 50 UG/ML
25 INJECTION, SOLUTION INTRAMUSCULAR; INTRAVENOUS EVERY 5 MIN PRN
Status: DISCONTINUED | OUTPATIENT
Start: 2024-09-28 | End: 2024-09-28 | Stop reason: HOSPADM

## 2024-09-28 RX ORDER — SODIUM CHLORIDE, SODIUM LACTATE, POTASSIUM CHLORIDE, CALCIUM CHLORIDE 600; 310; 30; 20 MG/100ML; MG/100ML; MG/100ML; MG/100ML
INJECTION, SOLUTION INTRAVENOUS CONTINUOUS PRN
Status: DISCONTINUED | OUTPATIENT
Start: 2024-09-28 | End: 2024-09-28

## 2024-09-28 RX ORDER — LIDOCAINE HYDROCHLORIDE 20 MG/ML
INJECTION, SOLUTION INFILTRATION; PERINEURAL PRN
Status: DISCONTINUED | OUTPATIENT
Start: 2024-09-28 | End: 2024-09-28

## 2024-09-28 RX ORDER — CHLORHEXIDINE GLUCONATE ORAL RINSE 1.2 MG/ML
SOLUTION DENTAL PRN
Status: DISCONTINUED | OUTPATIENT
Start: 2024-09-28 | End: 2024-09-28 | Stop reason: HOSPADM

## 2024-09-28 RX ORDER — FENTANYL CITRATE 50 UG/ML
50 INJECTION, SOLUTION INTRAMUSCULAR; INTRAVENOUS EVERY 5 MIN PRN
Status: DISCONTINUED | OUTPATIENT
Start: 2024-09-28 | End: 2024-09-28 | Stop reason: HOSPADM

## 2024-09-28 RX ORDER — KETAMINE HYDROCHLORIDE 10 MG/ML
INJECTION INTRAMUSCULAR; INTRAVENOUS PRN
Status: DISCONTINUED | OUTPATIENT
Start: 2024-09-28 | End: 2024-09-28

## 2024-09-28 RX ORDER — MAGNESIUM HYDROXIDE 1200 MG/15ML
LIQUID ORAL PRN
Status: DISCONTINUED | OUTPATIENT
Start: 2024-09-28 | End: 2024-09-28 | Stop reason: HOSPADM

## 2024-09-28 RX ORDER — CHLORHEXIDINE GLUCONATE ORAL RINSE 1.2 MG/ML
10 SOLUTION DENTAL ONCE
Status: COMPLETED | OUTPATIENT
Start: 2024-09-28 | End: 2024-09-28

## 2024-09-28 RX ORDER — DEXAMETHASONE SODIUM PHOSPHATE 4 MG/ML
INJECTION, SOLUTION INTRA-ARTICULAR; INTRALESIONAL; INTRAMUSCULAR; INTRAVENOUS; SOFT TISSUE PRN
Status: DISCONTINUED | OUTPATIENT
Start: 2024-09-28 | End: 2024-09-28

## 2024-09-28 RX ORDER — ACETAMINOPHEN 325 MG/1
975 TABLET ORAL ONCE
Status: COMPLETED | OUTPATIENT
Start: 2024-09-28 | End: 2024-09-28

## 2024-09-28 RX ORDER — SODIUM CHLORIDE, SODIUM LACTATE, POTASSIUM CHLORIDE, CALCIUM CHLORIDE 600; 310; 30; 20 MG/100ML; MG/100ML; MG/100ML; MG/100ML
INJECTION, SOLUTION INTRAVENOUS CONTINUOUS
Status: DISCONTINUED | OUTPATIENT
Start: 2024-09-28 | End: 2024-09-28 | Stop reason: HOSPADM

## 2024-09-28 RX ADMIN — OXYCODONE HYDROCHLORIDE 5 MG: 5 TABLET ORAL at 17:40

## 2024-09-28 RX ADMIN — ONDANSETRON 4 MG: 2 INJECTION INTRAMUSCULAR; INTRAVENOUS at 11:15

## 2024-09-28 RX ADMIN — Medication 1 CAPSULE: at 19:35

## 2024-09-28 RX ADMIN — HYDROMORPHONE HYDROCHLORIDE 0.2 MG: 0.2 INJECTION, SOLUTION INTRAMUSCULAR; INTRAVENOUS; SUBCUTANEOUS at 16:06

## 2024-09-28 RX ADMIN — FENTANYL CITRATE 50 MCG: 50 INJECTION INTRAMUSCULAR; INTRAVENOUS at 11:50

## 2024-09-28 RX ADMIN — HYDROMORPHONE HYDROCHLORIDE 0.2 MG: 0.2 INJECTION, SOLUTION INTRAMUSCULAR; INTRAVENOUS; SUBCUTANEOUS at 05:19

## 2024-09-28 RX ADMIN — OXYCODONE HYDROCHLORIDE 5 MG: 5 TABLET ORAL at 06:29

## 2024-09-28 RX ADMIN — HYDROMORPHONE HYDROCHLORIDE 0.2 MG: 1 INJECTION, SOLUTION INTRAMUSCULAR; INTRAVENOUS; SUBCUTANEOUS at 12:27

## 2024-09-28 RX ADMIN — ACETAMINOPHEN 650 MG: 325 TABLET, FILM COATED ORAL at 12:54

## 2024-09-28 RX ADMIN — CHLORHEXIDINE GLUCONATE, 0.12% ORAL RINSE 15 ML: 1.2 SOLUTION DENTAL at 19:35

## 2024-09-28 RX ADMIN — Medication 20 MG: at 10:42

## 2024-09-28 RX ADMIN — PROPOFOL 150 MG: 10 INJECTION, EMULSION INTRAVENOUS at 10:38

## 2024-09-28 RX ADMIN — CHLORHEXIDINE GLUCONATE 0.12% ORAL RINSE 10 ML: 1.2 LIQUID ORAL at 09:59

## 2024-09-28 RX ADMIN — Medication 35 MG: at 10:38

## 2024-09-28 RX ADMIN — AMPICILLIN SODIUM AND SULBACTAM SODIUM 3 G: 2; 1 INJECTION, POWDER, FOR SOLUTION INTRAMUSCULAR; INTRAVENOUS at 07:51

## 2024-09-28 RX ADMIN — HYDROMORPHONE HYDROCHLORIDE 0.2 MG: 0.2 INJECTION, SOLUTION INTRAMUSCULAR; INTRAVENOUS; SUBCUTANEOUS at 19:35

## 2024-09-28 RX ADMIN — HYDROMORPHONE HYDROCHLORIDE 0.4 MG: 1 INJECTION, SOLUTION INTRAMUSCULAR; INTRAVENOUS; SUBCUTANEOUS at 12:49

## 2024-09-28 RX ADMIN — HYDROMORPHONE HYDROCHLORIDE 0.2 MG: 0.2 INJECTION, SOLUTION INTRAMUSCULAR; INTRAVENOUS; SUBCUTANEOUS at 22:36

## 2024-09-28 RX ADMIN — SODIUM CHLORIDE, POTASSIUM CHLORIDE, SODIUM LACTATE AND CALCIUM CHLORIDE: 600; 310; 30; 20 INJECTION, SOLUTION INTRAVENOUS at 10:28

## 2024-09-28 RX ADMIN — AMPICILLIN SODIUM AND SULBACTAM SODIUM 3 G: 2; 1 INJECTION, POWDER, FOR SOLUTION INTRAMUSCULAR; INTRAVENOUS at 01:59

## 2024-09-28 RX ADMIN — PHENYLEPHRINE HYDROCHLORIDE 100 MCG: 10 INJECTION INTRAVENOUS at 10:44

## 2024-09-28 RX ADMIN — HYDROMORPHONE HYDROCHLORIDE 0.2 MG: 0.2 INJECTION, SOLUTION INTRAMUSCULAR; INTRAVENOUS; SUBCUTANEOUS at 02:00

## 2024-09-28 RX ADMIN — LIDOCAINE HYDROCHLORIDE 50 MG: 20 INJECTION, SOLUTION INFILTRATION; PERINEURAL at 10:38

## 2024-09-28 RX ADMIN — SUGAMMADEX 200 MG: 100 INJECTION, SOLUTION INTRAVENOUS at 11:18

## 2024-09-28 RX ADMIN — FENTANYL CITRATE 50 MCG: 50 INJECTION INTRAMUSCULAR; INTRAVENOUS at 10:37

## 2024-09-28 RX ADMIN — DEXAMETHASONE SODIUM PHOSPHATE 6 MG: 4 INJECTION, SOLUTION INTRAMUSCULAR; INTRAVENOUS at 10:38

## 2024-09-28 RX ADMIN — AMPICILLIN SODIUM AND SULBACTAM SODIUM 3 G: 2; 1 INJECTION, POWDER, FOR SOLUTION INTRAMUSCULAR; INTRAVENOUS at 19:35

## 2024-09-28 RX ADMIN — HYDROMORPHONE HYDROCHLORIDE 0.2 MG: 1 INJECTION, SOLUTION INTRAMUSCULAR; INTRAVENOUS; SUBCUTANEOUS at 12:13

## 2024-09-28 RX ADMIN — IBUPROFEN 600 MG: 600 TABLET, FILM COATED ORAL at 14:34

## 2024-09-28 RX ADMIN — Medication 8 MCG: at 10:28

## 2024-09-28 RX ADMIN — FENTANYL CITRATE 50 MCG: 50 INJECTION INTRAMUSCULAR; INTRAVENOUS at 12:01

## 2024-09-28 RX ADMIN — PHENYLEPHRINE HYDROCHLORIDE 100 MCG: 10 INJECTION INTRAVENOUS at 10:52

## 2024-09-28 RX ADMIN — HYDROMORPHONE HYDROCHLORIDE 0.2 MG: 0.2 INJECTION, SOLUTION INTRAMUSCULAR; INTRAVENOUS; SUBCUTANEOUS at 08:47

## 2024-09-28 RX ADMIN — Medication 4 MCG: at 10:44

## 2024-09-28 RX ADMIN — FENTANYL CITRATE 50 MCG: 50 INJECTION INTRAMUSCULAR; INTRAVENOUS at 11:38

## 2024-09-28 RX ADMIN — OXYCODONE HYDROCHLORIDE 5 MG: 5 TABLET ORAL at 13:08

## 2024-09-28 RX ADMIN — MIRTAZAPINE 15 MG: 15 TABLET, ORALLY DISINTEGRATING ORAL at 22:36

## 2024-09-28 RX ADMIN — ACETAMINOPHEN 650 MG: 325 TABLET, FILM COATED ORAL at 08:04

## 2024-09-28 ASSESSMENT — ACTIVITIES OF DAILY LIVING (ADL)
ADLS_ACUITY_SCORE: 20

## 2024-09-28 NOTE — PROGRESS NOTES
ORAL & MAXILLOFACIALSURGERY   INPATIENT PROGRESS NOTE    Dunia Corley   : 2000   Sex: female   MRN: [id number]                      Attending Attestation:  Pt seen and examined by me, I agree with the resident's note. Pt has significant swelling at the left lower face that is firm. Imaging shows fluid is present. She will need exploration and irrigation of the surgical site in the OR under GA.     ASSESSMENT:  Dunia Corley is a 24 year old y.o. female s/p ORIF of left mandible, I&D of left facial abscess and extraction of teeth #16,17 in OR on 24. Patient is with persistent postoperative edema and pain, post-op CT scan on 24 shows new collection of fluids.     PLAN:  - wash-out, irrigation and debridement of left mandible in the OR today 24   - NPO starting midnight   - Heat compression to face, AVOID ICE   - HOB  > 30   - Continue Unasyn 3g q6h     Pt evaluated with [facility] Senior Resident, Dr. Amy Peck DDS  Oral and Maxillofacial Surgery Intern  __________________________________________________________  SUBJECTIVE:  No acute events overnight. Continues to endorse persistent pain of 7,8/10. Reports limited mouth opening. No improvements with swelling. Reports that her left lip and chin are numb. Denies N/V/F and other constitutional symptoms.   .  OBJECTIVE:  VITALS:  Patient Vitals for the past 24 hrs:   BP Temp Temp src Pulse Resp SpO2   24 2341 124/89 99.6  F (37.6  C) Oral 94 16 98 %   24 1441 103/69 98.8  F (37.1  C) Oral 89 16 99 %   24 1039 -- 98.8  F (37.1  C) Oral -- -- --   24 0800 123/80 100.1  F (37.8  C) Oral 86 16 97 %         Exam:    Neuro: alert, oriented, NAD   HEENT: head normocephalic, atraumatic, edema consistent with postoperative findings on left side, TTP along inferior border of left mandible that is still indurated. During palpation pain radiates up to left ear. Increase noted in EO edema adjacent to the left  mandible. No purulence appreciated from EO incision site or adjacent to L ear.   Oral: edema as expected, hemostatic intact intraoral incisions, No purulence appreciated from IO incision site. Sutures C/I, Hardware intact, SIMEON ~25mm. FOM NT/ND. Extraction sites are hemostatic. Sutures intraorally intact with no concerns for food entrapment or significant swelling present. No intraoral purulence appreciated at this time.  Skin: no rashes or lesions. Dressing/Sutures c/d/i.   CV: no JVD appreciated, RRR   Pulm: b/l = chest rise, breathing easily   Abd: non distended, non tender, soft   Ext: warm, well-perfused  Neuro: CN V3 left hypoesthesia otherwise intact     Meds:  Current Facility-Administered Medications   Medication Dose Route Frequency Provider Last Rate Last Admin    acetaminophen (TYLENOL) tablet 650 mg  650 mg Oral Q4H PRN Madai Briseno PA-C   650 mg at 09/27/24 0823    ampicillin-sulbactam (UNASYN) 3 g vial to attach to  mL bag  3 g Intravenous Q6H Maribell Johnson  mL/hr at 09/28/24 0159 3 g at 09/28/24 0159    carboxymethylcellulose PF (REFRESH PLUS) 0.5 % ophthalmic solution 1 drop  1 drop Right Eye Q1H PRN Russell Herrera MD   1 drop at 09/22/24 2126    chlorhexidine (PERIDEX) 0.12 % solution 15 mL  15 mL Swish & Spit BID Russell Herrera MD   15 mL at 09/27/24 1957    HYDROmorphone (DILAUDID) injection 0.2 mg  0.2 mg Intravenous Q3H PRN Madai Briseno PA-C   0.2 mg at 09/28/24 0519    ibuprofen (ADVIL/MOTRIN) tablet 600 mg  600 mg Oral Q6H PRN Benson Novak DDS   600 mg at 09/27/24 0823    lactobacillus rhamnosus (GG) (CULTURELL) capsule 1 capsule  1 capsule Oral BID Marcelle Mars MD   1 capsule at 09/27/24 1957    lidocaine (LMX4) cream   Topical Q1H PRN Madai Briseno PA-C        lidocaine 1 % 0.1-1 mL  0.1-1 mL Other Q1H PRN Madai Briseno PA-C        loratadine (CLARITIN) tablet 10 mg  10 mg Oral Daily Marcelle Mars MD   10 mg at 09/27/24 0823    mirtazapine (REMERON  SOL-TAB) ODT tab 15 mg  15 mg Orally disintegrating tablet At Bedtime Russell Herrera MD   15 mg at 09/27/24 2135    naloxone (NARCAN) injection 0.2 mg  0.2 mg Intravenous Q2 Min PRN Russell Herrera MD        Or    naloxone (NARCAN) injection 0.4 mg  0.4 mg Intravenous Q2 Min PRN Russell Herrera MD        Or    naloxone (NARCAN) injection 0.2 mg  0.2 mg Intramuscular Q2 Min PRN Russell Herrera MD        Or    naloxone (NARCAN) injection 0.4 mg  0.4 mg Intramuscular Q2 Min PRN Russell Herrera MD        oxyCODONE (ROXICODONE) tablet 5 mg  5 mg Oral Q4H PRN Madai Briseno PA-DAGO   5 mg at 09/27/24 2138    sodium chloride (PF) 0.9% PF flush 3 mL  3 mL Intracatheter Q8H Madai Briseno PA-C   3 mL at 09/28/24 0519    sodium chloride (PF) 0.9% PF flush 3 mL  3 mL Intracatheter q1 min prn Madai Briseno PA-C   3 mL at 09/28/24 0159      Labs:    Admission on 09/21/2024   Component Date Value Ref Range Status    Potassium 09/21/2024 3.0 (L)  3.4 - 5.3 mmol/L Final    INR 09/21/2024 1.10  0.85 - 1.15 Final    aPTT 09/21/2024 36  22 - 38 Seconds Final    Protein Total 09/21/2024 6.3 (L)  6.4 - 8.3 g/dL Final    Albumin 09/21/2024 3.3 (L)  3.5 - 5.2 g/dL Final    Bilirubin Total 09/21/2024 0.3  <=1.2 mg/dL Final    Alkaline Phosphatase 09/21/2024 91  40 - 150 U/L Final    AST 09/21/2024 12  0 - 45 U/L Final    ALT 09/21/2024 8  0 - 50 U/L Final    Bilirubin Direct 09/21/2024 <0.20  0.00 - 0.30 mg/dL Final    Acetaminophen 09/21/2024 <5.0 (L)  10.0 - 30.0 ug/mL Final    Culture 09/21/2024 No growth after 4 days   Preliminary    Potassium 09/21/2024 4.6  3.4 - 5.3 mmol/L Final    Sodium 09/22/2024 137  135 - 145 mmol/L Final    Potassium 09/22/2024 4.1  3.4 - 5.3 mmol/L Final    Chloride 09/22/2024 102  98 - 107 mmol/L Final    Carbon Dioxide (CO2) 09/22/2024 25  22 - 29 mmol/L Final    Anion Gap 09/22/2024 10  7 - 15 mmol/L Final    Urea Nitrogen 09/22/2024 7.9  6.0 - 20.0 mg/dL Final    Creatinine 09/22/2024 0.42 (L)  0.51 -  0.95 mg/dL Final    GFR Estimate 09/22/2024 >90  >60 mL/min/1.73m2 Final    eGFR calculated using 2021 CKD-EPI equation.    Calcium 09/22/2024 8.7 (L)  8.8 - 10.4 mg/dL Final    Reference intervals for this test were updated on 7/16/2024 to reflect our healthy population more accurately. There may be differences in the flagging of prior results with similar values performed with this method. Those prior results can be interpreted in the context of the updated reference intervals.    Glucose 09/22/2024 149 (H)  70 - 99 mg/dL Final    WBC Count 09/22/2024 12.7 (H)  4.0 - 11.0 10e3/uL Final    RBC Count 09/22/2024 2.06 (L)  3.80 - 5.20 10e6/uL Final    Hemoglobin 09/22/2024 5.9 (LL)  11.7 - 15.7 g/dL Final    Hematocrit 09/22/2024 17.6 (L)  35.0 - 47.0 % Final    MCV 09/22/2024 85  78 - 100 fL Final    MCH 09/22/2024 28.6  26.5 - 33.0 pg Final    MCHC 09/22/2024 33.5  31.5 - 36.5 g/dL Final    RDW 09/22/2024 13.8  10.0 - 15.0 % Final    Platelet Count 09/22/2024 617 (H)  150 - 450 10e3/uL Final    WBC Count 09/22/2024 10.8  4.0 - 11.0 10e3/uL Final    RBC Count 09/22/2024 1.89 (L)  3.80 - 5.20 10e6/uL Final    Hemoglobin 09/22/2024 5.5 (LL)  11.7 - 15.7 g/dL Final    Hematocrit 09/22/2024 15.8 (L)  35.0 - 47.0 % Final    MCV 09/22/2024 84  78 - 100 fL Final    MCH 09/22/2024 29.1  26.5 - 33.0 pg Final    MCHC 09/22/2024 34.8  31.5 - 36.5 g/dL Final    RDW 09/22/2024 14.1  10.0 - 15.0 % Final    Platelet Count 09/22/2024 600 (H)  150 - 450 10e3/uL Final    ABO/RH(D) 09/22/2024 O POS   Final    Antibody Screen 09/22/2024 Negative  Negative Final    SPECIMEN EXPIRATION DATE 09/22/2024 [id number]   Final    Culture 09/22/2024 1+ Fusobacterium nucleatum (A)   Final    Susceptibilities not routinely done, refer to antibiogram to view typical susceptibility profiles    Culture 09/22/2024 2+ Mixed Aerobic and Anaerobic stewart   Final    GS Culture 09/22/2024 See corresponding culture for results   Final    Gram Stain Result  09/22/2024 2+ Gram positive cocci (A)   Final    Gram Stain Result 09/22/2024 1+ Gram negative bacilli (A)   Final    Gram Stain Result 09/22/2024 4+ WBC seen (A)   Final    Predominantly PMNs    Culture 09/22/2024 2+ Normal stewart   Preliminary    Culture 09/22/2024 1+ Eikenella corrodens (A)   Preliminary    Culture 09/22/2024 1+ Staphylococcus aureus (A)   Preliminary    Ferritin 09/22/2024 133  6 - 175 ng/mL Final    Iron 09/22/2024 17 (L)  37 - 145 ug/dL Final    Iron Binding Capacity 09/22/2024 271  240 - 430 ug/dL Final    Iron Sat Index 09/22/2024 6 (L)  15 - 46 % Final    Lactate Dehydrogenase 09/22/2024 145  0 - 250 U/L Final    % Reticulocyte 09/22/2024 0.8  0.5 - 2.0 % Final    Absolute Reticulocyte 09/22/2024 0.015 (L)  0.025 - 0.095 10e6/uL Final    Vitamin B12 09/22/2024 1,464 (H)  232 - 1,245 pg/mL Final    Protein Total 09/22/2024 6.7  6.4 - 8.3 g/dL Final    Albumin 09/22/2024 3.6  3.5 - 5.2 g/dL Final    Bilirubin Total 09/22/2024 0.2  <=1.2 mg/dL Final    Alkaline Phosphatase 09/22/2024 83  40 - 150 U/L Final    AST 09/22/2024 10  0 - 45 U/L Final    ALT 09/22/2024 7  0 - 50 U/L Final    Bilirubin Direct 09/22/2024 <0.20  0.00 - 0.30 mg/dL Final    Haptoglobin 09/22/2024 347 (H)  30 - 200 mg/dL Final    Blood Component Type 09/22/2024 Red Blood Cells   Preliminary    Product Code 09/22/2024 [id number]   Preliminary    Unit Status 09/22/2024 Returned   Preliminary    Unit Number 09/22/2024 [id number]   Preliminary    CROSSMATCH 09/22/2024 Compatible   Preliminary    CODING SYSTEM 09/22/2024 [id number]   Preliminary    ISSUE DATE AND TIME 09/22/2024 [id number]   Preliminary    UNIT ABO/RH 09/22/2024 O+   Preliminary    UNIT TYPE ISBT 09/22/2024 5100   Preliminary    Blood Component Type 09/22/2024 Red Blood Cells   Preliminary    Product Code 09/22/2024 [id number]   Preliminary    Unit Status 09/22/2024 Returned   Preliminary    Unit Number 09/22/2024 [id number]   Preliminary    CROSSMATCH  09/22/2024 Compatible   Preliminary    CODING SYSTEM 09/22/2024 [id number]   Preliminary    ISSUE DATE AND TIME 09/22/2024 [id number]   Preliminary    UNIT ABO/RH 09/22/2024 O+   Preliminary    UNIT TYPE ISBT 09/22/2024 5100   Preliminary    pH Venous POCT 09/22/2024 7.46 (H)  7.32 - 7.43 Final    CRITICAL RESULTS DOCUMENTED BY CATH STAFF AND MD    pCO2 Venous POCT 09/22/2024 36 (L)  40 - 50 mm Hg Final    CRITICAL RESULTS DOCUMENTED BY CATH STAFF AND MD    pO2 Venous POCT 09/22/2024 217 (H)  25 - 47 mm Hg Final    CRITICAL RESULTS DOCUMENTED BY CATH STAFF AND MD    Bicarbonate Venous POCT 09/22/2024 26  21 - 28 mmol/L Final    CRITICAL RESULTS DOCUMENTED BY CATH STAFF AND MD    Sodium POCT 09/22/2024 139  135 - 145 mmol/L Final    CRITICAL RESULTS DOCUMENTED BY CATH STAFF AND MD    Potassium POCT 09/22/2024 4.5  3.4 - 5.3 mmol/L Final    CRITICAL RESULTS DOCUMENTED BY CATH STAFF AND MD    Hemoglobin POCT 09/22/2024 9.7 (L)  11.7 - 15.7 g/dL Final    CRITICAL RESULTS DOCUMENTED BY CATH STAFF AND MD    Oxyhemoglobin Venous POCT 09/22/2024 99 (H)  70 - 75 % Final    CRITICAL RESULTS DOCUMENTED BY CATH STAFF AND MD    Glucose Whole Blood POCT 09/22/2024 160 (H)  70 - 99 mg/dL Final    CRITICAL RESULTS DOCUMENTED BY CATH STAFF AND MD    Base Excess/Deficit (+/-) POCT 09/22/2024 1.9  -3.0 - 3.0 mmol/L Final    CRITICAL RESULTS DOCUMENTED BY CATH STAFF AND MD    Calcium, Ionized Whole Blood POCT 09/22/2024 4.6  4.4 - 5.2 mg/dL Final    CRITICAL RESULTS DOCUMENTED BY CATH STAFF AND MD    O2 Sat, Venous POCT 09/22/2024 100 (H)  70 - 75 % Final    CRITICAL RESULTS DOCUMENTED BY CATH STAFF AND MD    Lactic Acid POCT 09/22/2024 0.9  0.7 - 2.0 mmol/L Final    CRITICAL RESULTS DOCUMENTED BY CATH STAFF AND MD    FIO2 POCT 09/22/2024 90.0  % Final    Hemoglobin 09/22/2024 10.3 (L)  11.7 - 15.7 g/dL Final    Sodium 09/23/2024 141  135 - 145 mmol/L Final    Potassium 09/23/2024 3.6  3.4 - 5.3 mmol/L Final    Chloride 09/23/2024 103   98 - 107 mmol/L Final    Carbon Dioxide (CO2) 09/23/2024 28  22 - 29 mmol/L Final    Anion Gap 09/23/2024 10  7 - 15 mmol/L Final    Urea Nitrogen 09/23/2024 10.7  6.0 - 20.0 mg/dL Final    Creatinine 09/23/2024 0.59  0.51 - 0.95 mg/dL Final    GFR Estimate 09/23/2024 >90  >60 mL/min/1.73m2 Final    eGFR calculated using 2021 CKD-EPI equation.    Calcium 09/23/2024 8.7 (L)  8.8 - 10.4 mg/dL Final    Reference intervals for this test were updated on 7/16/2024 to reflect our healthy population more accurately. There may be differences in the flagging of prior results with similar values performed with this method. Those prior results can be interpreted in the context of the updated reference intervals.    Glucose 09/23/2024 111 (H)  70 - 99 mg/dL Final    WBC Count 09/23/2024 17.8 (H)  4.0 - 11.0 10e3/uL Final    RBC Count 09/23/2024 3.58 (L)  3.80 - 5.20 10e6/uL Final    Hemoglobin 09/23/2024 10.2 (L)  11.7 - 15.7 g/dL Final    Hematocrit 09/23/2024 31.0 (L)  35.0 - 47.0 % Final    MCV 09/23/2024 87  78 - 100 fL Final    MCH 09/23/2024 28.5  26.5 - 33.0 pg Final    MCHC 09/23/2024 32.9  31.5 - 36.5 g/dL Final    RDW 09/23/2024 13.9  10.0 - 15.0 % Final    Platelet Count 09/23/2024 554 (H)  150 - 450 10e3/uL Final    Folic Acid 09/23/2024 3.6 (L)  4.6 - 34.8 ng/mL Final    CRP Inflammation 09/23/2024 95.67 (H)  <5.00 mg/L Final    Sodium 09/24/2024 141  135 - 145 mmol/L Final    Potassium 09/24/2024 3.6  3.4 - 5.3 mmol/L Final    Chloride 09/24/2024 102  98 - 107 mmol/L Final    Carbon Dioxide (CO2) 09/24/2024 30 (H)  22 - 29 mmol/L Final    Anion Gap 09/24/2024 9  7 - 15 mmol/L Final    Urea Nitrogen 09/24/2024 7.2  6.0 - 20.0 mg/dL Final    Creatinine 09/24/2024 0.58  0.51 - 0.95 mg/dL Final    GFR Estimate 09/24/2024 >90  >60 mL/min/1.73m2 Final    eGFR calculated using 2021 CKD-EPI equation.    Calcium 09/24/2024 8.9  8.8 - 10.4 mg/dL Final    Reference intervals for this test were updated on 7/16/2024 to reflect  our healthy population more accurately. There may be differences in the flagging of prior results with similar values performed with this method. Those prior results can be interpreted in the context of the updated reference intervals.    Glucose 09/24/2024 93  70 - 99 mg/dL Final    WBC Count 09/24/2024 7.8  4.0 - 11.0 10e3/uL Final    RBC Count 09/24/2024 3.96  3.80 - 5.20 10e6/uL Final    Hemoglobin 09/24/2024 11.1 (L)  11.7 - 15.7 g/dL Final    Hematocrit 09/24/2024 34.4 (L)  35.0 - 47.0 % Final    MCV 09/24/2024 87  78 - 100 fL Final    MCH 09/24/2024 28.0  26.5 - 33.0 pg Final    MCHC 09/24/2024 32.3  31.5 - 36.5 g/dL Final    RDW 09/24/2024 13.9  10.0 - 15.0 % Final    Platelet Count 09/24/2024 538 (H)  150 - 450 10e3/uL Final    Estimated Average Glucose 09/24/2024 111  <117 mg/dL Final    Hemoglobin A1C 09/24/2024 5.5  <5.7 % Final    Normal <5.7%   Prediabetes 5.7-6.4%    Diabetes 6.5% or higher     Note: Adopted from ADA consensus guidelines.    GLUCOSE BY METER POCT 09/24/2024 107 (H)  70 - 99 mg/dL Final    Dr/RN Notified    Sodium 09/25/2024 137  135 - 145 mmol/L Final    Potassium 09/25/2024 4.0  3.4 - 5.3 mmol/L Final    Chloride 09/25/2024 100  98 - 107 mmol/L Final    Carbon Dioxide (CO2) 09/25/2024 29  22 - 29 mmol/L Final    Anion Gap 09/25/2024 8  7 - 15 mmol/L Final    Urea Nitrogen 09/25/2024 12.1  6.0 - 20.0 mg/dL Final    Creatinine 09/25/2024 0.58  0.51 - 0.95 mg/dL Final    GFR Estimate 09/25/2024 >90  >60 mL/min/1.73m2 Final    eGFR calculated using 2021 CKD-EPI equation.    Calcium 09/25/2024 9.0  8.8 - 10.4 mg/dL Final    Reference intervals for this test were updated on 7/16/2024 to reflect our healthy population more accurately. There may be differences in the flagging of prior results with similar values performed with this method. Those prior results can be interpreted in the context of the updated reference intervals.    Glucose 09/25/2024 93  70 - 99 mg/dL Final    CRP  Inflammation 09/25/2024 30.71 (H)  <5.00 mg/L Final    WBC Count 09/25/2024 6.6  4.0 - 11.0 10e3/uL Final    RBC Count 09/25/2024 3.56 (L)  3.80 - 5.20 10e6/uL Final    Hemoglobin 09/25/2024 10.2 (L)  11.7 - 15.7 g/dL Final    Hematocrit 09/25/2024 30.9 (L)  35.0 - 47.0 % Final    MCV 09/25/2024 87  78 - 100 fL Final    MCH 09/25/2024 28.7  26.5 - 33.0 pg Final    MCHC 09/25/2024 33.0  31.5 - 36.5 g/dL Final    RDW 09/25/2024 13.4  10.0 - 15.0 % Final    Platelet Count 09/25/2024 502 (H)  150 - 450 10e3/uL Final    % Neutrophils 09/25/2024 44  % Final    % Lymphocytes 09/25/2024 41  % Final    % Monocytes 09/25/2024 9  % Final    % Eosinophils 09/25/2024 1  % Final    % Basophils 09/25/2024 1  % Final    % Immature Granulocytes 09/25/2024 4  % Final    NRBCs per 100 WBC 09/25/2024 0  <1 /100 Final    Absolute Neutrophils 09/25/2024 2.9  1.6 - 8.3 10e3/uL Final    Absolute Lymphocytes 09/25/2024 2.7  0.8 - 5.3 10e3/uL Final    Absolute Monocytes 09/25/2024 0.6  0.0 - 1.3 10e3/uL Final    Absolute Eosinophils 09/25/2024 0.1  0.0 - 0.7 10e3/uL Final    Absolute Basophils 09/25/2024 0.1  0.0 - 0.2 10e3/uL Final    Absolute Immature Granulocytes 09/25/2024 0.3  <=0.4 10e3/uL Final    Absolute NRBCs 09/25/2024 0.0  10e3/uL Final    GLUCOSE BY METER POCT 09/25/2024 98  70 - 99 mg/dL Final    GLUCOSE BY METER POCT 09/25/2024 116 (H)  70 - 99 mg/dL Final    Dr/RN Notified       Imaging:  CT scan with contrast on 9/27/24 - independently reviewed   New collection of fluids at the left mandible

## 2024-09-28 NOTE — OR NURSING
PACU to Inpatient Nursing Handoff    Patient Dunia Corley is a 24 year old female who speaks English.   Procedure Procedure(s):  Wash-out, I&D, irrigation and debridement of left mandible   Surgeon(s) Primary: Mandi David MD  Resident - Assisting: Amy Petit MD; Jayshree Peck DDS     No Known Allergies    Isolation  [unfilled]     Past Medical History   has no past medical history on file.    Anesthesia General   Dermatome Level     Preop Meds Peridex - time given: 1106   Nerve block Not applicable   Intraop Meds dexamethasone (Decadron)  dexmedetomidine (Precedex): 12 mcg total  fentanyl (Sublimaze): 100 mcg total  ketamine (Ketalar): 20 mg given  ondansetron (Zofran): last given at 1115   Local Meds Yes   Antibiotics Not applicable     Pain Patient Currently in Pain: yes   PACU meds  fentanyl (Sublimaze): 100 mcg (total dose) last given at 1213   hydromorphone (Dilaudid): .4 mg (total dose) last given at 1227    PCA / epidural No   Capnography Respiratory Monitoring (EtCO2): 41 mmHg   Telemetry ECG Rhythm: Normal sinus rhythm   Inpatient Telemetry Monitor Ordered? No        Labs Glucose Lab Results   Component Value Date    GLC 87 09/28/2024     09/28/2024    GLC 92 07/09/2018       Hgb Lab Results   Component Value Date    HGB 11.2 09/28/2024       INR Lab Results   Component Value Date    INR 1.10 09/21/2024      PACU Imaging Not applicable     Wound/Incision Incision/Surgical Site 09/22/24 Left Mouth (Active)   Incision Assessment UT 09/28/24 1217   Dressing Other (Comment) 09/28/24 0934   Jessica-Incision Assessment UT 09/28/24 1217   Closure SILVER 09/28/24 1217   Incision Drainage Amount UTV 09/28/24 1217   Incision Care Normal saline 09/28/24 0934   Dressing Intervention Open to air / No Dressing 09/28/24 1217   Number of days: 6       Incision/Surgical Site 09/22/24 Left Cheek (Active)   Incision Assessment UTV 09/28/24 1217   Dressing Dry gauze 09/28/24 1217   Jessica-Incision Assessment  "UTV 09/28/24 1217   Closure SILVER 09/28/24 1217   Incision Drainage Amount Scant 09/28/24 1217   Drainage Description Sanguinous 09/28/24 1217   Incision Care Normal saline 09/28/24 0934   Dressing Intervention Moist drainage 09/28/24 1217   Number of days: 6      CMS        Equipment Pressure dressing/jaw bra   Other LDA       IV Access Peripheral IV 09/25/24 Anterior;Distal;Right Lower forearm (Active)   Site Assessment WDL 09/28/24 1200   Line Status Infusing 09/28/24 1200   Dressing Transparent 09/28/24 0800   Dressing Status clean;dry;intact 09/28/24 0800   Dressing Intervention New dressing  09/25/24 2038   Line Intervention Flushed 09/28/24 0200   Phlebitis Scale 0-->no symptoms 09/28/24 0800   Infiltration? no 09/28/24 0800   Number of days: 3      Blood Products Not applicable EBL  mL   Intake/Output Date 09/28/24 0700 - 09/29/24 0659   Shift 2327-6306 7919-4838 6470-8858 24 Hour Total   INTAKE   I.V. 300   300   Shift Total(mL/kg) 300(5.67)   300(5.67)   OUTPUT   Blood 5   5   Shift Total(mL/kg) 5(0.09)   5(0.09)   Weight (kg) 52.93 52.93 52.93 52.93      Drains / Zimmer Open Drain Anterior;Left Neck Penrose Drain 5/8\" (Active)   Site Description UTV 09/28/24 1200   Dressing Status Other (comment) 09/28/24 1200   Drainage Appearance Bloody/Bright Red 09/28/24 1200   Number of days: 0      Time of void PreOp Time of Void Prior to Procedure: 0600 (09/28/24 0938)    PostOp Voided (mL): 500 mL (09/21/24 2300)    Diapered? No   Bladder Scan      mL (09/26/24 1600)  tolerating sips     Vitals    B/P: 130/72  T: 98.6  F (37  C)    Temp src: Axillary  P:  Pulse: 88 (09/28/24 1230)          R: (!) 9  O2:  SpO2: 97 %    O2 Device: None (Room air) (09/28/24 1213)    Oxygen Delivery: 6 LPM (09/28/24 1139)         Family/support present grandmother   Patient belongings     Patient transported on cart   DC meds/scripts (obs/outpt) Not applicable   Inpatient Pain Meds Released? Yes       Special needs/considerations " Dr. David states pt may drink and eat soft diet, no straws   Tasks needing completion None       Karoline Damian RN

## 2024-09-28 NOTE — ANESTHESIA PREPROCEDURE EVALUATION
Anesthesia Pre-Procedure Evaluation    Patient: Dunia Corley   MRN: [id number] : 2000        Procedure : Procedure(s):  Wash-out, I&D, irrigation and debridement of left mandible and other procedures as indicated          No past medical history on file.   Past Surgical History:   Procedure Laterality Date    INCISION AND DRAINAGE NECK, COMBINED Left 2024    Procedure: Incision and drainage neck, combined;  Surgeon: Juvencio Miner DDS;  Location: [facility] OR    OPEN REDUCTION INTERNAL FIXATION MANDIBLE Left 2024    Procedure: Open reduction internal fixation Left Mandable Angle Fracture, Application of Maxillodibullar Fixation, Irrigation and debridement of facial abscess via intra and extra oral approaches, Extraction of teeth #16 and #17;  Surgeon: Juvencio Miner DDS;  Location: [facility] OR      No Known Allergies   Social History     Tobacco Use    Smoking status: Former     Types: Vaping Device    Smokeless tobacco: Not on file   Substance Use Topics    Alcohol use: Yes     Comment: Infrequently      Wt Readings from Last 1 Encounters:   24 52.9 kg (116 lb 11.2 oz)        Anesthesia Evaluation            ROS/MED HX  ENT/Pulmonary:       Neurologic:       Cardiovascular:       METS/Exercise Tolerance:     Hematologic:       Musculoskeletal:       GI/Hepatic:       Renal/Genitourinary:       Endo:       Psychiatric/Substance Use:     (+) psychiatric history anxiety and depression alcohol abuse  Recreational drug usage: Cocaine and Cannabis.    Infectious Disease:       Malignancy:       Other:            Physical Exam    Airway      Comment: Limited mouth opening, enlarged masseter muscle on the left     TM distance: > 3 FB   Neck ROM: full   Mouth opening: < 3 cm    Respiratory Devices and Support         Dental    unable to assess        Cardiovascular   cardiovascular exam normal          Pulmonary   pulmonary exam normal                OUTSIDE LABS:  CBC:   Lab Results   Component Value  Date    WBC 6.3 09/28/2024    WBC 6.5 09/27/2024    HGB 11.2 (L) 09/28/2024    HGB 11.4 (L) 09/27/2024    HCT 34.7 (L) 09/28/2024    HCT 34.7 (L) 09/27/2024     (H) 09/28/2024     (H) 09/27/2024     BMP:   Lab Results   Component Value Date     09/28/2024     09/27/2024    POTASSIUM 4.8 09/28/2024    POTASSIUM 4.4 09/27/2024    CHLORIDE 100 09/28/2024    CHLORIDE 100 09/27/2024    CO2 26 09/28/2024    CO2 28 09/27/2024    BUN 13.0 09/28/2024    BUN 14.3 09/27/2024    CR 0.67 09/28/2024    CR 0.64 09/27/2024    GLC 87 09/28/2024     (H) 09/28/2024     COAGS:   Lab Results   Component Value Date    PTT 36 09/21/2024    INR 1.10 09/21/2024     POC:   Lab Results   Component Value Date    HCGS Negative 09/21/2024     HEPATIC:   Lab Results   Component Value Date    ALBUMIN 3.6 09/22/2024    PROTTOTAL 6.7 09/22/2024    ALT 7 09/22/2024    AST 10 09/22/2024    ALKPHOS 83 09/22/2024    BILITOTAL 0.2 09/22/2024     OTHER:   Lab Results   Component Value Date    LACT 0.9 09/22/2024    A1C 5.5 09/24/2024    EUGENE 9.6 09/28/2024    TSH 1.84 07/09/2018       Anesthesia Plan    ASA Status:  2       Anesthesia Type: General.     - Airway: ETT   Induction: Intravenous.   Maintenance: Balanced.   Techniques and Equipment:     - Airway: Video-Laryngoscope       Consents    Anesthesia Plan(s) and associated risks, benefits, and realistic alternatives discussed. Questions answered and patient/representative(s) expressed understanding.     - Discussed:     - Discussed with:  Patient            Postoperative Care    Pain management: Multi-modal analgesia.   PONV prophylaxis: Ondansetron (or other 5HT-3), Dexamethasone or Solumedrol, Background Propofol Infusion     Comments:               Rosana Jenkins MD    I have reviewed the pertinent notes and labs in the chart from the past 30 days and (re)examined the patient.  Any updates or changes from those notes are reflected in this note.

## 2024-09-28 NOTE — PLAN OF CARE
"Goal Outcome Evaluation:       BP (!) 142/74 (BP Location: Right arm)   Pulse 98   Temp 99.1  F (37.3  C) (Oral)   Resp 16   Ht 1.6 m (5' 2.99\")   Wt 52.9 kg (116 lb 11.2 oz)   SpO2 98%   BMI 20.68 kg/m         No acute change on this shift. Pt remained alert and oriented x4. Stable on RA. VSS. Denies SOB or chest pain, N/V/ N/T. Voiding adequately, LBM 9/27, passing flatus. Mechanical soft diet, thin liquid, med whole. Family at bedside. Pt c/o of incision pain, rated 6-9/10, pain was managed with Oxy, IV dilaudid, Pt report mild improvement with pain meds. Visible skin is intact. Call light within reach and able to make needs known, Continue plan of care.           "

## 2024-09-28 NOTE — ANESTHESIA POSTPROCEDURE EVALUATION
Patient: Dunia Corley    Procedure: Procedure(s):  Wash-out, I&D, irrigation and debridement of left mandible       Anesthesia Type:  General    Note:  Disposition: Inpatient   Postop Pain Control: Uneventful            Sign Out: Well controlled pain   PONV: No   Neuro/Psych: Uneventful            Sign Out: Acceptable/Baseline neuro status   Airway/Respiratory: Uneventful            Sign Out: Acceptable/Baseline resp. status   CV/Hemodynamics: Uneventful            Sign Out: Acceptable CV status; No obvious hypovolemia; No obvious fluid overload   Other NRE: NONE   DID A NON-ROUTINE EVENT OCCUR? No           Last vitals:  Vitals Value Taken Time   /99 09/28/24 1315   Temp 37  C (98.6  F) 09/28/24 1225   Pulse 111 09/28/24 1300   Resp 16 09/28/24 1300   SpO2 99 % 09/28/24 1315   Vitals shown include unfiled device data.    Electronically Signed By: Rosana Jenkins MD  September 28, 2024  1:15 PM

## 2024-09-28 NOTE — PLAN OF CARE
Goal Outcome Evaluation:         Pt A&Ox4. Pt denies chest pain, SOB, N/V, N/T. PT states pain managed with Tylenol and IV dilaudid, oxy, ibprofren. Pt scheduled for procedure. Pt went down for procedure, tolerated well. Pain rated at 8/10. Pt has pressure dressing in place. No straws, mechanical soft diet. Continue POC.

## 2024-09-28 NOTE — ANESTHESIA CARE TRANSFER NOTE
Patient: Dunia Corley    Procedure: Procedure(s):  Wash-out, I&D, irrigation and debridement of left mandible       Diagnosis: Submandibular abscess [K12.2]  Diagnosis Additional Information: No value filed.    Anesthesia Type:   General     Note:    Oropharynx: oropharynx clear of all foreign objects and spontaneously breathing  Level of Consciousness: awake  Oxygen Supplementation: face mask  Level of Supplemental Oxygen (L/min / FiO2): 8  Independent Airway: airway patency satisfactory and stable  Dentition: dentition unchanged  Vital Signs Stable: post-procedure vital signs reviewed and stable  Report to RN Given: handoff report given  Patient transferred to: PACU    Handoff Report: Identifed the Patient, Identified the Reponsible Provider, Reviewed the pertinent medical history, Discussed the surgical course, Reviewed Intra-OP anesthesia mangement and issues during anesthesia, Set expectations for post-procedure period and Allowed opportunity for questions and acknowledgement of understanding      Vitals:  Vitals Value Taken Time   /81 09/28/24 1145   Temp     Pulse 84 09/28/24 1157   Resp 10 09/28/24 1157   SpO2 100 % 09/28/24 1157   Vitals shown include unfiled device data.    Electronically Signed By: JOSE ENRIQUE Lackey CRNA  September 28, 2024  11:57 AM

## 2024-09-28 NOTE — PLAN OF CARE
Goal Outcome Evaluation:    Plan of Care Reviewed With: patient    Overall Patient Progress: no change     A/O x4  Room air  Independent in room  Continent B/B  R PIV SL  RN managed potassium  Pain managed w/ PRN oxy and IV dilaudid  NPO at midnight (9/28) -going to OR in A.M. for wash out, I&D    Reported to pre-op nurse. CHG bath and VS done by NST.   Day nurse is to start antibiotics at 7:30 am prior to patient being taken to OR at 7:45 am.   Report given to oncoming nurse    Call light within reach, will continue to monitor and follow POC

## 2024-09-28 NOTE — ANESTHESIA PROCEDURE NOTES
Airway       Patient location during procedure: OR       Procedure Start/Stop Times: 9/28/2024 10:40 AM  Staff -        CRNA: Claudia Moijca APRN CRNA       Performed By: CRNA  Consent for Airway        Urgency: elective  Indications and Patient Condition       Indications for airway management: conor-procedural       Induction type:intravenous       Mask difficulty assessment: 1 - vent by mask    Final Airway Details       Final airway type: endotracheal airway       Successful airway: ETT - single and Oral  Endotracheal Airway Details        ETT size (mm): 6.0       Cuffed: yes       Cuff volume (mL): 2       Successful intubation technique: video laryngoscopy       VL Blade Size: MAC 3       Grade View of Cords: 1       Adjucts: stylet       Position: Right       Measured from: lips       Secured at (cm): 20       Bite block used: None    Post intubation assessment        Placement verified by: capnometry, equal breath sounds and chest rise        Number of attempts at approach: 1       Number of other approaches attempted: 0       Secured with: tape       Ease of procedure: easy       Dentition: Intact and Unchanged    Medication(s) Administered   Medication Administration Time: 9/28/2024 10:40 AM

## 2024-09-28 NOTE — PROGRESS NOTES
"OMFS brief post op note     S: patient was sitting in bed comfortably. Reports pain 8/10 but improved to 6/10 after ibuprofen so for her pain is under control. Denies bleeding. Denies N/V/F and other constitutional symptoms.     O:   Vitals: /79 (BP Location: Right arm)   Pulse 100   Temp 100.1  F (37.8  C) (Oral)   Resp 16   Ht 1.6 m (5' 2.99\")   Wt 52.9 kg (116 lb 11.2 oz)   SpO2 100%   BMI 20.68 kg/m       GEN: WD/WN female NAD  HEENT: NC EOMI, PERRL, left neck with drain in place secured with silk sutures, moderate bloody drainage, no signs of persistent bleeding, pressure dressing with kerlix and jaw bra in place   I/O: surgical incision is closed with chromic gut sutures no persistent bleeding, SIMEON 25 mm  CV: warm and well-perfused  PULM: breathing comfortably on room air  GI: Soft, ND/NT  MSK: GIORDANO, no peripheral extremity edema  NEURO: AAOx4, CN II-XII intact bilaterally except left V3 hypoesthesia   PSYCH: Appropriate mood and affect     A: 24 year old female s/p ORIF of left mandible, I&D of left facial abscess and extraction of teeth #16,17 in OR on 9/22/24 c/b formation of sialocele now s/p irrigation and debridement of the left mandible and placement of penrose drain on 9/28/24. Patient is progressing well with no complications      P:   - No further surgical intervention from OMFS at this time   - Keep pressure dressing on at all times  - Continue Unasyn 3g q6h   - Change fluffs when saturated, bloody or serosanguineous drainage is expected   - Okay for ice application on face   - Pain regimen includes tylenol 650 mg q6h, ibuprofen 600 mg q6h and prn oxycodone 5mg q6h   - Peridex BID   - Mechanical soft food diet   - Normal oral hygiene     Jayshree Peck DDS  OMFS intern      "
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
## 2024-09-29 VITALS
HEIGHT: 63 IN | HEART RATE: 98 BPM | OXYGEN SATURATION: 100 % | DIASTOLIC BLOOD PRESSURE: 69 MMHG | SYSTOLIC BLOOD PRESSURE: 114 MMHG | WEIGHT: 116.7 LBS | TEMPERATURE: 98.4 F | BODY MASS INDEX: 20.68 KG/M2 | RESPIRATION RATE: 16 BRPM

## 2024-09-29 LAB
ANION GAP SERPL CALCULATED.3IONS-SCNC: 8 MMOL/L (ref 7–15)
BUN SERPL-MCNC: 12 MG/DL (ref 6–20)
CALCIUM SERPL-MCNC: 9.2 MG/DL (ref 8.8–10.4)
CHLORIDE SERPL-SCNC: 103 MMOL/L (ref 98–107)
CREAT SERPL-MCNC: 0.52 MG/DL (ref 0.51–0.95)
EGFRCR SERPLBLD CKD-EPI 2021: >90 ML/MIN/1.73M2
ERYTHROCYTE [DISTWIDTH] IN BLOOD BY AUTOMATED COUNT: 13.9 % (ref 10–15)
GLUCOSE BLDC GLUCOMTR-MCNC: 102 MG/DL (ref 70–99)
GLUCOSE BLDC GLUCOMTR-MCNC: 88 MG/DL (ref 70–99)
GLUCOSE SERPL-MCNC: 152 MG/DL (ref 70–99)
HCO3 SERPL-SCNC: 26 MMOL/L (ref 22–29)
HCT VFR BLD AUTO: 30.6 % (ref 35–47)
HGB BLD-MCNC: 9.9 G/DL (ref 11.7–15.7)
MCH RBC QN AUTO: 27.9 PG (ref 26.5–33)
MCHC RBC AUTO-ENTMCNC: 32.4 G/DL (ref 31.5–36.5)
MCV RBC AUTO: 86 FL (ref 78–100)
PLATELET # BLD AUTO: 502 10E3/UL (ref 150–450)
POTASSIUM SERPL-SCNC: 3.5 MMOL/L (ref 3.4–5.3)
RBC # BLD AUTO: 3.55 10E6/UL (ref 3.8–5.2)
SODIUM SERPL-SCNC: 137 MMOL/L (ref 135–145)
WBC # BLD AUTO: 11.7 10E3/UL (ref 4–11)

## 2024-09-29 PROCEDURE — 250N000011 HC RX IP 250 OP 636

## 2024-09-29 PROCEDURE — 250N000013 HC RX MED GY IP 250 OP 250 PS 637: Performed by: INTERNAL MEDICINE

## 2024-09-29 PROCEDURE — 85014 HEMATOCRIT: CPT

## 2024-09-29 PROCEDURE — 250N000013 HC RX MED GY IP 250 OP 250 PS 637

## 2024-09-29 PROCEDURE — 36415 COLL VENOUS BLD VENIPUNCTURE: CPT

## 2024-09-29 PROCEDURE — 999N000127 HC STATISTIC PERIPHERAL IV START W US GUIDANCE

## 2024-09-29 PROCEDURE — 99239 HOSP IP/OBS DSCHRG MGMT >30: CPT | Performed by: INTERNAL MEDICINE

## 2024-09-29 PROCEDURE — 250N000011 HC RX IP 250 OP 636: Performed by: EMERGENCY MEDICINE

## 2024-09-29 PROCEDURE — 80048 BASIC METABOLIC PNL TOTAL CA: CPT

## 2024-09-29 RX ORDER — IBUPROFEN 600 MG/1
600 TABLET, FILM COATED ORAL EVERY 6 HOURS PRN
Qty: 60 TABLET | Refills: 0 | Status: SHIPPED | OUTPATIENT
Start: 2024-09-29

## 2024-09-29 RX ORDER — CHLORHEXIDINE GLUCONATE ORAL RINSE 1.2 MG/ML
15 SOLUTION DENTAL 2 TIMES DAILY
Qty: 473 ML | Refills: 0 | Status: SHIPPED | OUTPATIENT
Start: 2024-09-29

## 2024-09-29 RX ORDER — OXYCODONE HYDROCHLORIDE 5 MG/1
5 TABLET ORAL EVERY 4 HOURS PRN
Qty: 20 TABLET | Refills: 0 | Status: SHIPPED | OUTPATIENT
Start: 2024-09-29 | End: 2024-09-29

## 2024-09-29 RX ORDER — ACETAMINOPHEN 325 MG/1
650 TABLET ORAL EVERY 4 HOURS PRN
Qty: 60 TABLET | Refills: 0 | Status: SHIPPED | OUTPATIENT
Start: 2024-09-29

## 2024-09-29 RX ORDER — OXYCODONE HYDROCHLORIDE 5 MG/1
5 TABLET ORAL EVERY 4 HOURS PRN
Qty: 12 TABLET | Refills: 0 | Status: SHIPPED | OUTPATIENT
Start: 2024-09-29 | End: 2024-10-04

## 2024-09-29 RX ADMIN — Medication 1 CAPSULE: at 09:26

## 2024-09-29 RX ADMIN — AMPICILLIN SODIUM AND SULBACTAM SODIUM 3 G: 2; 1 INJECTION, POWDER, FOR SOLUTION INTRAMUSCULAR; INTRAVENOUS at 12:07

## 2024-09-29 RX ADMIN — CHLORHEXIDINE GLUCONATE, 0.12% ORAL RINSE 15 ML: 1.2 SOLUTION DENTAL at 09:26

## 2024-09-29 RX ADMIN — HYDROMORPHONE HYDROCHLORIDE 0.2 MG: 0.2 INJECTION, SOLUTION INTRAMUSCULAR; INTRAVENOUS; SUBCUTANEOUS at 06:53

## 2024-09-29 RX ADMIN — HYDROMORPHONE HYDROCHLORIDE 0.2 MG: 0.2 INJECTION, SOLUTION INTRAMUSCULAR; INTRAVENOUS; SUBCUTANEOUS at 19:07

## 2024-09-29 RX ADMIN — HYDROMORPHONE HYDROCHLORIDE 0.2 MG: 0.2 INJECTION, SOLUTION INTRAMUSCULAR; INTRAVENOUS; SUBCUTANEOUS at 12:08

## 2024-09-29 RX ADMIN — AMPICILLIN SODIUM AND SULBACTAM SODIUM 3 G: 2; 1 INJECTION, POWDER, FOR SOLUTION INTRAMUSCULAR; INTRAVENOUS at 19:03

## 2024-09-29 RX ADMIN — OXYCODONE HYDROCHLORIDE 5 MG: 5 TABLET ORAL at 09:26

## 2024-09-29 RX ADMIN — AMPICILLIN SODIUM AND SULBACTAM SODIUM 3 G: 2; 1 INJECTION, POWDER, FOR SOLUTION INTRAMUSCULAR; INTRAVENOUS at 01:21

## 2024-09-29 RX ADMIN — LORATADINE 10 MG: 10 TABLET ORAL at 09:26

## 2024-09-29 RX ADMIN — HYDROMORPHONE HYDROCHLORIDE 0.2 MG: 0.2 INJECTION, SOLUTION INTRAMUSCULAR; INTRAVENOUS; SUBCUTANEOUS at 01:21

## 2024-09-29 RX ADMIN — HYDROMORPHONE HYDROCHLORIDE 0.2 MG: 0.2 INJECTION, SOLUTION INTRAMUSCULAR; INTRAVENOUS; SUBCUTANEOUS at 15:51

## 2024-09-29 ASSESSMENT — ACTIVITIES OF DAILY LIVING (ADL)
ADLS_ACUITY_SCORE: 20

## 2024-09-29 NOTE — PLAN OF CARE
[id number] - [id number]    Problem: Adult Inpatient Plan of Care  Goal: Plan of Care Review  Description: The Plan of Care Review/Shift note should be completed every shift.  The Outcome Evaluation is a brief statement about your assessment that the patient is improving, declining, or no change.  This information will be displayed automatically on your shift  note.  Flowsheets (Taken 9/29/2024 0056)  Plan of Care Reviewed With: patient  Overall Patient Progress: no change  Goal: Absence of Hospital-Acquired Illness or Injury  Outcome: Progressing  Intervention: Identify and Manage Fall Risk  Recent Flowsheet Documentation  Taken 9/29/2024 0000 by Yanni Alberto RN  Safety Promotion/Fall Prevention:   assistive device/personal items within reach   clutter free environment maintained   nonskid shoes/slippers when out of bed   safety round/check completed  Intervention: Prevent Skin Injury  Recent Flowsheet Documentation  Taken 9/29/2024 0000 by Yanni Alberto RN  Body Position:   supine   position changed independently  Intervention: Prevent and Manage VTE (Venous Thromboembolism) Risk  Recent Flowsheet Documentation  Taken 9/29/2024 0000 by Yanni Alberto RN  VTE Prevention/Management: (pt request) SCDs off (sequential compression devices)  Goal: Optimal Comfort and Wellbeing  Outcome: Progressing  Intervention: Monitor Pain and Promote Comfort  Recent Flowsheet Documentation  Taken 9/29/2024 0000 by Yanni Alberto RN  Pain Management Interventions: medication (see MAR)     Problem: Pain Acute  Goal: Optimal Pain Control and Function  Intervention: Develop Pain Management Plan  Recent Flowsheet Documentation  Taken 9/29/2024 0000 by Yanni Alberto RN  Pain Management Interventions: medication (see MAR)  Intervention: Prevent or Manage Pain  Recent Flowsheet Documentation  Taken 9/29/2024 0000 by Yanni Alberto RN  Sensory Stimulation Regulation:   care clustered   quiet environment  promoted   lighting decreased  Medication Review/Management: medications reviewed  Intervention: Optimize Psychosocial Wellbeing  Recent Flowsheet Documentation  Taken 9/29/2024 0000 by Yanni Alberto RN  Supportive Measures: active listening utilized         O2: stable on RA.   Denies chest pain and SOB.    Output: Voids spontaneously without difficulty to bathroom    Last BM: denies abdominal discomfort.    Activity: independent   Skin: WDL except, swelling L face   Pain: Pain managed with PRN IV dilaudid   CMS: Intact, AOx4.   pt able to make needs known.   Denies numbness and tingling.   Diet: Soft & Bite Sized Diet (level 6) Thin Liquids (level 0)  diet.   Denies nausea/vomiting.    LDA: R PIV SL   Equipment: IV pole, personal belongings,    Plan: Continue with plan of care.   Call light within reach,    Additional Info:        Goal Outcome Evaluation:    Plan of Care Reviewed With: patient  Overall Patient Progress: no change

## 2024-09-29 NOTE — PLAN OF CARE
1182 - 1007    Goal Outcome Evaluation:      Plan of Care Reviewed With: patient    Overall Patient Progress: improvingOverall Patient Progress: improving    End of shift Summary: See flowsheet for VS and detail assessments.     Changes this Shift:      Pulmonary: Pt denies SOB & chest pain. Pt is on RA    Diet: Regular  Easy to chew level 7 diet, medication whole with thin liquids.    Output: Pt is voiding adequately in the bathroom independently, LBM 9/29     Activity: Pt is independent     Skin: cheek incision otherwise rest of the skin is Intact     Pain: Rates pain 7-9/10. Managed with PRN dilaudid an oxycodone     Neuro/CMS: Pt is alert and oriented x 4. Denies numbness and tingling.Able to make needs known.     Dressings/Drains: Left masseter muscle incision dressing    IV: L PIV     Additional info: this shift's Abx were late as the pt did not have an IV access till around noon. No significant changes on shift.      Plan:  pt has discharge orders but requested to get her 8 pm Abx before she can ,family (mum)was in agreement , provider notified and was okay with it.Plan of care ongoing.

## 2024-09-29 NOTE — PROGRESS NOTES
ORAL & MAXILLOFACIAL SURGERY   PROGRESS NOTE  Dunia Corley,  MRN: [id number],  : 2000           Attending Attestation:  Pt seen and examined by me. I agree with the resident's assessment and plan. Page is doing very well. Drain removed. Ok for discontinue home today with po antibiotics  Please see discharge instructions for wound care. We will see Kina back in our office later this week (will appoint for Thursday 10/3/24 at 9 am.   ASSESSMENT:  24 year old female s/p ORIF of left mandible, I&D of left facial abscess and extraction of teeth #16,17 in OR on 24 c/b formation of sialocele now s/p irrigation and debridement of left mandible and placement of penrose drain on 24 and is progressing well with no complications     RECOMMENDATIONS   - Drain was removed today with no complications. No further surgical intervention from [facility] at this time   - Okay for discharge from the [facility] perpective   - Keep pressure dressing on at all times x 1 week  - Augmentin 875/125 x 7 days upon discharge   - Okay for ice application on face   - Home pain regimen includes tylenol 650 mg q6h, ibuprofen 600 mg q6h and prn oxycodone 5mg q6h   - Peridex BID   - Mechanical soft food diet   - Normal oral hygiene   - Follow-up next week at the [facility] clinic as outpatient. Our clinic staff will call to schedule     Oral and Maxillofacial Surgery (OMFS) Clinic  [facility]  [facility] - 7th floor  [address] [place] [address]  Clinic phone number: [phone]  Clinic fax number: [phone]       Discussed with chief resident and staff.    Jayshree Peck DDS  Oral & Maxillofacial Surgery, intern       Please contact the OMFS resident on-call with questions or concerns.  ____________________________________      SUBJECTIVE:  NAEO. She endorses some increassing pain right after she wakes up this morning but feels better after pain medications. Improving swelling and mouth opening  from patient's perspective. Denies N/V/F and other constitutional symptoms.      PHYSICAL EXAM:   Vitals:    09/28/24 1627 09/28/24 1726 09/28/24 1858 09/28/24 2341   BP: (!) 142/74 126/76 122/70 114/74   BP Location: Right arm Right arm Right arm Right arm   Pulse: 98 103 94 82   Resp: 16 16 16 18   Temp: 99.1  F (37.3  C) 99.9  F (37.7  C) 98.7  F (37.1  C) 98.7  F (37.1  C)   TempSrc: Oral Oral Oral Axillary   SpO2: 98% 98% 97% 100%   Weight:       Height:            GEN: WD/WN female, NAD  HEENT: NC, EOMI, PERRL, left neck with drain in place, mild bloody drainage no persistent bleeding, improving left sided facial swelling with some induration at the lower left neck consistent with postop edema   I/O SIMEON improving 25 mm (more than 2 fingerbreadths), left mandible with incisions are closed and secured with chromic gut sutures, no persistent bleeding, no drainage   CV: warm and well perfused   PULM: breathing comfortably on room air  MSK: GIORDANO, no peripheral extremity edema  NEURO: AAOx4, CN II-XII intact bilaterally  PSYCH: Appropriate mood and affect    LABS:   CBC RESULTS:   Recent Labs   Lab Test 09/28/24  0827   WBC 6.3   RBC 3.97   HGB 11.2*   HCT 34.7*   MCV 87   MCH 28.2   MCHC 32.3   RDW 13.7   *      Last Comprehensive Metabolic Panel:  Sodium   Date Value Ref Range Status   09/28/2024 138 135 - 145 mmol/L Final   07/09/2018 140 133 - 144 mmol/L Final     Potassium   Date Value Ref Range Status   09/28/2024 4.8 3.4 - 5.3 mmol/L Final   07/09/2018 3.5 3.4 - 5.3 mmol/L Final     Potassium POCT   Date Value Ref Range Status   09/22/2024 4.5 3.4 - 5.3 mmol/L Final     Comment:     CRITICAL RESULTS DOCUMENTED BY CATH STAFF AND MD     Chloride   Date Value Ref Range Status   09/28/2024 100 98 - 107 mmol/L Final   07/09/2018 108 96 - 110 mmol/L Final     Carbon Dioxide   Date Value Ref Range Status   07/09/2018 29 20 - 32 mmol/L Final     Carbon Dioxide (CO2)   Date Value Ref Range Status   09/28/2024 26  22 - 29 mmol/L Final     Anion Gap   Date Value Ref Range Status   09/28/2024 12 7 - 15 mmol/L Final   07/09/2018 3 3 - 14 mmol/L Final     Glucose   Date Value Ref Range Status   09/28/2024 87 70 - 99 mg/dL Final   07/09/2018 92 70 - 99 mg/dL Final     GLUCOSE BY METER POCT   Date Value Ref Range Status   09/28/2024 109 (H) 70 - 99 mg/dL Final     Comment:     Dr/RN Notified     Urea Nitrogen   Date Value Ref Range Status   09/28/2024 13.0 6.0 - 20.0 mg/dL Final   07/09/2018 7 7 - 19 mg/dL Final     Creatinine   Date Value Ref Range Status   09/28/2024 0.67 0.51 - 0.95 mg/dL Final   07/09/2018 0.74 0.50 - 1.00 mg/dL Final     GFR Estimate   Date Value Ref Range Status   09/28/2024 >90 >60 mL/min/1.73m2 Final     Comment:     eGFR calculated using 2021 CKD-EPI equation.   07/09/2018 >90 >60 mL/min/1.7m2 Final     Comment:     Non  GFR Calc     Calcium   Date Value Ref Range Status   09/28/2024 9.6 8.8 - 10.4 mg/dL Final     Comment:     Reference intervals for this test were updated on 7/16/2024 to reflect our healthy population more accurately. There may be differences in the flagging of prior results with similar values performed with this method. Those prior results can be interpreted in the context of the updated reference intervals.   07/09/2018 8.9 (L) 9.1 - 10.3 mg/dL Final        RADIOLOGY:  No new imaging to review
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
## 2024-09-29 NOTE — CONSULTS
"Consult received for Vascular access care.  See LDA for details. For additional needs place \"Nursing to Consult for Vascular Access\" [id number] order in EPIC.   "

## 2024-09-30 LAB — BACTERIA ABSC ANAEROBE+AEROBE CULT: NO GROWTH

## 2024-09-30 NOTE — PROGRESS NOTES
6MS DISCHARGE    D: Patient discharged to home at 1013pm Patient accompanied by Family member    I: Discharge prescriptions given to patient. All discharge medications and instructions reviewed with patient by charge Nurse  NIDA Decker and provider ELISSA Martell over the phone. Patient instructed to seek care if experiencing worsening symptoms, such as pain. Other phone numbers to call with questions or concerns after discharge reviewed. IV'S removed. Education completed.    A: Pt verbalized understanding of discharge medications and instructions. Prescribed home medications given to patient.  Belongings returned to patient.    P: Patient to follow-up on with Primary care

## 2024-10-03 NOTE — OP NOTE
Oral and Maxillofacial Surgery  Operative Note       Preoperative Diagnosis  Fracture of angle of left mandible  Persistent swelling left face     Postoperative Diagnosis  Same     Procedure(s):  Extraoral I and D left masseteric space.      Surgeon:  Mandi David DMD, MD, FACS     Resident Surgeon(s):  TRINIDAD Aguilar DDS     Other surgical staff (if any):  * No surgical staff found *        EBL:   < 5 mL     Drains: penrose, left neck       Prosthetic Devices:   None     Specimen Removed:     Complications: none     Indications for Surgery:   Pt is a 25 yo female s/p I and D of sub masseteric abscess and ORIF of left mandibular angle fracture. She continued to have worsening post op swelling of the left face and CT scan showed a fluid collection in the masseteric region. As such she was scheduled for I and D of collection and exploration of surgical site.    Procedure description:   On the day of surgery, the patient seen by myself  and the residents on the floor.  The procedure, benefits, risks, alternatives including no treatment were discussed again with the patient and an informed written consent was obtained for the planned procedure.  Patient was transported to the operating room and transferred to the OR table in a supine position.  Airway was secured by the anesthesia team.  Throat pack placed, oral cavity cleansed with chlorhexidine mouth rinse.  Attention was turned to the left neck. The inferior border was marked with a marking pen, 2cm inferior to this a second marking was made. Local anesthesia was administered. A fifteen blade was used to make an incision through the skin and subcutaneous tissue to the level of the superficial layer of the deep cervical fascia. A tonsil was used to bluntly dissect to the inferior border of the mandible. Using the nondominant hand, the tonsil was directed laterally towards the area of the collection and the loculation was broken up. Immediately  clear, light brown serous fluid was expressed. It was sent off for culture. The swelling at the left face immediately resolved.   Attention was then turned intraorally. A fifteen blade was used to reopen the previous incision after local anesthesia was administered. The site was explored and no purulence was noted.   All surgical sites were then irrigated with saline and a penrose drain was placed in left neck and secured with 3-0 silk suture. Hemostasis was noted. Thus, the planned procedure completed, the throat pack was removed, the patient's care was given back to the anesthesia team.   All sponge and instrument counts were correct. I was present for the key portions of the procedure.

## 2024-10-04 ENCOUNTER — OFFICE VISIT (OUTPATIENT)
Dept: INTERNAL MEDICINE | Facility: CLINIC | Age: 24
End: 2024-10-04
Payer: MEDICAID

## 2024-10-04 VITALS
WEIGHT: 112 LBS | HEIGHT: 63 IN | BODY MASS INDEX: 19.84 KG/M2 | SYSTOLIC BLOOD PRESSURE: 110 MMHG | RESPIRATION RATE: 16 BRPM | TEMPERATURE: 97.8 F | DIASTOLIC BLOOD PRESSURE: 60 MMHG | HEART RATE: 95 BPM | OXYGEN SATURATION: 96 %

## 2024-10-04 DIAGNOSIS — B07.0 PLANTAR WART OF RIGHT FOOT: ICD-10-CM

## 2024-10-04 DIAGNOSIS — F34.1 PERSISTENT DEPRESSIVE DISORDER: Chronic | ICD-10-CM

## 2024-10-04 DIAGNOSIS — F91.9 DISRUPTIVE BEHAVIOR DISORDER: ICD-10-CM

## 2024-10-04 DIAGNOSIS — Z87.898 HISTORY OF SUBSTANCE USE DISORDER: ICD-10-CM

## 2024-10-04 DIAGNOSIS — M27.2 ABSCESS OF LEFT JAW: ICD-10-CM

## 2024-10-04 DIAGNOSIS — S02.652G: ICD-10-CM

## 2024-10-04 DIAGNOSIS — Z91.410 HISTORY OF DOMESTIC PHYSICAL ABUSE IN ADULT: ICD-10-CM

## 2024-10-04 DIAGNOSIS — D64.9 ANEMIA, UNSPECIFIED TYPE: Primary | ICD-10-CM

## 2024-10-04 LAB
BASOPHILS # BLD AUTO: 0 10E3/UL (ref 0–0.2)
BASOPHILS NFR BLD AUTO: 1 %
EOSINOPHIL # BLD AUTO: 0.1 10E3/UL (ref 0–0.7)
EOSINOPHIL NFR BLD AUTO: 2 %
ERYTHROCYTE [DISTWIDTH] IN BLOOD BY AUTOMATED COUNT: 14.2 % (ref 10–15)
HCT VFR BLD AUTO: 36.5 % (ref 35–47)
HGB BLD-MCNC: 11.8 G/DL (ref 11.7–15.7)
IMM GRANULOCYTES # BLD: 0 10E3/UL
IMM GRANULOCYTES NFR BLD: 0 %
LYMPHOCYTES # BLD AUTO: 1.3 10E3/UL (ref 0.8–5.3)
LYMPHOCYTES NFR BLD AUTO: 15 %
MCH RBC QN AUTO: 27.7 PG (ref 26.5–33)
MCHC RBC AUTO-ENTMCNC: 32.3 G/DL (ref 31.5–36.5)
MCV RBC AUTO: 86 FL (ref 78–100)
MONOCYTES # BLD AUTO: 0.7 10E3/UL (ref 0–1.3)
MONOCYTES NFR BLD AUTO: 8 %
NEUTROPHILS # BLD AUTO: 6.4 10E3/UL (ref 1.6–8.3)
NEUTROPHILS NFR BLD AUTO: 75 %
PLATELET # BLD AUTO: 416 10E3/UL (ref 150–450)
RBC # BLD AUTO: 4.26 10E6/UL (ref 3.8–5.2)
WBC # BLD AUTO: 8.6 10E3/UL (ref 4–11)

## 2024-10-04 PROCEDURE — 85025 COMPLETE CBC W/AUTO DIFF WBC: CPT | Performed by: NURSE PRACTITIONER

## 2024-10-04 PROCEDURE — 80053 COMPREHEN METABOLIC PANEL: CPT | Performed by: NURSE PRACTITIONER

## 2024-10-04 PROCEDURE — 99204 OFFICE O/P NEW MOD 45 MIN: CPT | Mod: 25 | Performed by: NURSE PRACTITIONER

## 2024-10-04 PROCEDURE — 36415 COLL VENOUS BLD VENIPUNCTURE: CPT | Performed by: NURSE PRACTITIONER

## 2024-10-04 PROCEDURE — 82728 ASSAY OF FERRITIN: CPT | Performed by: NURSE PRACTITIONER

## 2024-10-04 PROCEDURE — 86140 C-REACTIVE PROTEIN: CPT | Performed by: NURSE PRACTITIONER

## 2024-10-04 PROCEDURE — 17110 DESTRUCTION B9 LES UP TO 14: CPT | Performed by: NURSE PRACTITIONER

## 2024-10-04 RX ORDER — FERROUS SULFATE 325(65) MG
TABLET ORAL
Qty: 30 TABLET | Refills: 1 | Status: SHIPPED | OUTPATIENT
Start: 2024-10-04

## 2024-10-04 ASSESSMENT — ANXIETY QUESTIONNAIRES
3. WORRYING TOO MUCH ABOUT DIFFERENT THINGS: MORE THAN HALF THE DAYS
GAD7 TOTAL SCORE: 10
6. BECOMING EASILY ANNOYED OR IRRITABLE: NEARLY EVERY DAY
5. BEING SO RESTLESS THAT IT IS HARD TO SIT STILL: NOT AT ALL
7. FEELING AFRAID AS IF SOMETHING AWFUL MIGHT HAPPEN: NOT AT ALL
IF YOU CHECKED OFF ANY PROBLEMS ON THIS QUESTIONNAIRE, HOW DIFFICULT HAVE THESE PROBLEMS MADE IT FOR YOU TO DO YOUR WORK, TAKE CARE OF THINGS AT HOME, OR GET ALONG WITH OTHER PEOPLE: SOMEWHAT DIFFICULT
GAD7 TOTAL SCORE: 10
2. NOT BEING ABLE TO STOP OR CONTROL WORRYING: SEVERAL DAYS
1. FEELING NERVOUS, ANXIOUS, OR ON EDGE: MORE THAN HALF THE DAYS
7. FEELING AFRAID AS IF SOMETHING AWFUL MIGHT HAPPEN: NOT AT ALL
4. TROUBLE RELAXING: MORE THAN HALF THE DAYS
8. IF YOU CHECKED OFF ANY PROBLEMS, HOW DIFFICULT HAVE THESE MADE IT FOR YOU TO DO YOUR WORK, TAKE CARE OF THINGS AT HOME, OR GET ALONG WITH OTHER PEOPLE?: SOMEWHAT DIFFICULT
GAD7 TOTAL SCORE: 10

## 2024-10-04 ASSESSMENT — PATIENT HEALTH QUESTIONNAIRE - PHQ9
10. IF YOU CHECKED OFF ANY PROBLEMS, HOW DIFFICULT HAVE THESE PROBLEMS MADE IT FOR YOU TO DO YOUR WORK, TAKE CARE OF THINGS AT HOME, OR GET ALONG WITH OTHER PEOPLE: VERY DIFFICULT
SUM OF ALL RESPONSES TO PHQ QUESTIONS 1-9: 14
SUM OF ALL RESPONSES TO PHQ QUESTIONS 1-9: 14

## 2024-10-04 NOTE — PROGRESS NOTES
"  {PROVIDER CHARTING PREFERENCE:148327}    Miri Castanon is a 24 year old, presenting for the following health issues:  Follow Up (Hospital follow up- [facility])      10/4/2024     1:50 PM   Additional Questions   Roomed by Veronica ADAMS   Accompanied by Grandma     HPI   {ALERT  Recent PHQ-9/EPDS score indicates suicidal ideations, document follow-up within the A/P section of the note :550312}{Provider Documentation  Link to C-SSRS (Brief [place]) Flowsheet :762821}  {MA/LPN/RN Pre-Provider Visit Orders- hCG/UA/Strep (Optional):805761}  {SUPERLIST (Optional):401063}  {additonal problems for provider to add (Optional):039685}    {ROS Picklists (Optional):045082}      Objective    /60 (BP Location: Right arm, Patient Position: Sitting)   Pulse 95   Temp 97.8  F (36.6  C)   Resp 16   Ht 1.6 m (5' 2.99\")   Wt 50.8 kg (112 lb)   LMP 09/21/2024   SpO2 96%   BMI 19.85 kg/m    Body mass index is 19.85 kg/m .  Physical Exam   {Exam List (Optional):698127}    {Diagnostic Test Results (Optional):551543}        Signed Electronically by: [place] NP  {Email feedback regarding this note to [email or web address]   :906585}  .undefined[^^  "
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
## 2024-10-04 NOTE — PATIENT INSTRUCTIONS
Mental health referral generated due to your depression scores today. You shoulder receive a phone call within 2-3 days to schedule an appointment.    Rechecking labs today.     Complete course of antibiotics.     Well start on iron supplements, take this every 3 days.     Follow-up in about 3 to 4 weeks for repeat wart treatment.  Keep a Band-Aid on the wart.  If the blisters try not to pop the blister.  Put antibiotic ointment on it twice daily.

## 2024-10-05 LAB
ALBUMIN SERPL BCG-MCNC: 4.4 G/DL (ref 3.5–5.2)
ALP SERPL-CCNC: 99 U/L (ref 40–150)
ALT SERPL W P-5'-P-CCNC: 15 U/L (ref 0–50)
ANION GAP SERPL CALCULATED.3IONS-SCNC: 10 MMOL/L (ref 7–15)
AST SERPL W P-5'-P-CCNC: 20 U/L (ref 0–45)
BACTERIA ABSC ANAEROBE+AEROBE CULT: ABNORMAL
BILIRUB SERPL-MCNC: 0.4 MG/DL
BUN SERPL-MCNC: 10.7 MG/DL (ref 6–20)
CALCIUM SERPL-MCNC: 9.6 MG/DL (ref 8.8–10.4)
CHLORIDE SERPL-SCNC: 102 MMOL/L (ref 98–107)
CREAT SERPL-MCNC: 0.69 MG/DL (ref 0.51–0.95)
CRP SERPL-MCNC: 8.62 MG/L
EGFRCR SERPLBLD CKD-EPI 2021: >90 ML/MIN/1.73M2
FERRITIN SERPL-MCNC: 41 NG/ML (ref 6–175)
GLUCOSE SERPL-MCNC: 84 MG/DL (ref 70–99)
HCO3 SERPL-SCNC: 26 MMOL/L (ref 22–29)
POTASSIUM SERPL-SCNC: 4.1 MMOL/L (ref 3.4–5.3)
PROT SERPL-MCNC: 7.5 G/DL (ref 6.4–8.3)
SODIUM SERPL-SCNC: 138 MMOL/L (ref 135–145)

## 2024-10-07 ENCOUNTER — DOCUMENTATION ONLY (OUTPATIENT)
Dept: SURGERY | Facility: CLINIC | Age: 24
End: 2024-10-07
Payer: MEDICAID
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
## 2024-10-14 DIAGNOSIS — L90.5 SCAR OF LIP: Primary | ICD-10-CM
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
## 2024-10-25 ENCOUNTER — OFFICE VISIT (OUTPATIENT)
Dept: INTERNAL MEDICINE | Facility: CLINIC | Age: 24
End: 2024-10-25
Payer: MEDICAID

## 2024-10-25 VITALS
HEIGHT: 63 IN | HEART RATE: 84 BPM | DIASTOLIC BLOOD PRESSURE: 58 MMHG | RESPIRATION RATE: 16 BRPM | WEIGHT: 122.7 LBS | OXYGEN SATURATION: 98 % | BODY MASS INDEX: 21.74 KG/M2 | TEMPERATURE: 98.2 F | SYSTOLIC BLOOD PRESSURE: 90 MMHG

## 2024-10-25 DIAGNOSIS — B07.0 PLANTAR WART OF RIGHT FOOT: Primary | ICD-10-CM

## 2024-10-25 PROCEDURE — 17110 DESTRUCTION B9 LES UP TO 14: CPT | Performed by: NURSE PRACTITIONER

## 2024-10-25 NOTE — PROGRESS NOTES
Assessment & Plan     Plantar wart of right foot  Kina is a pleasant 24-year-old female following up on right plantar wart.  This has been there for several years.  We previously did an initial treatment with liquid nitrogen on 10//24.  She states it feels like it may be slightly smaller but is still present.  Had no postprocedure concerns previously and would like to repeat the procedure.  We reviewed risks and benefits of procedure again. Area was treated with liquid nitrogen.  Antibiotic ointment and a Band-Aid was applied.  Follow-up in 2 to 4 weeks for repeat treatment.  Reviewed wound care and advised her that a blister will likely form.  Avoid popping this blister.  Watch for any signs of infection and follow-up if needed.     - DESTRUCT BENIGN LESION, UP TO 14    We will plan to complete her Pap when she follows up in 2 to 4 weeks as well.        Subjective   Kina is a 24 year old, presenting for the following health issues:  Wart      10/25/2024    10:50 AM   Additional Questions   Roomed by JILLIAN Chen     History of Present Illness       Reason for visit:  Wart freezing    She eats 0-1 servings of fruits and vegetables daily.She consumes 5 sweetened beverage(s) daily.She exercises with enough effort to increase her heart rate 9 or less minutes per day.  She exercises with enough effort to increase her heart rate 3 or less days per week. She is missing 3 dose(s) of medications per week.               ROS  Comprehensive 12-point review of systems was completed and negative except as noted in HPI.        Objective    LMP 10/24/2024 (Exact Date)   There is no height or weight on file to calculate BMI.  Physical Exam   Constitutional: In no acute distress.  Clean appearance.  Cardiovascular: Regular rate.  Respiratory: Normal respiratory effort.  Skin: Skin is pink, warm and dry.   Plantar aspect of the right foot in the mid aspect of the front of the foot is a single approximately 8 mm plantar wart.  No  surrounding erythema.  Musculoskeletal: Gait normal.    Psychiatric: Appropriate affect and demeanor.  Memory intact.  Good insight and judgment.  Neurologic: No tremor or involuntary movement noted.             Signed Electronically by: [place] NP

## 2024-10-25 NOTE — PATIENT INSTRUCTIONS
I will see you back in about 2 to 4 weeks for repeat wart treatment.  We will plan on doing her Pap at that time as well.    Try to keep the areas clean and dry as possible.  Apply antibiotic ointment twice daily and keep covered with a Band-Aid especially if the area blisters.  Watch for any signs of infection if that happens let me know.

## 2024-10-26 LAB — BACTERIA ABSC ANAEROBE+AEROBE CULT: NO GROWTH

## 2024-10-28 ENCOUNTER — ANESTHESIA EVENT (OUTPATIENT)
Dept: SURGERY | Facility: CLINIC | Age: 24
End: 2024-10-28
Payer: MEDICAID

## 2024-10-29 ENCOUNTER — HOSPITAL ENCOUNTER (OUTPATIENT)
Facility: CLINIC | Age: 24
Discharge: HOME | End: 2024-10-29
Attending: DENTIST | Admitting: DENTIST
Payer: MEDICAID

## 2024-10-29 ENCOUNTER — ANESTHESIA (OUTPATIENT)
Dept: SURGERY | Facility: CLINIC | Age: 24
End: 2024-10-29
Payer: MEDICAID

## 2024-10-29 VITALS
BODY MASS INDEX: 21.41 KG/M2 | HEIGHT: 63 IN | SYSTOLIC BLOOD PRESSURE: 118 MMHG | RESPIRATION RATE: 14 BRPM | WEIGHT: 120.81 LBS | HEART RATE: 59 BPM | DIASTOLIC BLOOD PRESSURE: 82 MMHG | OXYGEN SATURATION: 100 % | TEMPERATURE: 98.4 F

## 2024-10-29 DIAGNOSIS — K13.0: Primary | ICD-10-CM

## 2024-10-29 PROCEDURE — 999N000141 HC STATISTIC PRE-PROCEDURE NURSING ASSESSMENT: Performed by: DENTIST

## 2024-10-29 PROCEDURE — 272N000001 HC OR GENERAL SUPPLY STERILE: Performed by: DENTIST

## 2024-10-29 PROCEDURE — 250N000013 HC RX MED GY IP 250 OP 250 PS 637

## 2024-10-29 PROCEDURE — 250N000011 HC RX IP 250 OP 636: Performed by: NURSE ANESTHETIST, CERTIFIED REGISTERED

## 2024-10-29 PROCEDURE — 360N000075 HC SURGERY LEVEL 2, PER MIN: Performed by: DENTIST

## 2024-10-29 PROCEDURE — 710N000010 HC RECOVERY PHASE 1, LEVEL 2, PER MIN: Performed by: DENTIST

## 2024-10-29 PROCEDURE — 250N000009 HC RX 250: Performed by: NURSE ANESTHETIST, CERTIFIED REGISTERED

## 2024-10-29 PROCEDURE — 250N000011 HC RX IP 250 OP 636

## 2024-10-29 PROCEDURE — 17110 DESTRUCTION B9 LES UP TO 14: CPT | Performed by: NURSE ANESTHETIST, CERTIFIED REGISTERED

## 2024-10-29 PROCEDURE — 250N000011 HC RX IP 250 OP 636: Performed by: DENTIST

## 2024-10-29 PROCEDURE — 370N000017 HC ANESTHESIA TECHNICAL FEE, PER MIN: Performed by: DENTIST

## 2024-10-29 PROCEDURE — 88304 TISSUE EXAM BY PATHOLOGIST: CPT | Mod: 26 | Performed by: PATHOLOGY

## 2024-10-29 PROCEDURE — 17110 DESTRUCTION B9 LES UP TO 14: CPT | Performed by: ANESTHESIOLOGY

## 2024-10-29 PROCEDURE — 258N000003 HC RX IP 258 OP 636: Performed by: NURSE ANESTHETIST, CERTIFIED REGISTERED

## 2024-10-29 PROCEDURE — 710N000012 HC RECOVERY PHASE 2, PER MINUTE: Performed by: DENTIST

## 2024-10-29 PROCEDURE — 88304 TISSUE EXAM BY PATHOLOGIST: CPT | Mod: TC | Performed by: DENTIST

## 2024-10-29 PROCEDURE — 250N000025 HC SEVOFLURANE, PER MIN: Performed by: DENTIST

## 2024-10-29 RX ORDER — FENTANYL CITRATE 50 UG/ML
INJECTION, SOLUTION INTRAMUSCULAR; INTRAVENOUS PRN
Status: DISCONTINUED | OUTPATIENT
Start: 2024-10-29 | End: 2024-10-29

## 2024-10-29 RX ORDER — ACETAMINOPHEN 325 MG/1
975 TABLET ORAL ONCE
Status: COMPLETED | OUTPATIENT
Start: 2024-10-29 | End: 2024-10-29

## 2024-10-29 RX ORDER — IBUPROFEN 100 MG/5ML
600 SUSPENSION ORAL EVERY 6 HOURS PRN
Status: DISCONTINUED | OUTPATIENT
Start: 2024-10-29 | End: 2024-10-29 | Stop reason: HOSPADM

## 2024-10-29 RX ORDER — DEXAMETHASONE SODIUM PHOSPHATE 4 MG/ML
4 INJECTION, SOLUTION INTRA-ARTICULAR; INTRALESIONAL; INTRAMUSCULAR; INTRAVENOUS; SOFT TISSUE
Status: DISCONTINUED | OUTPATIENT
Start: 2024-10-29 | End: 2024-10-29 | Stop reason: HOSPADM

## 2024-10-29 RX ORDER — DEXAMETHASONE SODIUM PHOSPHATE 4 MG/ML
INJECTION, SOLUTION INTRA-ARTICULAR; INTRALESIONAL; INTRAMUSCULAR; INTRAVENOUS; SOFT TISSUE PRN
Status: DISCONTINUED | OUTPATIENT
Start: 2024-10-29 | End: 2024-10-29

## 2024-10-29 RX ORDER — OXYCODONE HYDROCHLORIDE 5 MG/1
5 TABLET ORAL EVERY 6 HOURS PRN
Qty: 8 TABLET | Refills: 0 | Status: SHIPPED | OUTPATIENT
Start: 2024-10-29 | End: 2024-10-29

## 2024-10-29 RX ORDER — ONDANSETRON 2 MG/ML
4 INJECTION INTRAMUSCULAR; INTRAVENOUS EVERY 30 MIN PRN
Status: DISCONTINUED | OUTPATIENT
Start: 2024-10-29 | End: 2024-10-29 | Stop reason: HOSPADM

## 2024-10-29 RX ORDER — ACETAMINOPHEN 325 MG/10.15ML
650 LIQUID ORAL EVERY 6 HOURS
Status: DISCONTINUED | OUTPATIENT
Start: 2024-10-29 | End: 2024-10-29 | Stop reason: HOSPADM

## 2024-10-29 RX ORDER — CEFAZOLIN SODIUM/WATER 2 G/20 ML
2 SYRINGE (ML) INTRAVENOUS
Status: COMPLETED | OUTPATIENT
Start: 2024-10-29 | End: 2024-10-29

## 2024-10-29 RX ORDER — OXYCODONE HYDROCHLORIDE 10 MG/1
10 TABLET ORAL
Status: DISCONTINUED | OUTPATIENT
Start: 2024-10-29 | End: 2024-10-29 | Stop reason: HOSPADM

## 2024-10-29 RX ORDER — FENTANYL CITRATE 50 UG/ML
25 INJECTION, SOLUTION INTRAMUSCULAR; INTRAVENOUS EVERY 5 MIN PRN
Status: DISCONTINUED | OUTPATIENT
Start: 2024-10-29 | End: 2024-10-29 | Stop reason: HOSPADM

## 2024-10-29 RX ORDER — OXYCODONE HYDROCHLORIDE 5 MG/1
5 TABLET ORAL EVERY 6 HOURS PRN
Qty: 8 TABLET | Refills: 0 | Status: SHIPPED | OUTPATIENT
Start: 2024-10-29 | End: 2024-11-01

## 2024-10-29 RX ORDER — OXYCODONE HYDROCHLORIDE 5 MG/1
5 TABLET ORAL
Status: COMPLETED | OUTPATIENT
Start: 2024-10-29 | End: 2024-10-29

## 2024-10-29 RX ORDER — ONDANSETRON 4 MG/1
4 TABLET, ORALLY DISINTEGRATING ORAL EVERY 30 MIN PRN
Status: DISCONTINUED | OUTPATIENT
Start: 2024-10-29 | End: 2024-10-29 | Stop reason: HOSPADM

## 2024-10-29 RX ORDER — SODIUM CHLORIDE, SODIUM LACTATE, POTASSIUM CHLORIDE, CALCIUM CHLORIDE 600; 310; 30; 20 MG/100ML; MG/100ML; MG/100ML; MG/100ML
INJECTION, SOLUTION INTRAVENOUS CONTINUOUS PRN
Status: DISCONTINUED | OUTPATIENT
Start: 2024-10-29 | End: 2024-10-29

## 2024-10-29 RX ORDER — PROPOFOL 10 MG/ML
INJECTION, EMULSION INTRAVENOUS PRN
Status: DISCONTINUED | OUTPATIENT
Start: 2024-10-29 | End: 2024-10-29

## 2024-10-29 RX ORDER — BACITRACIN ZINC 500 [USP'U]/G
OINTMENT TOPICAL CONTINUOUS PRN
Qty: 1 G | Refills: 0 | Status: SHIPPED | OUTPATIENT
Start: 2024-10-29 | End: 2024-11-01

## 2024-10-29 RX ORDER — LIDOCAINE HYDROCHLORIDE AND EPINEPHRINE 10; 10 MG/ML; UG/ML
INJECTION, SOLUTION INFILTRATION; PERINEURAL PRN
Status: DISCONTINUED | OUTPATIENT
Start: 2024-10-29 | End: 2024-10-29 | Stop reason: HOSPADM

## 2024-10-29 RX ORDER — CEFAZOLIN SODIUM/WATER 2 G/20 ML
2 SYRINGE (ML) INTRAVENOUS SEE ADMIN INSTRUCTIONS
Status: DISCONTINUED | OUTPATIENT
Start: 2024-10-29 | End: 2024-10-29 | Stop reason: HOSPADM

## 2024-10-29 RX ORDER — HYDROMORPHONE HCL IN WATER/PF 6 MG/30 ML
0.2 PATIENT CONTROLLED ANALGESIA SYRINGE INTRAVENOUS EVERY 5 MIN PRN
Status: DISCONTINUED | OUTPATIENT
Start: 2024-10-29 | End: 2024-10-29 | Stop reason: HOSPADM

## 2024-10-29 RX ORDER — ONDANSETRON 2 MG/ML
INJECTION INTRAMUSCULAR; INTRAVENOUS PRN
Status: DISCONTINUED | OUTPATIENT
Start: 2024-10-29 | End: 2024-10-29

## 2024-10-29 RX ORDER — NALOXONE HYDROCHLORIDE 0.4 MG/ML
0.1 INJECTION, SOLUTION INTRAMUSCULAR; INTRAVENOUS; SUBCUTANEOUS
Status: DISCONTINUED | OUTPATIENT
Start: 2024-10-29 | End: 2024-10-29 | Stop reason: HOSPADM

## 2024-10-29 RX ORDER — CHLORHEXIDINE GLUCONATE ORAL RINSE 1.2 MG/ML
10 SOLUTION DENTAL ONCE
Status: COMPLETED | OUTPATIENT
Start: 2024-10-29 | End: 2024-10-29

## 2024-10-29 RX ORDER — KETOROLAC TROMETHAMINE 30 MG/ML
INJECTION, SOLUTION INTRAMUSCULAR; INTRAVENOUS PRN
Status: DISCONTINUED | OUTPATIENT
Start: 2024-10-29 | End: 2024-10-29

## 2024-10-29 RX ORDER — FENTANYL CITRATE 50 UG/ML
50 INJECTION, SOLUTION INTRAMUSCULAR; INTRAVENOUS EVERY 5 MIN PRN
Status: DISCONTINUED | OUTPATIENT
Start: 2024-10-29 | End: 2024-10-29 | Stop reason: HOSPADM

## 2024-10-29 RX ORDER — HYDROMORPHONE HCL IN WATER/PF 6 MG/30 ML
0.4 PATIENT CONTROLLED ANALGESIA SYRINGE INTRAVENOUS EVERY 5 MIN PRN
Status: DISCONTINUED | OUTPATIENT
Start: 2024-10-29 | End: 2024-10-29 | Stop reason: HOSPADM

## 2024-10-29 RX ORDER — LIDOCAINE HYDROCHLORIDE 20 MG/ML
INJECTION, SOLUTION INFILTRATION; PERINEURAL PRN
Status: DISCONTINUED | OUTPATIENT
Start: 2024-10-29 | End: 2024-10-29

## 2024-10-29 RX ADMIN — Medication 2 G: at 08:04

## 2024-10-29 RX ADMIN — Medication 200 MG: at 09:20

## 2024-10-29 RX ADMIN — LIDOCAINE HYDROCHLORIDE 60 MG: 20 INJECTION, SOLUTION INFILTRATION; PERINEURAL at 07:57

## 2024-10-29 RX ADMIN — HYDROMORPHONE HYDROCHLORIDE 0.5 MG: 1 INJECTION, SOLUTION INTRAMUSCULAR; INTRAVENOUS; SUBCUTANEOUS at 08:46

## 2024-10-29 RX ADMIN — Medication 20 MG: at 08:37

## 2024-10-29 RX ADMIN — ONDANSETRON 4 MG: 2 INJECTION INTRAMUSCULAR; INTRAVENOUS at 08:12

## 2024-10-29 RX ADMIN — FENTANYL CITRATE 100 MCG: 50 INJECTION INTRAMUSCULAR; INTRAVENOUS at 07:57

## 2024-10-29 RX ADMIN — CHLORHEXIDINE GLUCONATE 0.12% ORAL RINSE 10 ML: 1.2 LIQUID ORAL at 07:06

## 2024-10-29 RX ADMIN — Medication 50 MG: at 07:57

## 2024-10-29 RX ADMIN — MIDAZOLAM 2 MG: 1 INJECTION INTRAMUSCULAR; INTRAVENOUS at 07:53

## 2024-10-29 RX ADMIN — SODIUM CHLORIDE, POTASSIUM CHLORIDE, SODIUM LACTATE AND CALCIUM CHLORIDE: 600; 310; 30; 20 INJECTION, SOLUTION INTRAVENOUS at 07:53

## 2024-10-29 RX ADMIN — OXYCODONE HYDROCHLORIDE 5 MG: 5 TABLET ORAL at 11:04

## 2024-10-29 RX ADMIN — ACETAMINOPHEN 975 MG: 325 TABLET, FILM COATED ORAL at 07:06

## 2024-10-29 RX ADMIN — KETOROLAC TROMETHAMINE 30 MG: 30 INJECTION, SOLUTION INTRAMUSCULAR at 09:15

## 2024-10-29 RX ADMIN — DEXAMETHASONE SODIUM PHOSPHATE 4 MG: 4 INJECTION, SOLUTION INTRA-ARTICULAR; INTRALESIONAL; INTRAMUSCULAR; INTRAVENOUS; SOFT TISSUE at 08:05

## 2024-10-29 RX ADMIN — PROPOFOL 200 MG: 10 INJECTION, EMULSION INTRAVENOUS at 07:57

## 2024-10-29 ASSESSMENT — ACTIVITIES OF DAILY LIVING (ADL)
ADLS_ACUITY_SCORE: 0

## 2024-10-29 ASSESSMENT — LIFESTYLE VARIABLES: TOBACCO_USE: 1

## 2024-10-29 NOTE — ANESTHESIA PROCEDURE NOTES
Airway         Procedure Start/Stop Times: 10/29/2024 7:58 AM  Staff -        CRNA: Lorie Clark APRN CRNA       Performed By: CRNAIndications and Patient Condition       Indications for airway management: conor-procedural       Induction type:intravenous       Mask difficulty assessment: 1 - vent by mask    Final Airway Details       Final airway type: endotracheal airway       Successful airway: ETT - single  Endotracheal Airway Details        ETT size (mm): 7.0       Cuffed: yes       Successful intubation technique: direct laryngoscopy       DL Blade Type: MAC 3       Grade View of Cords: 1       Adjucts: stylet       Position: Center       Measured from: gums/teeth       Secured at (cm): 21       Bite block used: None    Post intubation assessment        Placement verified by: capnometry, equal breath sounds and chest rise        Number of attempts at approach: 1       Ease of procedure: easy       Dentition: Intact       Dental guard used and removed.    Medication(s) Administered   Medication Administration Time: 10/29/2024 7:58 AM

## 2024-10-29 NOTE — ANESTHESIA PREPROCEDURE EVALUATION
Anesthesia Pre-Procedure Evaluation    Patient: Dunia Corley   MRN: [id number] : 2000        Procedure : Procedure(s):  EXCISION, LESION, LIP, possible surgical revision          History reviewed. No pertinent past medical history.   Past Surgical History:   Procedure Laterality Date    INCISION AND DRAINAGE NECK, COMBINED Left 2024    Procedure: Incision and drainage neck, combined;  Surgeon: Juvencio Miner DDS;  [facility]    IRRIGATION AND DEBRIDEMENT MANDIBLE, COMBINED Left 2024    Procedure: Irrigation and debridement, combined, of left mandible;  Surgeon: Mandi David MD;  [facility]    OPEN REDUCTION INTERNAL FIXATION MANDIBLE Left 2024    Procedure: Open reduction internal fixation Left Mandable Angle Fracture, Application of Maxillodibullar Fixation, Irrigation and debridement of facial abscess via intra and extra oral approaches, Extraction of teeth #16 and #17;  Surgeon: Juvencio Miner DDS;  Location[facility]      No Known Allergies   Social History     Tobacco Use    Smoking status: Former     Types: Vaping Device     Passive exposure: Past    Smokeless tobacco: Never   Substance Use Topics    Alcohol use: Yes     Comment: Infrequently      Wt Readings from Last 1 Encounters:   10/29/24 54.8 kg (120 lb 13 oz)        Anesthesia Evaluation            ROS/MED HX  ENT/Pulmonary:     (+)                tobacco use, Past use,                       Neurologic:  - neg neurologic ROS     Cardiovascular:  - neg cardiovascular ROS     METS/Exercise Tolerance:     Hematologic:  - neg hematologic  ROS     Musculoskeletal:       GI/Hepatic:  - neg GI/hepatic ROS     Renal/Genitourinary:  - neg Renal ROS     Endo:  - neg endo ROS     Psychiatric/Substance Use:       Infectious Disease:  - neg infectious disease ROS     Malignancy:       Other:               OUTSIDE LABS:  CBC:   Lab Results   Component Value Date    WBC 8.6 10/04/2024    WBC 11.7 (H) 2024    HGB 11.8  10/04/2024    HGB 9.9 (L) 09/29/2024    HCT 36.5 10/04/2024    HCT 30.6 (L) 09/29/2024     10/04/2024     (H) 09/29/2024     BMP:   Lab Results   Component Value Date     10/04/2024     09/29/2024    POTASSIUM 4.1 10/04/2024    POTASSIUM 3.5 09/29/2024    CHLORIDE 102 10/04/2024    CHLORIDE 103 09/29/2024    CO2 26 10/04/2024    CO2 26 09/29/2024    BUN 10.7 10/04/2024    BUN 12.0 09/29/2024    CR 0.69 10/04/2024    CR 0.52 09/29/2024    GLC 84 10/04/2024     (H) 09/29/2024     COAGS:   Lab Results   Component Value Date    PTT 36 09/21/2024    INR 1.10 09/21/2024     POC:   Lab Results   Component Value Date    HCGS Negative 09/21/2024     HEPATIC:   Lab Results   Component Value Date    ALBUMIN 4.4 10/04/2024    PROTTOTAL 7.5 10/04/2024    ALT 15 10/04/2024    AST 20 10/04/2024    ALKPHOS 99 10/04/2024    BILITOTAL 0.4 10/04/2024     OTHER:   Lab Results   Component Value Date    LACT 0.9 09/22/2024    A1C 5.5 09/24/2024    EUGENE 9.6 10/04/2024    TSH 1.84 07/09/2018       Anesthesia Plan    ASA Status:  1       Anesthesia Type: General.     - Airway: ETT   Induction: Intravenous.   Maintenance: Inhalation.        Consents          - Extended Intubation/Ventilatory Support Discussed: No.      - Patient is DNR/DNI Status: No     Use of blood products discussed: No .     Postoperative Care    Pain management: Multi-modal analgesia.   PONV prophylaxis: Ondansetron (or other 5HT-3), Dexamethasone or Solumedrol     Comments:               Thao Weldon MD    I have reviewed the pertinent notes and labs in the chart from the past 30 days and (re)examined the patient.  Any updates or changes from those notes are reflected in this note.                           # Financial/Environmental Concerns:

## 2024-10-29 NOTE — DISCHARGE INSTRUCTIONS
After a Tooth Extraction:   Caring for Your Mouth:  Doing certain things, even on the first day, may help you feel better and heal faster. Sutures, if used, will fall out on their own in a few days to a few weeks. Keep them clean.  Control Bleeding: To help control bleeding, bite firmly on the gauze placed by your dentist. The pressure helps to form a blood clot. If you have a lot of bleeding, bite on a regular tea bag. The tannic acid in the tea aids in forming a blood clot. Bite on the gauze or the tea bag until the bleeding stops. Slight oozing of blood on the first day is normal.  Minimize Pain: Pain could be at its peak about 3-4 days after surgery and will start to decrease after. To lessen any pain, take prescribed medication as directed. Don't drive while taking any pain medication as you may feel drowsy. Ask your dentist if you may take over-the-counter medication, if needed.  Reduce Swelling: Swelling could be at its peak about 3-4 days after surgery and will start to decrease after. To reduce swelling, put an ice pack on your cheek near the extraction site. You can make an ice pack by putting ice in a plastic bag and wrapping it in a thin towel. Apply the ice pack to your cheek for 10 minutes. Then, remove it for 5 minutes. Repeat as needed. You may see some bruising on your face. This is normal and will go away on its own.  Get Enough Rest: Limit activities for the first 24 hours after an extraction. Rest during the day and go to bed early. When lying down, elevate your head slightly.  Do s: Below are some things to do to help your mouth heal.  Do eat a diet of soft, healthy foods and snacks. It may be easier for you to eat soft foods soon after your extraction. Also, drink plenty of liquids.  Do brush your teeth gently. Avoid brushing around the extraction. And don't use any toothpaste. Rinsing toothpaste from your mouth may dislodge the blood clot.  Do keep the suture site clean. After 12 hours you may  be able to gently rinse your mouth. Rinse 4 times a day with 1 teaspoon of salt in a glass of water. Check with your dentist first. CAUTION: Rinse your mouth very gently.  Don ts: Below are some things to avoid while you're healing.  Don't drink hot liquids. Hot liquids may increase swelling. Limit your alcohol use. Excessive use of alcohol may slow healing.  Don't smoke. Smoking may break down the blood clot, causing a painful tooth socket.  Call Your Oral Surgeon If: Pain becomes more severe the day after your extraction. Bleeding becomes hard to control. Swelling around the extraction site worsens. Itching or rashes occur after you take medication. Please call [phone] to ask for the Oral & Maxillofacial Surgery resident on call if questions or concerns.     Contacting your Doctor -   To contact a doctor, call Dr. David at [phone] or:  [phone] and ask for the resident on call for Oral & Maxillofacial Surgery (answered 24 hours a day)   Emergency Department:  [facility]: [phone] 911 if you are in need of immediate or emergent help

## 2024-10-29 NOTE — ANESTHESIA POSTPROCEDURE EVALUATION
Patient: Dunia Corley    Procedure: Procedure(s):  EXCISION, LESION, LIP       Anesthesia Type:  General    Note:  Disposition: Outpatient   Postop Pain Control: Uneventful            Sign Out: Well controlled pain   PONV: No   Neuro/Psych: Uneventful            Sign Out: Acceptable/Baseline neuro status   Airway/Respiratory: Uneventful            Sign Out: Acceptable/Baseline resp. status   CV/Hemodynamics: Uneventful            Sign Out: Acceptable CV status; No obvious hypovolemia; No obvious fluid overload   Other NRE: NONE   DID A NON-ROUTINE EVENT OCCUR? No           Last vitals:  Vitals Value Taken Time   /81 10/29/24 1015   Temp 36.4  C (97.6  F) 10/29/24 1015   Pulse 79 10/29/24 1029   Resp 11 10/29/24 1029   SpO2 100 % 10/29/24 1029   Vitals shown include unfiled device data.    Electronically Signed By: Armani Newby MD  October 29, 2024  10:29 AM

## 2024-10-29 NOTE — BRIEF OP NOTE
[facility]    Brief Operative Note    Pre-operative diagnosis: Scar of lip [L90.5]  Post-operative diagnosis Same as pre-operative diagnosis    Procedure: EXCISION, LESION, LIP, Right - Face    Surgeon: Surgeons and Role:     * Mandi David MD - Primary     * Chrissie Cavazos MD - Resident - Assisting     * Amy Petit MD - Resident - Assisting     * Benson Novak DDS - Resident - Assisting  Anesthesia: General   Estimated Blood Loss: Minimal    Drains: None  Specimens:   ID Type Source Tests Collected by Time Destination   1 : Right upper lip Tissue Other SURGICAL PATHOLOGY EXAM Mandi David MD 10/29/2024  8:49 AM      Findings:   None.  Complications: None.  Implants: * No implants in log *

## 2024-10-29 NOTE — ANESTHESIA CARE TRANSFER NOTE
Patient: Dunia Corley    Procedure: Procedure(s):  EXCISION, LESION, LIP       Diagnosis: Scar of lip [L90.5]  Diagnosis Additional Information: No value filed.    Anesthesia Type:   General     Note:      Level of Consciousness: awake  Oxygen Supplementation: face mask  Level of Supplemental Oxygen (L/min / FiO2): 5  Independent Airway: airway patency satisfactory and stable  Dentition: dentition unchanged  Vital Signs Stable: post-procedure vital signs reviewed and stable  Report to RN Given: handoff report given  Patient transferred to: PACU    Handoff Report: Identifed the Patient, Identified the Reponsible Provider, Reviewed the pertinent medical history, Discussed the surgical course, Reviewed Intra-OP anesthesia mangement and issues during anesthesia, Set expectations for post-procedure period and Allowed opportunity for questions and acknowledgement of understanding      Vitals:  Vitals Value Taken Time   /83 10/29/24 0933   Temp 36.3    Pulse 84 10/29/24 0937   Resp 13 10/29/24 0937   SpO2 100 % 10/29/24 0937   Vitals shown include unfiled device data.    Electronically Signed By: JOSE ENRIQUE Baugh CRNA  October 29, 2024  9:37 AM

## 2024-10-29 NOTE — OP NOTE
Oral & Maxillofacial Surgery   Operative Note      Procedure:   Excisional biopsy of right upper lip      Pre-Operative Diagnosis:   Lesion and scarring of right upper lip      Post-Operative Diagnosis:  Same as pre-operative diagnosis     Surgeon:  Mandi David DMD, MD      Resident Surgeon:  TRINIDAD Ortiz DDS, DDS     EBL:  < 1 mL     Drains:   None    Prosthetic Devices:   * No implants in log *     Specimen:   Right upper lip lesion      Complications: none     Findings: heavy scarring of the orbicularis oris muscle.      Indications for Surgery:   Pt is a nereyda 40 yo M who underwent I and D of multiple neck space abscess. Despite improving labs his clinical exam was positive for continued purulent drainage. As such a new CT scan was obtained which showed a new collection at the right angle of the mandible. He was then posted for repeat I and D and exploration and debridement.        Procedure Description:   On the day of surgery, the procedure, benefits, risks, alternatives including no treatment were discussed again with the patient and an informed written consent was obtained for the planned procedure.  Patient was transported to the operating room and transferred to the OR table in a supine position.  Oral bushra was placed and secured.  A throat pack was placed, the oral cavity was scrubbed with chlorhexidine and the patient was prepped and draped.  Surgeons stepped out to scrub and returned to don sterile gown and gloves. A surgical timeout was performed by all members of the team.  The anatomic positions of the lip were marked out with a marking pen including the white roll and the wet-dry line. Two fibrotic lesions were noted but the most medial one was marked for excision with an ellipse. Local anesthesia was administered and 7 minutes were allowed to elapse for vacosconstriction to take place. Next a 15c blade was used to excise the lesion to the level of the orbicularis.   Hemostasis was obtained with cautery. The site was then washed and then attention was turned to closure. The muscle layer was closed with 4-0 vicryl sutures and the mucosa was then closed with 5-0 prolene sutures in an interuppted fashion. The patient was washed and dried. Throat pack was removed, OGT was passed. Bacitracin was applied to the lip and she was transferred to the care of the anesthesiologist where she was extubated without incident and transferred to PACU in stable condition.    All sponge and instrument counts were correct.
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
## 2024-11-01 ENCOUNTER — OFFICE VISIT (OUTPATIENT)
Dept: INFECTIOUS DISEASES | Facility: CLINIC | Age: 24
End: 2024-11-01
Attending: STUDENT IN AN ORGANIZED HEALTH CARE EDUCATION/TRAINING PROGRAM
Payer: MEDICAID

## 2024-11-01 VITALS
WEIGHT: 121 LBS | DIASTOLIC BLOOD PRESSURE: 69 MMHG | TEMPERATURE: 97.8 F | HEART RATE: 71 BPM | SYSTOLIC BLOOD PRESSURE: 109 MMHG | HEIGHT: 63 IN | OXYGEN SATURATION: 98 % | BODY MASS INDEX: 21.44 KG/M2

## 2024-11-01 DIAGNOSIS — S02.609B: Primary | ICD-10-CM

## 2024-11-01 LAB
PATH REPORT.COMMENTS IMP SPEC: NORMAL
PATH REPORT.COMMENTS IMP SPEC: NORMAL
PATH REPORT.FINAL DX SPEC: NORMAL
PATH REPORT.GROSS SPEC: NORMAL
PATH REPORT.MICROSCOPIC SPEC OTHER STN: NORMAL
PATH REPORT.RELEVANT HX SPEC: NORMAL
PHOTO IMAGE: NORMAL

## 2024-11-01 PROCEDURE — G0463 HOSPITAL OUTPT CLINIC VISIT: HCPCS

## 2024-11-01 PROCEDURE — 99215 OFFICE O/P EST HI 40 MIN: CPT | Mod: 24 | Performed by: STUDENT IN AN ORGANIZED HEALTH CARE EDUCATION/TRAINING PROGRAM

## 2024-11-01 ASSESSMENT — PAIN SCALES - GENERAL: PAINLEVEL_OUTOF10: NO PAIN (0)

## 2024-11-01 NOTE — NURSING NOTE
"Chief Complaint   Patient presents with    RECHECK     Follow Up     /69   Pulse 71   Temp 97.8  F (36.6  C) (Oral)   Ht 1.6 m (5' 3\")   Wt 54.9 kg (121 lb)   LMP  (LMP Unknown)   SpO2 98%   BMI 21.43 kg/m    Betty Francois on 11/1/2024 at 1:05 PM    "
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
## 2024-11-24 ENCOUNTER — HEALTH MAINTENANCE LETTER (OUTPATIENT)
Age: 24
End: 2024-11-24

## 2025-01-28 ENCOUNTER — OFFICE VISIT (OUTPATIENT)
Dept: INTERNAL MEDICINE | Facility: CLINIC | Age: 25
End: 2025-01-28
Payer: COMMERCIAL

## 2025-01-28 VITALS
OXYGEN SATURATION: 99 % | RESPIRATION RATE: 14 BRPM | DIASTOLIC BLOOD PRESSURE: 80 MMHG | HEIGHT: 63 IN | BODY MASS INDEX: 23.04 KG/M2 | TEMPERATURE: 98.1 F | WEIGHT: 130 LBS | HEART RATE: 88 BPM | SYSTOLIC BLOOD PRESSURE: 120 MMHG

## 2025-01-28 DIAGNOSIS — D64.9 ANEMIA, UNSPECIFIED TYPE: ICD-10-CM

## 2025-01-28 DIAGNOSIS — B07.0 PLANTAR WART OF RIGHT FOOT: ICD-10-CM

## 2025-01-28 DIAGNOSIS — Z11.3 SCREENING FOR STDS (SEXUALLY TRANSMITTED DISEASES): Primary | ICD-10-CM

## 2025-01-28 DIAGNOSIS — H57.12 LEFT EYE PAIN: ICD-10-CM

## 2025-01-28 DIAGNOSIS — Z12.4 CERVICAL CANCER SCREENING: ICD-10-CM

## 2025-01-28 PROCEDURE — 87491 CHLMYD TRACH DNA AMP PROBE: CPT | Performed by: NURSE PRACTITIONER

## 2025-01-28 PROCEDURE — 87591 N.GONORRHOEAE DNA AMP PROB: CPT | Performed by: NURSE PRACTITIONER

## 2025-01-28 PROCEDURE — 99459 PELVIC EXAMINATION: CPT | Performed by: NURSE PRACTITIONER

## 2025-01-28 PROCEDURE — 17110 DESTRUCTION B9 LES UP TO 14: CPT | Performed by: NURSE PRACTITIONER

## 2025-01-28 PROCEDURE — 99214 OFFICE O/P EST MOD 30 MIN: CPT | Mod: 25 | Performed by: NURSE PRACTITIONER

## 2025-01-28 RX ORDER — FERROUS SULFATE 325(65) MG
TABLET ORAL
Qty: 90 TABLET | Refills: 1 | Status: SHIPPED | OUTPATIENT
Start: 2025-01-28

## 2025-01-28 ASSESSMENT — PATIENT HEALTH QUESTIONNAIRE - PHQ9
SUM OF ALL RESPONSES TO PHQ QUESTIONS 1-9: 14
10. IF YOU CHECKED OFF ANY PROBLEMS, HOW DIFFICULT HAVE THESE PROBLEMS MADE IT FOR YOU TO DO YOUR WORK, TAKE CARE OF THINGS AT HOME, OR GET ALONG WITH OTHER PEOPLE: SOMEWHAT DIFFICULT
SUM OF ALL RESPONSES TO PHQ QUESTIONS 1-9: 14

## 2025-01-28 NOTE — PROGRESS NOTES
Assessment & Plan     Plantar wart of right foot  Findings on physical exam are consistent with plantar wart.  Reviewed risks and benefits of procedure.   A dermablade was used to remove callus/thickened skin prior to treatment.  Area was then treated with liquid nitrogen.  Antibiotic ointment and a Band-Aid was applied.  Follow-up in 2 to 4 weeks for repeat treatment.  Reviewed wound care and advised her that a blister will likely form.  Avoid popping this blister.  Watch for any signs of infection and follow-up if needed.   - DESTRUCT BENIGN LESION, UP TO 14    Cervical cancer screening  Reviewed cervical cancer screening guidelines.  Pap completed today.  - Pap Screen Only - Recommended Age 21 - 24 Years    Screening for STDs (sexually transmitted diseases)  Denies any known exposure but due for screening for gonorrhea chlamydia.  Patient was okay to screening today.  This was collected during her pelvic exam.  - Chlamydia trachomatis/Neisseria gonorrhoeae by PCR- VAGINAL SELF-SWAB; Future  - Chlamydia trachomatis/Neisseria gonorrhoeae by PCR- VAGINAL SELF-SWAB    Anemia, unspecified type  Has a history of anemia.  Currently takes an iron supplementation every 3 days.  Previous CBC shows hemoglobin level was normal at 11.8 while on the supplementation.  Medication was refilled today.  - REVIEW OF HEALTH MAINTENANCE PROTOCOL ORDERS  - ferrous sulfate (FEROSUL) 325 (65 Fe) MG tablet; Take one tab every 3 days.    Left eye pain  Patient reports concerns with some left eye pain.  She was seen in urgent care on 1/4/2025 and diagnosed with pinkeye of the left eye.  She states the redness and the drainage has improved but she still has eye pain on and off and occasionally wakes up with crusting.  She denies any visual changes.  Would like formal eye exam especially as she is concerned as she has been seeing infectious disease.  Referral placed today.  - Adult Eye  Referral; Future      Subjective   Kina is a  "24 year old, presenting for the following health issues:  Gyn Exam and wart removal (Right foot)      1/28/2025     1:32 PM   Additional Questions   Roomed by Veronica ADAMS     History of Present Illness       Reason for visit:  Wart freezing    She eats 0-1 servings of fruits and vegetables daily.She consumes 2 sweetened beverage(s) daily.She exercises with enough effort to increase her heart rate 9 or less minutes per day.  She exercises with enough effort to increase her heart rate 3 or less days per week. She is missing 2 dose(s) of medications per week.       ROS  Comprehensive 12-point review of systems was completed and negative except as noted in HPI.        Objective    /80 (BP Location: Right arm, Patient Position: Sitting)   Pulse 88   Temp 98.1  F (36.7  C)   Resp 14   Ht 1.6 m (5' 3\")   Wt 59 kg (130 lb)   LMP 01/12/2025 (Exact Date)   SpO2 99%   BMI 23.03 kg/m    Body mass index is 23.03 kg/m .  Physical Exam   Constitutional: In no acute distress.  Clean appearance.  Eyes: PERRLA.  EOMI.  No conjunctival redness.  Cardiovascular: Regular rate.  Respiratory:  Normal respiratory effort.  Genitourinary:  No gross vaginal discharge.  No odor or lesions.  Cervical os is visualized, no abnormal lesions or discharge noted.     Musculoskeletal: Gait normal.  Able to mount exam table without difficulties.  Plantar wart noted at the ball amount of the right foot.  Psychiatric: Appropriate affect and demeanor.  Memory intact.  Good insight and judgment.  Neurologic: Sensation and temperature of extremities appropriate.  No tremor or involuntary movement noted.              Signed Electronically by: [place] NP    "

## 2025-01-28 NOTE — PATIENT INSTRUCTIONS
Your Pap result will be released to you through [facility] in the next 1 to 2 weeks.  Your STI screening will be released to you in the next 1 to 2 days.    I will see you again next month for your preop and we will plan on doing an additional wart treatment at that time.    At this time keep a Band-Aid on the warts and make sure you are applying an antibiotic ointment twice daily until it scabs over.  Avoid popping any blister if 1 forms.    I placed a referral for an eye exam.  They should call you within a few days to schedule this.    Let me know anytime you have any questions or concerns.

## 2025-01-29 LAB
C TRACH DNA SPEC QL PROBE+SIG AMP: NEGATIVE
N GONORRHOEA DNA SPEC QL NAA+PROBE: NEGATIVE
SPECIMEN TYPE: NORMAL

## 2025-01-30 ENCOUNTER — OFFICE VISIT (OUTPATIENT)
Dept: OPTOMETRY | Facility: CLINIC | Age: 25
End: 2025-01-30
Attending: NURSE PRACTITIONER
Payer: COMMERCIAL

## 2025-01-30 DIAGNOSIS — H57.12 LEFT EYE PAIN: ICD-10-CM

## 2025-01-30 RX ORDER — PREDNISOLONE ACETATE 10 MG/ML
SUSPENSION/ DROPS OPHTHALMIC
Qty: 5 ML | Refills: 1 | Status: SHIPPED | OUTPATIENT
Start: 2025-01-30 | End: 2025-02-20

## 2025-01-30 ASSESSMENT — TONOMETRY
IOP_METHOD: APPLANATION
OS_IOP_MMHG: 15
OD_IOP_MMHG: 18

## 2025-01-30 ASSESSMENT — CONF VISUAL FIELD
METHOD: COUNTING FINGERS
OS_NORMAL: 1
OS_SUPERIOR_TEMPORAL_RESTRICTION: 0
OD_SUPERIOR_NASAL_RESTRICTION: 0
OD_NORMAL: 1
OD_INFERIOR_NASAL_RESTRICTION: 0
OS_INFERIOR_TEMPORAL_RESTRICTION: 0
OD_INFERIOR_TEMPORAL_RESTRICTION: 0
OS_SUPERIOR_NASAL_RESTRICTION: 0
OS_INFERIOR_NASAL_RESTRICTION: 0
OD_SUPERIOR_TEMPORAL_RESTRICTION: 0

## 2025-01-30 ASSESSMENT — VISUAL ACUITY
METHOD: SNELLEN - LINEAR
OS_PH_SC+: -1
OS_SC: 20/25
OS_PH_SC: 20/20
OD_SC: 20/20
OS_SC+: -2

## 2025-01-30 ASSESSMENT — CUP TO DISC RATIO: OS_RATIO: 0.3

## 2025-01-30 ASSESSMENT — SLIT LAMP EXAM - LIDS
COMMENTS: NORMAL
COMMENTS: NORMAL

## 2025-01-30 ASSESSMENT — EXTERNAL EXAM - LEFT EYE: OS_EXAM: NORMAL

## 2025-01-30 ASSESSMENT — EXTERNAL EXAM - RIGHT EYE: OD_EXAM: NORMAL

## 2025-01-30 NOTE — PROGRESS NOTES
Chief Complaint   Patient presents with    Conjunctivitis Follow-Up    Eye Pain     Eye Pain    In left eye. Characterized as irritation, burning, and sharp pain. Occurring intermittently. It is worse in the morning. Duration of weeks - went to  on 1/4/25 and diagnosed with conjunctivitis.     Prescribed antibiotic drops she used q6h for 2 weeks and oral antibiotics bid x 1 week. Completed course of both and symptoms resolved for the most part but have been coming back every AM.  Last used any drops ~2 weeks ago.    Since onset it is gradually worsening. Wakes up in AM and symptoms are present: pain, redness, itching, photophobia, tearing, and swelling. Symptoms last for ~30 mins and then resolve on their own. Not using any AT's or warm/cold compresses. Initial onset her eye would be matted shut in the AM but that is not still occurring.     Not currently having any pain/discomfort other than mild dryness.     Do you wear contact lenses? No       Yvonne Hampton  Optometry Assistant      Medical, surgical and family histories reviewed and updated 1/30/2025.         OBJECTIVE: See Ophthalmology exam    ASSESSMENT:    ICD-10-CM    1. Left eye pain  H57.12 Adult Eye  Referral         PLAN:    Patient Instructions   Begin taper schedule of Pred Forte eyedrops.   Use 1 drop 3x daily in the left eye for one week, then reduce to 1 drop 2x daily in the left eye for one week, then 2x daily left eye for one week, then discontinue.     OK to use artificial tears as well. Use these at least ~20 minutes apart from using the steroid eyedrops.     Notify me with any further concerns.       Jluis Stern, OD  [facility] [place]  [address] [place]  [address]    [phone]

## 2025-01-30 NOTE — PATIENT INSTRUCTIONS
Begin taper schedule of Pred Forte eyedrops.   Use 1 drop 3x daily in the left eye for one week, then reduce to 1 drop 2x daily in the left eye for one week, then 2x daily left eye for one week, then discontinue.     OK to use artificial tears as well. Use these at least ~20 minutes apart from using the steroid eyedrops.     Notify me with any further concerns.       Jluis Stern, KANIKA  [facility]  [place]  [address]    [phone]

## 2025-01-30 NOTE — LETTER
1/30/2025      Dunia Corley  [address] [place] [address]      Dear Colleague,    Thank you for referring your patient, Dunia Corley, to the [facility]. Please see a copy of my visit note below.    Chief Complaint   Patient presents with     Conjunctivitis Follow-Up     Eye Pain     Eye Pain    In left eye. Characterized as irritation, burning, and sharp pain. Occurring intermittently. It is worse in the morning. Duration of weeks - went to  on 1/4/25 and diagnosed with conjunctivitis.     Prescribed antibiotic drops she used q6h for 2 weeks and oral antibiotics bid x 1 week. Completed course of both and symptoms resolved for the most part but have been coming back every AM.  Last used any drops ~2 weeks ago.    Since onset it is gradually worsening. Wakes up in AM and symptoms are present: pain, redness, itching, photophobia, tearing, and swelling. Symptoms last for ~30 mins and then resolve on their own. Not using any AT's or warm/cold compresses. Initial onset her eye would be matted shut in the AM but that is not still occurring.     Not currently having any pain/discomfort other than mild dryness.     Do you wear contact lenses? No       Yvonne Hampton  Optometry Assistant      Medical, surgical and family histories reviewed and updated 1/30/2025.         OBJECTIVE: See Ophthalmology exam    ASSESSMENT:    ICD-10-CM    1. Left eye pain  H57.12 Adult Eye  Referral         PLAN:    Patient Instructions   Begin taper schedule of Pred Forte eyedrops.   Use 1 drop 3x daily in the left eye for one week, then reduce to 1 drop 2x daily in the left eye for one week, then 2x daily left eye for one week, then discontinue.     OK to use artificial tears as well. Use these at least ~20 minutes apart from using the steroid eyedrops.     Notify me with any further concerns.       Jluis Stern, KANIKA  [facility] [place]  [address] [place]   [address]    [phone]        Again, thank you for allowing me to participate in the care of your patient.        Sincerely,        Jluis Stern OD    Electronically signed

## 2025-03-03 DIAGNOSIS — K13.0 LESION OF LIP: Primary | ICD-10-CM

## 2025-03-13 ENCOUNTER — OFFICE VISIT (OUTPATIENT)
Dept: INTERNAL MEDICINE | Facility: CLINIC | Age: 25
End: 2025-03-13
Payer: COMMERCIAL

## 2025-03-13 VITALS
WEIGHT: 138 LBS | DIASTOLIC BLOOD PRESSURE: 58 MMHG | OXYGEN SATURATION: 99 % | HEIGHT: 63 IN | BODY MASS INDEX: 24.45 KG/M2 | HEART RATE: 89 BPM | RESPIRATION RATE: 16 BRPM | TEMPERATURE: 98.2 F | SYSTOLIC BLOOD PRESSURE: 106 MMHG

## 2025-03-13 DIAGNOSIS — Z01.818 PREOP GENERAL PHYSICAL EXAM: Primary | ICD-10-CM

## 2025-03-13 DIAGNOSIS — L90.5 ABNORMAL SCARRING OF SKIN: ICD-10-CM

## 2025-03-13 LAB
ERYTHROCYTE [DISTWIDTH] IN BLOOD BY AUTOMATED COUNT: 13.9 % (ref 10–15)
HCG UR QL: NEGATIVE
HCT VFR BLD AUTO: 35 % (ref 35–47)
HGB BLD-MCNC: 11.5 G/DL (ref 11.7–15.7)
MCH RBC QN AUTO: 28.5 PG (ref 26.5–33)
MCHC RBC AUTO-ENTMCNC: 32.9 G/DL (ref 31.5–36.5)
MCV RBC AUTO: 87 FL (ref 78–100)
PLATELET # BLD AUTO: 258 10E3/UL (ref 150–450)
RBC # BLD AUTO: 4.04 10E6/UL (ref 3.8–5.2)
WBC # BLD AUTO: 5 10E3/UL (ref 4–11)

## 2025-03-13 ASSESSMENT — PATIENT HEALTH QUESTIONNAIRE - PHQ9
SUM OF ALL RESPONSES TO PHQ QUESTIONS 1-9: 15
SUM OF ALL RESPONSES TO PHQ QUESTIONS 1-9: 15
10. IF YOU CHECKED OFF ANY PROBLEMS, HOW DIFFICULT HAVE THESE PROBLEMS MADE IT FOR YOU TO DO YOUR WORK, TAKE CARE OF THINGS AT HOME, OR GET ALONG WITH OTHER PEOPLE: SOMEWHAT DIFFICULT

## 2025-03-13 NOTE — PATIENT INSTRUCTIONS
How to Take Your Medication Before Surgery  Preoperative Medication Instructions   Avoid aspirin and over-the-counter NSAIDs including ibuprofen, Motrin, Advil and Aleve for 1 week prior to your surgery          Patient Education   Preparing for Your Surgery  For Adults  Getting started  In most cases, a nurse will call to review your health history and instructions. They will give you an arrival time based on your scheduled surgery time. Please be ready to share:  Your doctor's clinic name and phone number  Your medical, surgical, and anesthesia history  A list of allergies and sensitivities  A list of medicines, including herbal treatments and over-the-counter drugs  Whether the patient has a legal guardian (ask how to send us the papers in advance)  Note: You may not receive a call if you were seen at our PAC (Preoperative Assessment Center).  Please tell us if you're pregnant--or if there's any chance you might be pregnant. Some surgeries may injure a fetus (unborn baby), so they require a pregnancy test. Surgeries that are safe for a fetus don't always need a test, and you can choose whether to have one.   Preparing for surgery  Within 10 to 30 days of surgery: Have a pre-op exam (sometimes called an H&P, or History and Physical). This can be done at a clinic or pre-operative center.  If you're having a , you may not need this exam. Talk to your care team.  At your pre-op exam, talk to your care team about all medicines you take. (This includes CBD oil and any drugs, such as THC, marijuana, and other forms of cannabis.) If you need to stop any medicine before surgery, ask when to start taking it again.  This is for your safety. Many medicines and drugs can make you bleed too much during surgery. Some change how well surgery (anesthesia) drugs work.  Call your insurance company to let them know you're having surgery. (If you don't have insurance, call [phone].)  Call your clinic if there's any  change in your health. This includes a scrape or scratch near the surgery site, or any signs of a cold (sore throat, runny nose, cough, rash, fever).  Eating and drinking guidelines  For your safety: Unless your surgeon tells you otherwise, follow the guidelines below.  Eat and drink as normal until 8 hours before you arrive for surgery. After that, no food or milk. You can spit out gum when you arrive.  Drink clear liquids until 2 hours before you arrive. These are liquids you can see through, like water, Gatorade, and Propel Water. They also include plain black coffee and tea (no cream or milk).  No alcohol for 24 hours before you arrive. The night before surgery, stop any drinks that contain THC.  If your care team tells you to take medicine on the morning of surgery, it's okay to take it with a sip of water. No other medicines or drugs are allowed (including CBD oil)--follow your care team's instructions.  If you have questions the day of surgery, call your hospital or surgery center.   Preventing infection  Shower or bathe the night before and the morning of surgery. Follow the instructions your clinic gave you. (If no instructions, use regular soap.)  Don't shave or clip hair near your surgery site. We'll remove the hair if needed.  Don't smoke or vape the morning of surgery. No chewing tobacco for 6 hours before you arrive. A nicotine patch is okay. You may spit out nicotine gum when you arrive.  For some surgeries, the surgeon will tell you to fully quit smoking and nicotine.  We will make every effort to keep you safe from infection. We will:  Clean our hands often with soap and water (or an alcohol-based hand rub).  Clean the skin at your surgery site with a special soap that kills germs.  Give you a special gown to keep you warm. (Cold raises the risk of infection.)  Wear hair covers, masks, gowns, and gloves during surgery.  Give antibiotic medicine, if prescribed. Not all surgeries need this  medicine.  What to bring on the day of surgery  Photo ID and insurance card  Copy of your health care directive, if you have one  Glasses and hearing aids (bring cases)  You can't wear contacts during surgery  Inhaler and eye drops, if you use them (tell us about these when you arrive)  CPAP machine or breathing device, if you use them  A few personal items, if spending the night  If you have . . .  A pacemaker, ICD (cardiac defibrillator), or other implant: Bring the ID card.  An implanted stimulator: Bring the remote control.  A legal guardian: Bring a copy of the certified (court-stamped) guardianship papers.  Please remove any jewelry, including body piercings. Leave jewelry and other valuables at home.  If you're going home the day of surgery  You must have a responsible adult drive you home. They should stay with you overnight as well.  If you don't have someone to stay with you, and you aren't safe to go home alone, we may keep you overnight. Insurance often won't pay for this.  After surgery  If it's hard to control your pain or you need more pain medicine, please call your surgeon's office.  Questions?   If you have any questions for your care team, list them here:   ____________________________________________________________________________________________________________________________________________________________________________________________________________________________________________________________  For informational purposes only. Not to replace the advice of your health care provider. Copyright   2003, 2019 [facility]. All rights reserved. Clinically reviewed by Tigre Urrutia MD. [employer] [id number] - REV 08/24.

## 2025-03-13 NOTE — PROGRESS NOTES
Preoperative Evaluation  [facility]  [address] [address]  Phone: [phone]  Fax: [phone]  Primary Provider: Physician No Ref-Primary  Pre-op Performing Provider: Adolfo Mercado MD  Mar 13, 2025             3/13/2025   Surgical Information   What procedure is being done? Surgery on my lip   Facility or Hospital where procedure/surgery will be performed: [facility]   Who is doing the procedure / surgery? Dr David   Date of surgery / procedure: 03/18/25   Time of surgery / procedure: 12pm   Where do you plan to recover after surgery? at home with family     Fax number for surgical facility: Note does not need to be faxed, will be available electronically in Epic.    Assessment & Plan     The proposed surgical procedure is considered INTERMEDIATE risk.    Preop general physical exam  24-year-old woman here for preop clearance prior to upcoming surgery to remove abnormal scar formation on right upper lip.  She has tolerated previous surgery and anesthesia without any complications.  She will avoid aspirin and NSAIDs during the week prior to surgery.  Overall risk is low and she is cleared to proceed as planned  - HCG qualitative urine; Future  - CBC with platelets; Future  - HCG qualitative urine  - CBC with platelets    Abnormal scarring of skin  Abnormal scar formation on right upper lip from previous injury needing surgical resection        Preoperative Medication Instructions  Avoid aspirin and over-the-counter NSAIDs including ibuprofen, Motrin, Advil and Aleve for 1 week prior to your surgery     Recommendation  Approval given to proceed with proposed procedure, without further diagnostic evaluation.    Miri Castanon is a 24 year old, presenting for the following: Here for preop clearance prior to surgery for removal of scar tissue from right upper lip.  See assessment and plan for details  Pre-Op Exam          3/13/2025     3:39  PM   Additional Questions   Roomed by              3/13/2025   Pre-Op Questionnaire   Have you ever had a heart attack or stroke? No   Have you ever had surgery on your heart or blood vessels, such as a stent placement, a coronary artery bypass, or surgery on an artery in your head, neck, heart, or legs? No   Do you have chest pain with activity? No   Do you have a history of heart failure? No   Do you currently have a cold, bronchitis or symptoms of other infection? No   Do you have a cough, shortness of breath, or wheezing? No   Do you or anyone in your family have previous history of blood clots? No   Do you or does anyone in your family have a serious bleeding problem such as prolonged bleeding following surgeries or cuts? No   Have you ever had problems with anemia or been told to take iron pills? (!) YES    Have you had any abnormal blood loss such as black, tarry or bloody stools, or abnormal vaginal bleeding? No   Have you ever had a blood transfusion? No   Are you willing to have a blood transfusion if it is medically needed before, during, or after your surgery? Yes   Have you or any of your relatives ever had problems with anesthesia? No   Do you have sleep apnea, excessive snoring or daytime drowsiness? No   Do you have any artifical heart valves or other implanted medical devices like a pacemaker, defibrillator, or continuous glucose monitor? No   Do you have artificial joints? No   Are you allergic to latex? No     Health Care Directive  Patient does not have a Health Care Directive:     Preoperative Review of    reviewed - no controlled substances prescribed in the last 3 months          Patient Active Problem List    Diagnosis Date Noted    Hypokalemia 09/21/2024     Priority: Medium    Oral abscess 09/21/2024     Priority: Medium    Open fracture of left side of mandible, unspecified mandibular site, initial encounter (H) 09/21/2024     Priority: Medium    Acute suppurative otitis media of  left ear with spontaneous rupture of tympanic membrane, recurrence not specified 11/22/2022     Priority: Medium    Painful orthopaedic hardware 03/15/2022     Priority: Medium     Formatting of this note might be different from the original.  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  Added automatically from request for surgery [id number]      History of anemia 09/28/2020     Priority: Medium    Other insomnia 08/13/2018     Priority: Medium    Persistent depressive disorder with intermittent major depressive episodes, current episode moderate 07/10/2018     Priority: Medium    Generalized anxiety disorder 07/10/2018     Priority: Medium    Unspecified disruptive, impulse-control, and conduct disorder 07/10/2018     Priority: Medium    Alcohol use disorder, mild 07/10/2018     Priority: Medium    Cannabis use disorder, moderate 07/10/2018     Priority: Medium    Cocaine use disorder, severe 07/10/2018     Priority: Medium    Sedative, hypnotic or anxiolytic use disorder, severe 07/10/2018     Priority: Medium    Tobacco use disorder, moderate 07/10/2018     Priority: Medium    Low ferritin 07/10/2018     Priority: Medium    Vitamin D deficiency 07/10/2018     Priority: Medium    History of substance use disorder 07/10/2018     Priority: Medium     Formatting of this note might be different from the original.  Etoh, cocaine, rx antianxiety meds, marijuana in 2018.   2021:  Pt denies use since.      Behavior concern 07/08/2018     Priority: Medium    Acne 11/20/2014     Priority: Medium    Immunization not carried out because of caregiver refusal 11/20/2014     Priority: Medium      No past medical history on file.  Past Surgical History:   Procedure Laterality Date    EXCISE LESION LIP Right 10/29/2024    Procedure: EXCISION, LESION, LIP;   "Surgeon: Mandi David MD;  Location: [facility] OR    INCISION AND DRAINAGE NECK, COMBINED Left 9/22/2024    Procedure: Incision and drainage neck, combined;  Surgeon: Juvencio Miner DDS;  Location: [facility] OR    IRRIGATION AND DEBRIDEMENT MANDIBLE, COMBINED Left 9/28/2024    Procedure: Irrigation and debridement, combined, of left mandible;  Surgeon: Mandi David MD;  Location: [facility] OR    OPEN REDUCTION INTERNAL FIXATION MANDIBLE Left 9/22/2024    Procedure: Open reduction internal fixation Left Mandable Angle Fracture, Application of Maxillodibullar Fixation, Irrigation and debridement of facial abscess via intra and extra oral approaches, Extraction of teeth #16 and #17;  Surgeon: Juvencio Miner DDS;  Location: [facility] OR     Current Outpatient Medications   Medication Sig Dispense Refill    acetaminophen (TYLENOL) 325 MG tablet Take 2 tablets (650 mg) by mouth every 4 hours as needed for mild pain. 60 tablet 0    chlorhexidine (PERIDEX) 0.12 % solution Swish and spit 15 mLs in mouth 2 times daily. 473 mL 0    ferrous sulfate (FEROSUL) 325 (65 Fe) MG tablet Take one tab every 3 days. 90 tablet 1    ibuprofen (ADVIL/MOTRIN) 600 MG tablet Take 1 tablet (600 mg) by mouth every 6 hours as needed for inflammatory pain. 60 tablet 0       No Known Allergies     Social History     Tobacco Use    Smoking status: Former     Types: Vaping Device     Passive exposure: Past    Smokeless tobacco: Never   Substance Use Topics    Alcohol use: Yes     Comment: Infrequently       History   Drug Use Unknown     Comment: Drug use: Current Xanax, history of cocaine, LSD, percocet, and marijuana use             Review of Systems  Constitutional, HEENT, cardiovascular, pulmonary, gi and gu systems are negative, except as otherwise noted.    Objective    /58 (BP Location: Right arm, Patient Position: Sitting, Cuff Size: Adult Regular)   Pulse 89   Temp 98.2  F (36.8  C) (Oral)   Resp 16   Ht 1.6 m (5' 3\")   Wt 62.6 kg (138 lb)   LMP " "03/09/2025 (Exact Date)   SpO2 99%   Breastfeeding No   BMI 24.45 kg/m     Estimated body mass index is 24.45 kg/m  as calculated from the following:    Height as of this encounter: 1.6 m (5' 3\").    Weight as of this encounter: 62.6 kg (138 lb).  Physical Exam  GENERAL: Well-appearing young woman.  Alert and no distress  HEENT normal except for abnormal scar formation on right upper lip  NECK: No thyromegaly or lymphadenopathy  RESP: lungs clear to auscultation - no rales, rhonchi or wheezes  CV: regular rate and rhythm, normal S1 S2, no S3 or S4, no murmur, click or rub, no peripheral edema  ABDOMEN: soft, nontender, no hepatosplenomegaly, no masses and bowel sounds normal  NEURO: Intact    Recent Labs   Lab Test 10/04/24  1514 09/29/24  0905 09/25/24  0648 09/24/24  0845 09/21/24  2158 09/21/24  1205   HGB 11.8 9.9*   < > 11.1*   < >  --     502*   < > 538*   < >  --    INR  --   --   --   --   --  1.10    137   < > 141   < >  --    POTASSIUM 4.1 3.5   < > 3.6   < >  --    CR 0.69 0.52   < > 0.58   < >  --    A1C  --   --   --  5.5  --   --     < > = values in this interval not displayed.        Diagnostics  Recent Results (from the past 24 hours)   CBC with platelets    Collection Time: 03/13/25  4:20 PM   Result Value Ref Range    WBC Count 5.0 4.0 - 11.0 10e3/uL    RBC Count 4.04 3.80 - 5.20 10e6/uL    Hemoglobin 11.5 (L) 11.7 - 15.7 g/dL    Hematocrit 35.0 35.0 - 47.0 %    MCV 87 78 - 100 fL    MCH 28.5 26.5 - 33.0 pg    MCHC 32.9 31.5 - 36.5 g/dL    RDW 13.9 10.0 - 15.0 %    Platelet Count 258 150 - 450 10e3/uL   HCG qualitative urine    Collection Time: 03/13/25  4:21 PM   Result Value Ref Range    hCG Urine Qualitative Negative Negative      No EKG required, no history of coronary heart disease, significant arrhythmia, peripheral arterial disease or other structural heart disease.    Revised Cardiac Risk Index (RCRI)  The patient has the following serious cardiovascular risks for " perioperative complications:   - No serious cardiac risks = 0 points     RCRI Interpretation: 0 points: Class I (very low risk - 0.4% complication rate)         Signed Electronically by: Adolfo Mercado MD  A copy of this evaluation report is provided to the requesting physician.        Answers submitted by the patient for this visit:  Patient Health Questionnaire (Submitted on 3/13/2025)  If you checked off any problems, how difficult have these problems made it for you to do your work, take care of things at home, or get along with other people?: Somewhat difficult  PHQ9 TOTAL SCORE: 15
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
## 2025-03-18 ENCOUNTER — ANESTHESIA (OUTPATIENT)
Dept: SURGERY | Facility: CLINIC | Age: 25
End: 2025-03-18
Payer: COMMERCIAL

## 2025-03-18 ENCOUNTER — HOSPITAL ENCOUNTER (OUTPATIENT)
Facility: CLINIC | Age: 25
Discharge: HOME | End: 2025-03-18
Attending: DENTIST | Admitting: DENTIST
Payer: COMMERCIAL

## 2025-03-18 ENCOUNTER — ANESTHESIA EVENT (OUTPATIENT)
Dept: SURGERY | Facility: CLINIC | Age: 25
End: 2025-03-18
Payer: COMMERCIAL

## 2025-03-18 VITALS
SYSTOLIC BLOOD PRESSURE: 115 MMHG | DIASTOLIC BLOOD PRESSURE: 75 MMHG | TEMPERATURE: 98.1 F | RESPIRATION RATE: 16 BRPM | BODY MASS INDEX: 24.61 KG/M2 | OXYGEN SATURATION: 98 % | HEIGHT: 63 IN | WEIGHT: 138.89 LBS | HEART RATE: 71 BPM

## 2025-03-18 DIAGNOSIS — L90.5 ABNORMAL SCARRING OF SKIN: Primary | ICD-10-CM

## 2025-03-18 PROCEDURE — 250N000009 HC RX 250

## 2025-03-18 PROCEDURE — 999N000127 HC STATISTIC PERIPHERAL IV START W US GUIDANCE

## 2025-03-18 PROCEDURE — 250N000009 HC RX 250: Performed by: DENTIST

## 2025-03-18 PROCEDURE — 88305 TISSUE EXAM BY PATHOLOGIST: CPT | Mod: 26 | Performed by: STUDENT IN AN ORGANIZED HEALTH CARE EDUCATION/TRAINING PROGRAM

## 2025-03-18 PROCEDURE — 250N000011 HC RX IP 250 OP 636

## 2025-03-18 PROCEDURE — 272N000001 HC OR GENERAL SUPPLY STERILE: Performed by: DENTIST

## 2025-03-18 PROCEDURE — 710N000012 HC RECOVERY PHASE 2, PER MINUTE: Performed by: DENTIST

## 2025-03-18 PROCEDURE — 88305 TISSUE EXAM BY PATHOLOGIST: CPT | Mod: TC | Performed by: DENTIST

## 2025-03-18 PROCEDURE — 258N000003 HC RX IP 258 OP 636

## 2025-03-18 PROCEDURE — 710N000010 HC RECOVERY PHASE 1, LEVEL 2, PER MIN: Performed by: DENTIST

## 2025-03-18 PROCEDURE — 370N000017 HC ANESTHESIA TECHNICAL FEE, PER MIN: Performed by: DENTIST

## 2025-03-18 PROCEDURE — 250N000025 HC SEVOFLURANE, PER MIN: Performed by: DENTIST

## 2025-03-18 PROCEDURE — 250N000013 HC RX MED GY IP 250 OP 250 PS 637

## 2025-03-18 PROCEDURE — 360N000075 HC SURGERY LEVEL 2, PER MIN: Performed by: DENTIST

## 2025-03-18 PROCEDURE — 999N000141 HC STATISTIC PRE-PROCEDURE NURSING ASSESSMENT: Performed by: DENTIST

## 2025-03-18 PROCEDURE — 250N000011 HC RX IP 250 OP 636: Performed by: DENTIST

## 2025-03-18 RX ORDER — NALOXONE HYDROCHLORIDE 0.4 MG/ML
0.1 INJECTION, SOLUTION INTRAMUSCULAR; INTRAVENOUS; SUBCUTANEOUS
Status: DISCONTINUED | OUTPATIENT
Start: 2025-03-18 | End: 2025-03-18 | Stop reason: HOSPADM

## 2025-03-18 RX ORDER — FENTANYL CITRATE 50 UG/ML
INJECTION, SOLUTION INTRAMUSCULAR; INTRAVENOUS PRN
Status: DISCONTINUED | OUTPATIENT
Start: 2025-03-18 | End: 2025-03-18

## 2025-03-18 RX ORDER — FENTANYL CITRATE 50 UG/ML
50 INJECTION, SOLUTION INTRAMUSCULAR; INTRAVENOUS EVERY 5 MIN PRN
Status: DISCONTINUED | OUTPATIENT
Start: 2025-03-18 | End: 2025-03-18 | Stop reason: HOSPADM

## 2025-03-18 RX ORDER — CHLORHEXIDINE GLUCONATE ORAL RINSE 1.2 MG/ML
10 SOLUTION DENTAL ONCE
Status: COMPLETED | OUTPATIENT
Start: 2025-03-18 | End: 2025-03-18

## 2025-03-18 RX ORDER — ONDANSETRON 4 MG/1
4 TABLET, ORALLY DISINTEGRATING ORAL EVERY 30 MIN PRN
Status: DISCONTINUED | OUTPATIENT
Start: 2025-03-18 | End: 2025-03-18 | Stop reason: HOSPADM

## 2025-03-18 RX ORDER — DEXAMETHASONE SODIUM PHOSPHATE 4 MG/ML
4 INJECTION, SOLUTION INTRA-ARTICULAR; INTRALESIONAL; INTRAMUSCULAR; INTRAVENOUS; SOFT TISSUE
Status: DISCONTINUED | OUTPATIENT
Start: 2025-03-18 | End: 2025-03-18 | Stop reason: HOSPADM

## 2025-03-18 RX ORDER — LIDOCAINE 40 MG/G
CREAM TOPICAL
Status: DISCONTINUED | OUTPATIENT
Start: 2025-03-18 | End: 2025-03-18 | Stop reason: HOSPADM

## 2025-03-18 RX ORDER — FENTANYL CITRATE 50 UG/ML
25 INJECTION, SOLUTION INTRAMUSCULAR; INTRAVENOUS EVERY 5 MIN PRN
Status: DISCONTINUED | OUTPATIENT
Start: 2025-03-18 | End: 2025-03-18 | Stop reason: HOSPADM

## 2025-03-18 RX ORDER — OXYMETAZOLINE HYDROCHLORIDE 0.05 G/100ML
SPRAY NASAL PRN
Status: DISCONTINUED | OUTPATIENT
Start: 2025-03-18 | End: 2025-03-18

## 2025-03-18 RX ORDER — SODIUM CHLORIDE, SODIUM LACTATE, POTASSIUM CHLORIDE, CALCIUM CHLORIDE 600; 310; 30; 20 MG/100ML; MG/100ML; MG/100ML; MG/100ML
INJECTION, SOLUTION INTRAVENOUS CONTINUOUS PRN
Status: DISCONTINUED | OUTPATIENT
Start: 2025-03-18 | End: 2025-03-18

## 2025-03-18 RX ORDER — SODIUM CHLORIDE, SODIUM LACTATE, POTASSIUM CHLORIDE, CALCIUM CHLORIDE 600; 310; 30; 20 MG/100ML; MG/100ML; MG/100ML; MG/100ML
INJECTION, SOLUTION INTRAVENOUS CONTINUOUS
Status: DISCONTINUED | OUTPATIENT
Start: 2025-03-18 | End: 2025-03-18 | Stop reason: HOSPADM

## 2025-03-18 RX ORDER — CHLORHEXIDINE GLUCONATE ORAL RINSE 1.2 MG/ML
15 SOLUTION DENTAL 2 TIMES DAILY
Qty: 473 ML | Refills: 0 | Status: SHIPPED | OUTPATIENT
Start: 2025-03-18

## 2025-03-18 RX ORDER — OXYCODONE HYDROCHLORIDE 5 MG/1
5 TABLET ORAL
Status: DISCONTINUED | OUTPATIENT
Start: 2025-03-18 | End: 2025-03-18 | Stop reason: HOSPADM

## 2025-03-18 RX ORDER — ONDANSETRON 2 MG/ML
4 INJECTION INTRAMUSCULAR; INTRAVENOUS EVERY 30 MIN PRN
Status: DISCONTINUED | OUTPATIENT
Start: 2025-03-18 | End: 2025-03-18 | Stop reason: HOSPADM

## 2025-03-18 RX ORDER — IBUPROFEN 600 MG/1
600 TABLET, FILM COATED ORAL EVERY 6 HOURS PRN
Qty: 28 TABLET | Refills: 0 | Status: SHIPPED | OUTPATIENT
Start: 2025-03-18 | End: 2025-03-25

## 2025-03-18 RX ORDER — CEFAZOLIN SODIUM/WATER 2 G/20 ML
2 SYRINGE (ML) INTRAVENOUS SEE ADMIN INSTRUCTIONS
Status: DISCONTINUED | OUTPATIENT
Start: 2025-03-18 | End: 2025-03-18 | Stop reason: HOSPADM

## 2025-03-18 RX ORDER — ONDANSETRON 2 MG/ML
INJECTION INTRAMUSCULAR; INTRAVENOUS PRN
Status: DISCONTINUED | OUTPATIENT
Start: 2025-03-18 | End: 2025-03-18

## 2025-03-18 RX ORDER — DEXAMETHASONE SODIUM PHOSPHATE 4 MG/ML
INJECTION, SOLUTION INTRA-ARTICULAR; INTRALESIONAL; INTRAMUSCULAR; INTRAVENOUS; SOFT TISSUE PRN
Status: DISCONTINUED | OUTPATIENT
Start: 2025-03-18 | End: 2025-03-18

## 2025-03-18 RX ORDER — HYDROMORPHONE HCL IN WATER/PF 6 MG/30 ML
0.4 PATIENT CONTROLLED ANALGESIA SYRINGE INTRAVENOUS EVERY 5 MIN PRN
Status: DISCONTINUED | OUTPATIENT
Start: 2025-03-18 | End: 2025-03-18 | Stop reason: HOSPADM

## 2025-03-18 RX ORDER — CEFAZOLIN SODIUM/WATER 2 G/20 ML
2 SYRINGE (ML) INTRAVENOUS
Status: DISCONTINUED | OUTPATIENT
Start: 2025-03-18 | End: 2025-03-18 | Stop reason: HOSPADM

## 2025-03-18 RX ORDER — BUPIVACAINE HYDROCHLORIDE AND EPINEPHRINE 2.5; 5 MG/ML; UG/ML
INJECTION, SOLUTION INFILTRATION; PERINEURAL PRN
Status: DISCONTINUED | OUTPATIENT
Start: 2025-03-18 | End: 2025-03-18 | Stop reason: HOSPADM

## 2025-03-18 RX ORDER — OXYCODONE HYDROCHLORIDE 10 MG/1
10 TABLET ORAL
Status: COMPLETED | OUTPATIENT
Start: 2025-03-18 | End: 2025-03-18

## 2025-03-18 RX ORDER — ACETAMINOPHEN 325 MG/1
650 TABLET ORAL ONCE
Status: COMPLETED | OUTPATIENT
Start: 2025-03-18 | End: 2025-03-18

## 2025-03-18 RX ORDER — OXYCODONE HYDROCHLORIDE 5 MG/1
5 TABLET ORAL EVERY 6 HOURS PRN
Qty: 12 TABLET | Refills: 0 | Status: SHIPPED | OUTPATIENT
Start: 2025-03-18 | End: 2025-03-21

## 2025-03-18 RX ORDER — HYDROMORPHONE HCL IN WATER/PF 6 MG/30 ML
0.2 PATIENT CONTROLLED ANALGESIA SYRINGE INTRAVENOUS EVERY 5 MIN PRN
Status: DISCONTINUED | OUTPATIENT
Start: 2025-03-18 | End: 2025-03-18 | Stop reason: HOSPADM

## 2025-03-18 RX ORDER — ACETAMINOPHEN 325 MG/1
650 TABLET ORAL EVERY 6 HOURS PRN
Qty: 56 TABLET | Refills: 0 | Status: SHIPPED | OUTPATIENT
Start: 2025-03-18 | End: 2025-03-25

## 2025-03-18 RX ORDER — LIDOCAINE HYDROCHLORIDE 20 MG/ML
INJECTION, SOLUTION INFILTRATION; PERINEURAL PRN
Status: DISCONTINUED | OUTPATIENT
Start: 2025-03-18 | End: 2025-03-18

## 2025-03-18 RX ORDER — PROPOFOL 10 MG/ML
INJECTION, EMULSION INTRAVENOUS PRN
Status: DISCONTINUED | OUTPATIENT
Start: 2025-03-18 | End: 2025-03-18

## 2025-03-18 RX ADMIN — PHENYLEPHRINE HYDROCHLORIDE 100 MCG: 10 INJECTION INTRAVENOUS at 16:29

## 2025-03-18 RX ADMIN — CHLORHEXIDINE GLUCONATE 10 ML: 1.2 SOLUTION ORAL at 13:24

## 2025-03-18 RX ADMIN — FENTANYL CITRATE 50 MCG: 50 INJECTION INTRAMUSCULAR; INTRAVENOUS at 15:58

## 2025-03-18 RX ADMIN — Medication 100 MG: at 16:45

## 2025-03-18 RX ADMIN — DEXMEDETOMIDINE HYDROCHLORIDE 8 MCG: 100 INJECTION, SOLUTION INTRAVENOUS at 15:52

## 2025-03-18 RX ADMIN — DEXMEDETOMIDINE HYDROCHLORIDE 4 MCG: 100 INJECTION, SOLUTION INTRAVENOUS at 15:59

## 2025-03-18 RX ADMIN — PROPOFOL 150 MG: 10 INJECTION, EMULSION INTRAVENOUS at 15:28

## 2025-03-18 RX ADMIN — PHENYLEPHRINE HYDROCHLORIDE 50 MCG: 10 INJECTION INTRAVENOUS at 16:17

## 2025-03-18 RX ADMIN — DEXAMETHASONE SODIUM PHOSPHATE 8 MG: 4 INJECTION, SOLUTION INTRA-ARTICULAR; INTRALESIONAL; INTRAMUSCULAR; INTRAVENOUS; SOFT TISSUE at 15:28

## 2025-03-18 RX ADMIN — FENTANYL CITRATE 50 MCG: 50 INJECTION INTRAMUSCULAR; INTRAVENOUS at 15:09

## 2025-03-18 RX ADMIN — Medication 50 MG: at 15:28

## 2025-03-18 RX ADMIN — Medication 2 G: at 15:14

## 2025-03-18 RX ADMIN — ACETAMINOPHEN 650 MG: 325 TABLET, FILM COATED ORAL at 13:24

## 2025-03-18 RX ADMIN — OXYCODONE HYDROCHLORIDE 10 MG: 5 TABLET ORAL at 18:16

## 2025-03-18 RX ADMIN — FENTANYL CITRATE 50 MCG: 50 INJECTION INTRAMUSCULAR; INTRAVENOUS at 15:23

## 2025-03-18 RX ADMIN — MIDAZOLAM 2 MG: 1 INJECTION INTRAMUSCULAR; INTRAVENOUS at 15:03

## 2025-03-18 RX ADMIN — DEXMEDETOMIDINE HYDROCHLORIDE 4 MCG: 100 INJECTION, SOLUTION INTRAVENOUS at 16:07

## 2025-03-18 RX ADMIN — ONDANSETRON 4 MG: 2 INJECTION INTRAMUSCULAR; INTRAVENOUS at 16:35

## 2025-03-18 RX ADMIN — OXYMETAZOLINE HYDROCHLORIDE 2 SPRAY: 0.05 SPRAY NASAL at 15:28

## 2025-03-18 RX ADMIN — SODIUM CHLORIDE, SODIUM LACTATE, POTASSIUM CHLORIDE, AND CALCIUM CHLORIDE: .6; .31; .03; .02 INJECTION, SOLUTION INTRAVENOUS at 15:03

## 2025-03-18 RX ADMIN — LIDOCAINE HYDROCHLORIDE 80 MG: 20 INJECTION, SOLUTION INFILTRATION; PERINEURAL at 15:28

## 2025-03-18 ASSESSMENT — ACTIVITIES OF DAILY LIVING (ADL)
ADLS_ACUITY_SCORE: 49
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
## 2025-03-18 NOTE — ANESTHESIA PROCEDURE NOTES
Airway       Patient location during procedure: OR       Procedure Start/Stop Times: 3/18/2025 3:37 PM  Staff -        Anesthesiologist:  Xu Pierson MD       CRNA: Toni Mccollum APRN CRNA       Performed By: CRNA  Consent for Airway        Urgency: elective  Indications and Patient Condition       Indications for airway management: conor-procedural       Induction type:intravenous       Mask difficulty assessment: 1 - vent by mask    Final Airway Details       Final airway type: endotracheal airway       Successful airway: ETT - single, Oral and STEVEN  Endotracheal Airway Details        ETT size (mm): 6.5       Cuffed: yes       Successful intubation technique: video laryngoscopy       VL Blade Size: MAC 3       Grade View of Cords: 1       Adjucts: stylet       Position: Right       Measured from: gums/teeth       Secured at (cm): 20       Bite block used: None    Post intubation assessment        Placement verified by: capnometry, equal breath sounds and chest rise        Number of attempts at approach: 2       Number of other approaches attempted: 0       Secured with: tape       Ease of procedure: easy       Dentition: Intact    Medication(s) Administered   Medication Administration Time: 3/18/2025 3:37 PM    Additional Comments       Initial intubation attempt with left sided nasal STEVEN unable to pass 6.5 or 6.0 tube through nasopharynx. Right nare reserved for surgery so changed plan to oral STEVEN, easy DL for oral intubation. O2 Sats maintained at 100% throughout.

## 2025-03-18 NOTE — DISCHARGE INSTRUCTIONS
HOME CARE INSTRUCTIONS FOLLOWING SURGERY  - Alternate 650mg acetaminophen with 600mg ibuprofen every three hours (for example, take acetaminophen at 9:00am, then take ibuprofen at 12:00pm, then take acetaminophen at 3:00pm and so on)  - Add in 5mg oxycodone every 6 hours as needed for pain  - We will remove the sutures in clinic in one week  - Keep your lip moisturized with Vaseline or Aquaphor; use Bacitracin for only 2-3 days  - Okay for showers and to wash your face. Let the soapy water gently run over the incision and then gently pat to dry. Do not scrub  - You can eat most foods, just be careful not to stretch your mouth wide open for large bites, and do not eat anything that hurts  - Your lip will be numb for a few hours  - Avoid strenuous physical activity  - Follow up in clinic in one week    AFTER HOURS CONTACT FOR EMERGENCIES:  Please call the [facility] switchboard at [phone] and ask to have the Oral and Maxillofacial Surgery Resident On-call paged.     It is our desire to make your recovery as smooth as possible. These instructions are given to assist you following your surgery. If you have any questions please call the clinic at [phone].           After You Have Sedation During Surgery  What should I do after surgery?  You should rest and relax for the next 24 hours. Avoid risky (hazardous) and difficult (strenuous) activity. A responsible adult caregiver should stay with you overnight, after your surgery.  Don't drive or use any heavy equipment for 24 hours after your surgery. Even if you feel normal, your reactions may be affected by the sleep medicine given to you.  Don't drink alcohol or make any important decisions for 24 hours after surgery.  Slowly get back to your regular diet, as you feel able to do so.  How should I expect to feel?  It's normal to feel dizzy, light-headed, or faint for up to a full day after surgery, or while taking pain medicine. If this happens:   Sit down  for a few minutes before standing.  Have someone help you when you get up to walk or use the bathroom.  If you have nausea (feel sick to your stomach) and/or vomit (throw up) after sedation (anesthesia):  Drink clear liquids (such as apple juice, ginger ale, broth, or 7-Up) until you feel better.  If you feel sick to your stomach, or you keep vomiting for 24 hours, please call the doctor.  What else should I know?  You might have a dry mouth, a sore throat, muscle aches, or have trouble sleeping. These issues should go away after 24 hours.  Please contact your doctor if you have any other symptoms that concern you, such as fever, pain, bleeding, fluid drainage, swelling, or headache. Or if it has been over 8 to 10 hours since surgery and you still aren't able to pee (urinate).  If you have a history of sleep apnea, it's extremely important that you use your CPAP machine for the next 24 hours when you nap or sleep.      For informational purposes only. Not to replace the advice of your health care provider. Copyright   2023 [employer]. All rights reserved. Clinically reviewed by Tigre Urrutia MD. [employer] [id number] - 07/23.

## 2025-03-18 NOTE — ANESTHESIA POSTPROCEDURE EVALUATION
Patient: Dunia Corley    Procedure: Procedure(s):  EXCISION, LESION, RIGHT UPPER LIP       Anesthesia Type:  General    Note:  Disposition: Outpatient   Postop Pain Control: Uneventful            Sign Out: Well controlled pain   PONV: No   Neuro/Psych: Uneventful            Sign Out: Acceptable/Baseline neuro status   Airway/Respiratory: Uneventful            Sign Out: Acceptable/Baseline resp. status   CV/Hemodynamics: Uneventful            Sign Out: Acceptable CV status; No obvious hypovolemia; No obvious fluid overload   Other NRE: NONE   DID A NON-ROUTINE EVENT OCCUR? No           Last vitals:  Vitals Value Taken Time   /65 03/18/25 1715   Temp 36.6  C (97.9  F) 03/18/25 1655   Pulse 66 03/18/25 1723   Resp 12 03/18/25 1723   SpO2 100 % 03/18/25 1723   Vitals shown include unfiled device data.    Electronically Signed By: FAMILIA RODRIGUEZ MD  March 18, 2025  5:24 PM

## 2025-03-18 NOTE — ANESTHESIA PREPROCEDURE EVALUATION
Anesthesia Pre-Procedure Evaluation    Patient: Dunia Corley   MRN: [id number] : 2000        Procedure : Procedure(s):  EXCISION, LESION, LIP          History reviewed. No pertinent past medical history.   Past Surgical History:   Procedure Laterality Date     EXCISE LESION LIP Right 10/29/2024    Procedure: EXCISION, LESION, LIP;  Surgeon: Mandi David MD;  Location: [facility]     INCISION AND DRAINAGE NECK, COMBINED Left 2024    Procedure: Incision and drainage neck, combined;  Surgeon: Juvencio Miner DDS;  Location: [facility]     IRRIGATION AND DEBRIDEMENT MANDIBLE, COMBINED Left 2024    Procedure: Irrigation and debridement, combined, of left mandible;  Surgeon: Mandi David MD;  [facility]     OPEN REDUCTION INTERNAL FIXATION MANDIBLE Left 2024    Procedure: Open reduction internal fixation Left Mandable Angle Fracture, Application of Maxillodibullar Fixation, Irrigation and debridement of facial abscess via intra and extra oral approaches, Extraction of teeth #16 and #17;  Surgeon: Juvencio Miner DDS;  Location: [facility] OR      No Known Allergies   Social History     Tobacco Use     Smoking status: Former     Types: Vaping Device     Passive exposure: Past     Smokeless tobacco: Never   Substance Use Topics     Alcohol use: Yes     Comment: Infrequently      Wt Readings from Last 1 Encounters:   25 63 kg (138 lb 14.2 oz)        Anesthesia Evaluation   Pt has had prior anesthetic. Type: General.        ROS/MED HX  ENT/Pulmonary:  - neg pulmonary ROS     Neurologic:  - neg neurologic ROS     Cardiovascular:  - neg cardiovascular ROS     METS/Exercise Tolerance:     Hematologic:  - neg hematologic  ROS     Musculoskeletal:  - neg musculoskeletal ROS     GI/Hepatic:  - neg GI/hepatic ROS     Renal/Genitourinary:  - neg Renal ROS     Endo:  - neg endo ROS     Psychiatric/Substance Use:     (+)     Recreational drug usage: Cannabis (Occasional. Last used 3 days ago).    Infectious  Disease:  - neg infectious disease ROS     Malignancy:  - neg malignancy ROS     Other:            Physical Exam    Airway        Mallampati: II   TM distance: > 3 FB   Neck ROM: full   Mouth opening: > 3 cm    Respiratory Devices and Support         Dental       (+) Completely normal teeth      Cardiovascular   cardiovascular exam normal          Pulmonary   pulmonary exam normal              OUTSIDE LABS:  CBC:   Lab Results   Component Value Date    WBC 5.0 03/13/2025    WBC 8.6 10/04/2024    HGB 11.5 (L) 03/13/2025    HGB 11.8 10/04/2024    HCT 35.0 03/13/2025    HCT 36.5 10/04/2024     03/13/2025     10/04/2024     BMP:   Lab Results   Component Value Date     10/04/2024     09/29/2024    POTASSIUM 4.1 10/04/2024    POTASSIUM 3.5 09/29/2024    CHLORIDE 102 10/04/2024    CHLORIDE 103 09/29/2024    CO2 26 10/04/2024    CO2 26 09/29/2024    BUN 10.7 10/04/2024    BUN 12.0 09/29/2024    CR 0.69 10/04/2024    CR 0.52 09/29/2024    GLC 84 10/04/2024     (H) 09/29/2024     COAGS:   Lab Results   Component Value Date    PTT 36 09/21/2024    INR 1.10 09/21/2024     POC:   Lab Results   Component Value Date    HCG Negative 03/13/2025    HCGS Negative 09/21/2024     HEPATIC:   Lab Results   Component Value Date    ALBUMIN 4.4 10/04/2024    PROTTOTAL 7.5 10/04/2024    ALT 15 10/04/2024    AST 20 10/04/2024    ALKPHOS 99 10/04/2024    BILITOTAL 0.4 10/04/2024     OTHER:   Lab Results   Component Value Date    LACT 0.9 09/22/2024    A1C 5.5 09/24/2024    EUGENE 9.6 10/04/2024    TSH 1.84 07/09/2018       Anesthesia Plan    ASA Status:  1    NPO Status:  NPO Appropriate    Anesthesia Type: General.     - Airway: ETT   Induction: Intravenous.   Maintenance: Inhalation.        Consents    Anesthesia Plan(s) and associated risks, benefits, and realistic alternatives discussed. Questions answered and patient/representative(s) expressed understanding.     - Discussed: Risks, Benefits and Alternatives  for BOTH SEDATION and the PROCEDURE were discussed     - Discussed with:  Patient      - Extended Intubation/Ventilatory Support Discussed: No.      - Patient is DNR/DNI Status: No     Use of blood products discussed: No .     Postoperative Care    Pain management: Multi-modal analgesia.   PONV prophylaxis: Ondansetron (or other 5HT-3), Dexamethasone or Solumedrol     Comments:               Thao Weldon MD    I have reviewed the pertinent notes and labs in the chart from the past 30 days and (re)examined the patient.  Any updates or changes from those notes are reflected in this note.    Clinically Significant Risk Factors Present on Admission                                 # Financial/Environmental Concerns:

## 2025-03-18 NOTE — ANESTHESIA CARE TRANSFER NOTE
Patient: Dunia Corley    Procedure: Procedure(s):  EXCISION, LESION, RIGHT UPPER LIP       Diagnosis: Lesion of lip [K13.0]  Diagnosis Additional Information: No value filed.    Anesthesia Type:   General     Note:    Oropharynx: oropharynx clear of all foreign objects and spontaneously breathing  Level of Consciousness: awake  Oxygen Supplementation: nasal cannula  Level of Supplemental Oxygen (L/min / FiO2): 2  Independent Airway: airway patency satisfactory and stable  Dentition: dentition unchanged  Vital Signs Stable: post-procedure vital signs reviewed and stable  Report to RN Given: handoff report given  Patient transferred to: PACU    Handoff Report: Identifed the Patient, Identified the Reponsible Provider, Reviewed the pertinent medical history, Discussed the surgical course, Reviewed Intra-OP anesthesia mangement and issues during anesthesia, Set expectations for post-procedure period and Allowed opportunity for questions and acknowledgement of understanding      Vitals:  Vitals Value Taken Time   /66 03/18/25 1700   Temp     Pulse 69 03/18/25 1701   Resp 16 03/18/25 1701   SpO2 99 % 03/18/25 1701   Vitals shown include unfiled device data.    Electronically Signed By: JOSE ENRIQUE Downs CRNA  March 18, 2025  5:02 PM

## 2025-03-18 NOTE — BRIEF OP NOTE
[facility]    Brief Operative Note    Pre-operative diagnosis: Lesion of lip [K13.0]  Post-operative diagnosis Same as pre-operative diagnosis    Procedure: EXCISION, LESION, RIGHT UPPER LIP, Right - Face    Surgeon: Surgeons and Role:     * Mandi David MD - Primary     * Margarette Rouse DDS - Resident - Assisting  Anesthesia: General   Estimated Blood Loss: Minimal    Drains: None  Specimens:   ID Type Source Tests Collected by Time Destination   1 : right upper lip lesion Tissue Other SURGICAL PATHOLOGY EXAM Mandi David MD 3/18/2025  4:18 PM      Findings:   None.  Complications: None.  Implants: * No implants in log *

## 2025-05-21 ENCOUNTER — OFFICE VISIT (OUTPATIENT)
Dept: INTERNAL MEDICINE | Facility: CLINIC | Age: 25
End: 2025-05-21
Payer: COMMERCIAL

## 2025-05-21 VITALS
DIASTOLIC BLOOD PRESSURE: 76 MMHG | HEIGHT: 63 IN | RESPIRATION RATE: 16 BRPM | BODY MASS INDEX: 24.8 KG/M2 | HEART RATE: 59 BPM | TEMPERATURE: 97.8 F | WEIGHT: 140 LBS | OXYGEN SATURATION: 97 % | SYSTOLIC BLOOD PRESSURE: 112 MMHG

## 2025-05-21 DIAGNOSIS — B07.0 PLANTAR WARTS: Primary | ICD-10-CM

## 2025-05-21 DIAGNOSIS — D50.9 IRON DEFICIENCY ANEMIA, UNSPECIFIED IRON DEFICIENCY ANEMIA TYPE: ICD-10-CM

## 2025-05-21 DIAGNOSIS — F33.0 MILD RECURRENT MAJOR DEPRESSION: ICD-10-CM

## 2025-05-21 PROCEDURE — 99214 OFFICE O/P EST MOD 30 MIN: CPT | Performed by: NURSE PRACTITIONER

## 2025-05-21 PROCEDURE — 3078F DIAST BP <80 MM HG: CPT | Performed by: NURSE PRACTITIONER

## 2025-05-21 PROCEDURE — 3074F SYST BP LT 130 MM HG: CPT | Performed by: NURSE PRACTITIONER

## 2025-05-21 PROCEDURE — G2211 COMPLEX E/M VISIT ADD ON: HCPCS | Performed by: NURSE PRACTITIONER

## 2025-05-21 ASSESSMENT — PATIENT HEALTH QUESTIONNAIRE - PHQ9
10. IF YOU CHECKED OFF ANY PROBLEMS, HOW DIFFICULT HAVE THESE PROBLEMS MADE IT FOR YOU TO DO YOUR WORK, TAKE CARE OF THINGS AT HOME, OR GET ALONG WITH OTHER PEOPLE: SOMEWHAT DIFFICULT
SUM OF ALL RESPONSES TO PHQ QUESTIONS 1-9: 9
SUM OF ALL RESPONSES TO PHQ QUESTIONS 1-9: 9

## 2025-05-21 NOTE — PROGRESS NOTES
Assessment & Plan     Plantar warts  Patient has failed previous freezing treatments and even topical treatments.  She would like a referral to podiatry to discuss possible surgical removal.  Referral was placed at this time.  - Orthopedic  Referral; Future    Mild recurrent major depression  PHQ-9 of 9 today.  She states overall she is doing much better and is living full-time with her mom.  She denies any desire to start medications for her depression.  She states she feels like she is continuing to improve overall.  Will follow-up when I see her again for annual exam this fall.    Iron deficiency anemia, unspecified iron deficiency anemia type  Continues on iron supplements.  States she takes this most days but some days she does forget.  Denies any symptoms to suggest worsening anemia at this time.  We discussed we can recheck her CBC when I see her for annual exam.    The longitudinal plan of care for the diagnosis(es)/condition(s) as documented were addressed during this visit. Due to the added complexity in care, I will continue to support Kina in the subsequent management and with ongoing continuity of care.        Miri Castanon is a 25 year old, presenting for the following health issues:  Derm Problem (Wart removal on foot)      5/21/2025     2:19 PM   Additional Questions   Roomed by Veronica ADAMS     History of Present Illness       Reason for visit:  Wart removal    She eats 0-1 servings of fruits and vegetables daily.She consumes 4 sweetened beverage(s) daily.She exercises with enough effort to increase her heart rate 9 or less minutes per day.  She exercises with enough effort to increase her heart rate 3 or less days per week. She is missing 2 dose(s) of medications per week.  She is not taking prescribed medications regularly due to remembering to take.      Olinda is a pleasant 25-year-old female here today to follow-up on a wart.  She previously has had a few liquid nitrogen treatments  "but has been more recently doing.  Topical Peridex treatment without any improvement in her wart symptoms.  She is interested in discussing further options with podiatry.          ROS  Comprehensive 12-point review of systems was completed and negative except as noted in HPI.        Objective    /76 (BP Location: Right arm, Patient Position: Sitting)   Pulse 59   Temp 97.8  F (36.6  C)   Resp 16   Ht 1.6 m (5' 3\")   Wt 63.5 kg (140 lb)   LMP 04/30/2025 (Exact Date)   SpO2 97%   BMI 24.80 kg/m    Body mass index is 24.8 kg/m .  Physical Exam   Constitutional: In no acute distress.  Clean appearance.  Cardiovascular: Regular rate.  Respiratory: Normal respiratory effort.  Skin: Skin is pink, warm and dry.     Musculoskeletal: Gait normal.  Plantar aspect of the right foot with a large single wart noted in the center of the forefront of the foot.  Psychiatric: Appropriate affect and demeanor.  Memory intact.  Good insight and judgment.  Neurologic: No tremor or involuntary movement noted.              Signed Electronically by: [place] NP    "

## 2025-05-22 ENCOUNTER — PATIENT OUTREACH (OUTPATIENT)
Dept: CARE COORDINATION | Facility: CLINIC | Age: 25
End: 2025-05-22
Payer: COMMERCIAL

## 2025-05-26 ENCOUNTER — PATIENT OUTREACH (OUTPATIENT)
Dept: CARE COORDINATION | Facility: CLINIC | Age: 25
End: 2025-05-26
Payer: COMMERCIAL

## 2025-07-15 ENCOUNTER — OFFICE VISIT (OUTPATIENT)
Dept: PODIATRY | Facility: CLINIC | Age: 25
End: 2025-07-15
Payer: COMMERCIAL

## 2025-07-15 VITALS — HEIGHT: 64 IN | BODY MASS INDEX: 23.05 KG/M2 | WEIGHT: 135 LBS

## 2025-07-15 DIAGNOSIS — B07.0 PLANTAR VERRUCA: Primary | ICD-10-CM

## 2025-07-15 PROCEDURE — 17110 DESTRUCTION B9 LES UP TO 14: CPT | Performed by: STUDENT IN AN ORGANIZED HEALTH CARE EDUCATION/TRAINING PROGRAM

## 2025-07-15 RX ORDER — CETIRIZINE HYDROCHLORIDE 10 MG/1
10 TABLET ORAL DAILY
COMMUNITY
Start: 2025-07-01 | End: 2025-07-31

## 2025-07-15 RX ORDER — FLUTICASONE PROPIONATE 50 MCG
2 SPRAY, SUSPENSION (ML) NASAL
COMMUNITY
Start: 2025-07-01

## 2025-07-15 NOTE — LETTER
7/15/2025      Dnuia Corley  [address] [place] [address]      Dear Colleague,    Thank you for referring your patient, Dunia Corley, to the [facility]. Please see a copy of my visit note below.    CC: Painful lesion, right foot     HPI: Dunia Corley is a 25 year old female who presents to clinic for chief concern of painful lesion to the bottom of to their right foot.  She states that she has had this removed before and frozen before but it does not change.  She is wondering what she can do about this as she is getting very frustrated with them.  She states she has had warts since she was a kid.    Past Surgical History:   Procedure Laterality Date     CL AFF SURGICAL PATHOLOGY Right 03/18/2025     EXCISE LESION LIP Right 10/29/2024    Procedure: EXCISION, LESION, LIP;  Surgeon: Mandi David MD;  Location: [facility] OR     EXCISE LESION LIP Right 3/18/2025    Procedure: EXCISION, LESION, RIGHT UPPER LIP;  Surgeon: Mandi David MD;  Location: [facility]     INCISION AND DRAINAGE NECK, COMBINED Left 09/22/2024    Procedure: Incision and drainage neck, combined;  Surgeon: Juvencio Miner DDS;  [facility]     IRRIGATION AND DEBRIDEMENT MANDIBLE, COMBINED Left 09/28/2024    Procedure: Irrigation and debridement, combined, of left mandible;  Surgeon: Mandi David MD;  [facility]     OPEN REDUCTION INTERNAL FIXATION MANDIBLE Left 09/22/2024    Procedure: Open reduction internal fixation Left Mandable Angle Fracture, Application of Maxillodibullar Fixation, Irrigation and debridement of facial abscess via intra and extra oral approaches, Extraction of teeth #16 and #17;  Surgeon: Juvencio Miner DDS;  Location[facility] OR     No past medical history on file.  Medications:  Current Outpatient Medications   Medication Sig Dispense Refill     fluticasone (FLONASE) 50 MCG/ACT nasal spray 2 sprays.       chlorhexidine (PERIDEX) 0.12 % solution Swish and spit 15 mLs in mouth  2 times daily. 473 mL 0     ferrous sulfate (FEROSUL) 325 (65 Fe) MG tablet Take one tab every 3 days. 90 tablet 1     Allergies:  Patient has no known allergies.  Social History     Socioeconomic History     Marital status: Single     Spouse name: Not on file     Number of children: Not on file     Years of education: Not on file     Highest education level: Not on file   Occupational History     Not on file   Tobacco Use     Smoking status: Former     Types: Vaping Device     Passive exposure: Past     Smokeless tobacco: Never   Vaping Use     Vaping status: Former   Substance and Sexual Activity     Alcohol use: Yes     Comment: Infrequently     Drug use: Not Currently     Types: Cocaine, Marijuana, Oxycodone, Benzodiazepines, MDMA (Ecstasy), Amphetamines, Opiates, LSD     Comment: Drug use: Current Xanax, history of cocaine, LSD, percocet, and marijuana use     Sexual activity: Yes     Partners: Male   Other Topics Concern     Parent/sibling w/ CABG, MI or angioplasty before 65F 55M? Not Asked   Social History Narrative    Unemployed.      Social Drivers of Health     Financial Resource Strain: Low Risk  (10/4/2024)    Financial Resource Strain      Within the past 12 months, have you or your family members you live with been unable to get utilities (heat, electricity) when it was really needed?: No   Food Insecurity: High Risk (10/4/2024)    Food Insecurity      Within the past 12 months, did you worry that your food would run out before you got money to buy more?: Yes      Within the past 12 months, did the food you bought just not last and you didn t have money to get more?: Yes   Transportation Needs: High Risk (10/4/2024)    Transportation Needs      Within the past 12 months, has lack of transportation kept you from medical appointments, getting your medicines, non-medical meetings or appointments, work, or from getting things that you need?: Yes   Physical Activity: Not on file   Stress: Not on file  "  Social Connections: Not on file   Interpersonal Safety: Low Risk  (3/18/2025)    Interpersonal Safety      Do you feel physically and emotionally safe where you currently live?: Yes      Within the past 12 months, have you been hit, slapped, kicked or otherwise physically hurt by someone?: No      Within the past 12 months, have you been humiliated or emotionally abused in other ways by your partner or ex-partner?: No   Housing Stability: High Risk (10/4/2024)    Housing Stability      Do you have housing? : No      Are you worried about losing your housing?: Yes     Family History   Problem Relation Age of Onset     Substance Abuse Mother      Substance Abuse Father      Substance Abuse Maternal Aunt      Substance Abuse Paternal Uncle        Medical records were reviewed and are summarized above.    Review of Systems    PHYSICAL EXAM:  Vital signs:Ht 1.626 m (5' 4\")   Wt 61.2 kg (135 lb)   LMP 04/30/2025 (Exact Date)   BMI 23.17 kg/m       Focused lower extremity physical exam:     Derm: Skin is warm, dry, intact.  Callusing noted to the plantar aspect of the third metatarsal head, right foot.  Upon trimming of the overlying callusing tissue multiple black lesions were noted with petechial bleeding.  No skin lines noted to this level of lesion.  6 lesions noted in total.  Nails are well trimmed. No ecchymosis or erythema noted.     Vasc: Dorsalis pedis pulses, 2/4 bilateral. Posterior tibial pulses 2/4 bilateral. Cap fill time < 3 seconds to the digits. No edema is present. Hair growth present to the toes.     Neuro: Protective sensation intact via light touch to the feet. Gross sensation intact.     MSK:   - Tenderness to palpation of the lesions noted to the plantar aspect of the third metatarsal head, right foot.  - Muscle strength 5/5 with dorsiflexion, plantarflexion, eversion, inversion of the feet. Compartments soft and compressible.    Assessment:   - Plantar verruca, right foot    Plan:  - Patient " was seen and evaluated in clinic by myself.   - Plantar verruca, right foot:  Discussed plantar verruca versus porokeratosis.  Discussed warty migration through the layers of skin.  Discussed excision versus extraction of the lesion in clinic versus in the OR.  Discussed salicylic acid and the effect this has on skin and callusing.  Discussed applying salicylic acid daily and removing the sloughing skin with a pumice stone for 2 weeks.  Patient will get the salicylic acid and perform this daily.  Patient would like to move forward with destruction of the lesion in clinic today.    - Procedure: Destruction of benign lesion x 6:  After verbal and written consent were obtained, the area plantar verruca was trimmed and all hyperkeratotic rim was removed.  This was brought down to a petechial bleeding base.  Freezing application for destruction of lesion was applied for 40 seconds.  40 additional seconds were allowed with the lesion remaining untouched.  Dry sterile dressing was placed to the lesion afterwards.  Patient tolerated the procedure well.    - Patient to follow-up in 2 weeks or sooner should problems arise.    Wilver Pires DPM    Again, thank you for allowing me to participate in the care of your patient.        Sincerely,        Wilver Pires DPM    Electronically signed

## 2025-07-15 NOTE — PROGRESS NOTES
CC: Painful lesion, right foot     HPI: Dunia Corely is a 25 year old female who presents to clinic for chief concern of painful lesion to the bottom of to their right foot.  She states that she has had this removed before and frozen before but it does not change.  She is wondering what she can do about this as she is getting very frustrated with them.  She states she has had warts since she was a kid.    Past Surgical History:   Procedure Laterality Date    CL AFF SURGICAL PATHOLOGY Right 03/18/2025    EXCISE LESION LIP Right 10/29/2024    Procedure: EXCISION, LESION, LIP;  Surgeon: Mandi David MD;  [facility]: [facility]    EXCISE LESION LIP Right 3/18/2025    Procedure: EXCISION, LESION, RIGHT UPPER LIP;  Surgeon: Mandi David MD;  Location: [facility]    INCISION AND DRAINAGE NECK, COMBINED Left 09/22/2024    Procedure: Incision and drainage neck, combined;  Surgeon: Juvencio Miner DDS;  [facility]    IRRIGATION AND DEBRIDEMENT MANDIBLE, COMBINED Left 09/28/2024    Procedure: Irrigation and debridement, combined, of left mandible;  Surgeon: Mandi David MD;  [facility]    OPEN REDUCTION INTERNAL FIXATION MANDIBLE Left 09/22/2024    Procedure: Open reduction internal fixation Left Mandable Angle Fracture, Application of Maxillodibullar Fixation, Irrigation and debridement of facial abscess via intra and extra oral approaches, Extraction of teeth #16 and #17;  Surgeon: Juvencio Miner DDS;  [facility]     No past medical history on file.  Medications:  Current Outpatient Medications   Medication Sig Dispense Refill    fluticasone (FLONASE) 50 MCG/ACT nasal spray 2 sprays.      chlorhexidine (PERIDEX) 0.12 % solution Swish and spit 15 mLs in mouth 2 times daily. 473 mL 0    ferrous sulfate (FEROSUL) 325 (65 Fe) MG tablet Take one tab every 3 days. 90 tablet 1     Allergies:  Patient has no known allergies.  Social History     Socioeconomic History    Marital status: Single     Spouse name: Not on  file    Number of children: Not on file    Years of education: Not on file    Highest education level: Not on file   Occupational History    Not on file   Tobacco Use    Smoking status: Former     Types: Vaping Device     Passive exposure: Past    Smokeless tobacco: Never   Vaping Use    Vaping status: Former   Substance and Sexual Activity    Alcohol use: Yes     Comment: Infrequently    Drug use: Not Currently     Types: Cocaine, Marijuana, Oxycodone, Benzodiazepines, MDMA (Ecstasy), Amphetamines, Opiates, LSD     Comment: Drug use: Current Xanax, history of cocaine, LSD, percocet, and marijuana use    Sexual activity: Yes     Partners: Male   Other Topics Concern    Parent/sibling w/ CABG, MI or angioplasty before 65F 55M? Not Asked   Social History Narrative    Unemployed.      Social Drivers of Health     Financial Resource Strain: Low Risk  (10/4/2024)    Financial Resource Strain     Within the past 12 months, have you or your family members you live with been unable to get utilities (heat, electricity) when it was really needed?: No   Food Insecurity: High Risk (10/4/2024)    Food Insecurity     Within the past 12 months, did you worry that your food would run out before you got money to buy more?: Yes     Within the past 12 months, did the food you bought just not last and you didn t have money to get more?: Yes   Transportation Needs: High Risk (10/4/2024)    Transportation Needs     Within the past 12 months, has lack of transportation kept you from medical appointments, getting your medicines, non-medical meetings or appointments, work, or from getting things that you need?: Yes   Physical Activity: Not on file   Stress: Not on file   Social Connections: Not on file   Interpersonal Safety: Low Risk  (3/18/2025)    Interpersonal Safety     Do you feel physically and emotionally safe where you currently live?: Yes     Within the past 12 months, have you been hit, slapped, kicked or otherwise physically  "hurt by someone?: No     Within the past 12 months, have you been humiliated or emotionally abused in other ways by your partner or ex-partner?: No   Housing Stability: High Risk (10/4/2024)    Housing Stability     Do you have housing? : No     Are you worried about losing your housing?: Yes     Family History   Problem Relation Age of Onset    Substance Abuse Mother     Substance Abuse Father     Substance Abuse Maternal Aunt     Substance Abuse Paternal Uncle        Medical records were reviewed and are summarized above.    Review of Systems    PHYSICAL EXAM:  Vital signs:Ht 1.626 m (5' 4\")   Wt 61.2 kg (135 lb)   LMP 04/30/2025 (Exact Date)   BMI 23.17 kg/m       Focused lower extremity physical exam:     Derm: Skin is warm, dry, intact.  Callusing noted to the plantar aspect of the third metatarsal head, right foot.  Upon trimming of the overlying callusing tissue multiple black lesions were noted with petechial bleeding.  No skin lines noted to this level of lesion.  6 lesions noted in total.  Nails are well trimmed. No ecchymosis or erythema noted.     Vasc: Dorsalis pedis pulses, 2/4 bilateral. Posterior tibial pulses 2/4 bilateral. Cap fill time < 3 seconds to the digits. No edema is present. Hair growth present to the toes.     Neuro: Protective sensation intact via light touch to the feet. Gross sensation intact.     MSK:   - Tenderness to palpation of the lesions noted to the plantar aspect of the third metatarsal head, right foot.  - Muscle strength 5/5 with dorsiflexion, plantarflexion, eversion, inversion of the feet. Compartments soft and compressible.    Assessment:   - Plantar verruca, right foot    Plan:  - Patient was seen and evaluated in clinic by myself.   - Plantar verruca, right foot:  Discussed plantar verruca versus porokeratosis.  Discussed warty migration through the layers of skin.  Discussed excision versus extraction of the lesion in clinic versus in the OR.  Discussed salicylic " acid and the effect this has on skin and callusing.  Discussed applying salicylic acid daily and removing the sloughing skin with a pumice stone for 2 weeks.  Patient will get the salicylic acid and perform this daily.  Patient would like to move forward with destruction of the lesion in clinic today.    - Procedure: Destruction of benign lesion x 6:  After verbal and written consent were obtained, the area plantar verruca was trimmed and all hyperkeratotic rim was removed.  This was brought down to a petechial bleeding base.  Freezing application for destruction of lesion was applied for 40 seconds.  40 additional seconds were allowed with the lesion remaining untouched.  Dry sterile dressing was placed to the lesion afterwards.  Patient tolerated the procedure well.    - Patient to follow-up in 2 weeks or sooner should problems arise.    Wilver Pires DPM

## 2025-07-29 ENCOUNTER — OFFICE VISIT (OUTPATIENT)
Dept: PODIATRY | Facility: CLINIC | Age: 25
End: 2025-07-29
Payer: COMMERCIAL

## 2025-07-29 DIAGNOSIS — B07.0 PLANTAR VERRUCA: Primary | ICD-10-CM

## 2025-07-29 PROCEDURE — 1125F AMNT PAIN NOTED PAIN PRSNT: CPT | Performed by: STUDENT IN AN ORGANIZED HEALTH CARE EDUCATION/TRAINING PROGRAM

## 2025-07-29 PROCEDURE — 17110 DESTRUCTION B9 LES UP TO 14: CPT | Performed by: STUDENT IN AN ORGANIZED HEALTH CARE EDUCATION/TRAINING PROGRAM

## 2025-07-29 ASSESSMENT — PAIN SCALES - GENERAL: PAINLEVEL_OUTOF10: MODERATE PAIN (6)

## 2025-07-29 NOTE — LETTER
7/29/2025      Dunia Corley  [address] [place] [address]      Dear Colleague,    Thank you for referring your patient, Dunia Corley, to the [facility]. Please see a copy of my visit note below.        FOOT AND ANKLE SURGERY/PODIATRY PROGRESS NOTE    ASSESSMENT:   - Plantar verruca, right foot:    PLAN:  - Plantar verruca, right foot:  Discussed applying salicylic acid daily and removing the sloughing skin with a pumice stone for 2 weeks.  Patient will get the salicylic acid and perform this daily.  Patient would like to continue with destruction of the lesion in clinic today.     - Procedure: Destruction of benign lesion x 4:  After verbal and written consent were obtained, the area plantar verruca was trimmed and all hyperkeratotic rim was removed.  This was brought down to a petechial bleeding base.  Freezing application for destruction of lesion was applied for 40 seconds.  40 additional seconds were allowed with the lesion remaining untouched.  Dry sterile dressing was placed to the lesion afterwards.  Patient tolerated the procedure well.    - Patient's questions invited and answered. Patient to return to clinic in 2 week(s) for re-evaluation. Patient was encouraged to call my office with any further questions or concerns.     Elkin Pires, FAISAL  [facility] Podiatry/Foot & Ankle Surgery      SUBJECTIVE: Dunia Corley was seen in clinic today for continued evaluation of plantar verruca to her right foot.  She has been doing salicylic acid and states she only missed 2 days worth of salicylic acid treatments.  She states that she believes it is becoming much more superficial but that it is rather tender when the callus builds up.  Denies any other pedal concerns.    No past medical history on file.    Past Surgical History:   Procedure Laterality Date     CL AFF SURGICAL PATHOLOGY Right 03/18/2025     EXCISE LESION LIP Right 10/29/2024    Procedure:  EXCISION, LESION, LIP;  Surgeon: Mandi David MD;  [facility]: [facility]     EXCISE LESION LIP Right 3/18/2025    Procedure: EXCISION, LESION, RIGHT UPPER LIP;  Surgeon: Mandi David MD;  [facility]     INCISION AND DRAINAGE NECK, COMBINED Left 09/22/2024    Procedure: Incision and drainage neck, combined;  Surgeon: Juvencio Miner DDS;  [facility]     IRRIGATION AND DEBRIDEMENT MANDIBLE, COMBINED Left 09/28/2024    Procedure: Irrigation and debridement, combined, of left mandible;  Surgeon: Mandi David MD;  [facility]     OPEN REDUCTION INTERNAL FIXATION MANDIBLE Left 09/22/2024    Procedure: Open reduction internal fixation Left Mandable Angle Fracture, Application of Maxillodibullar Fixation, Irrigation and debridement of facial abscess via intra and extra oral approaches, Extraction of teeth #16 and #17;  Surgeon: Juvencio Miner DDS;  [facility]       No Known Allergies      Current Outpatient Medications:      cetirizine (ZYRTEC) 10 MG tablet, Take 10 mg by mouth daily., Disp: , Rfl:      chlorhexidine (PERIDEX) 0.12 % solution, Swish and spit 15 mLs in mouth 2 times daily., Disp: 473 mL, Rfl: 0     ferrous sulfate (FEROSUL) 325 (65 Fe) MG tablet, Take one tab every 3 days., Disp: 90 tablet, Rfl: 1     fluticasone (FLONASE) 50 MCG/ACT nasal spray, 2 sprays., Disp: , Rfl:     Family History   Problem Relation Age of Onset     Substance Abuse Mother      Substance Abuse Father      Substance Abuse Maternal Aunt      Substance Abuse Paternal Uncle        Social History     Socioeconomic History     Marital status: Single     Spouse name: Not on file     Number of children: Not on file     Years of education: Not on file     Highest education level: Not on file   Occupational History     Not on file   Tobacco Use     Smoking status: Former     Types: Vaping Device     Passive exposure: Past     Smokeless tobacco: Never   Vaping Use     Vaping status: Former   Substance and Sexual Activity      Alcohol use: Yes     Comment: Infrequently     Drug use: Not Currently     Types: Cocaine, Marijuana, Oxycodone, Benzodiazepines, MDMA (Ecstasy), Amphetamines, Opiates, LSD     Comment: Drug use: Current Xanax, history of cocaine, LSD, percocet, and marijuana use     Sexual activity: Yes     Partners: Male   Other Topics Concern     Parent/sibling w/ CABG, MI or angioplasty before 65F 55M? Not Asked   Social History Narrative    Unemployed.      Social Drivers of Health     Financial Resource Strain: Low Risk  (10/4/2024)    Financial Resource Strain      Within the past 12 months, have you or your family members you live with been unable to get utilities (heat, electricity) when it was really needed?: No   Food Insecurity: High Risk (10/4/2024)    Food Insecurity      Within the past 12 months, did you worry that your food would run out before you got money to buy more?: Yes      Within the past 12 months, did the food you bought just not last and you didn t have money to get more?: Yes   Transportation Needs: High Risk (10/4/2024)    Transportation Needs      Within the past 12 months, has lack of transportation kept you from medical appointments, getting your medicines, non-medical meetings or appointments, work, or from getting things that you need?: Yes   Physical Activity: Not on file   Stress: Not on file   Social Connections: Not on file   Interpersonal Safety: Low Risk  (3/18/2025)    Interpersonal Safety      Do you feel physically and emotionally safe where you currently live?: Yes      Within the past 12 months, have you been hit, slapped, kicked or otherwise physically hurt by someone?: No      Within the past 12 months, have you been humiliated or emotionally abused in other ways by your partner or ex-partner?: No   Housing Stability: High Risk (10/4/2024)    Housing Stability      Do you have housing? : No      Are you worried about losing your housing?: Yes       10 point Review of Systems is  negative except otherwise noted in HPI.    OBJECTIVE:  Derm: Skin is warm, dry, intact.  Callusing noted to the plantar aspect of the third metatarsal head, right foot.  Upon trimming of the overlying callusing tissue multiple black lesions were noted with petechial bleeding.  No skin lines noted to this level of lesion.  4 lesions noted in total.  Nails are well trimmed. No ecchymosis or erythema noted.      Vasc: Dorsalis pedis pulses, 2/4 bilateral. Posterior tibial pulses 2/4 bilateral. Cap fill time < 3 seconds to the digits. No edema is present. Hair growth present to the toes.      Neuro: Protective sensation intact via light touch to the feet. Gross sensation intact.      MSK:   - Tenderness to palpation of the lesions noted to the plantar aspect of the third metatarsal head, right foot.  - Muscle strength 5/5 with dorsiflexion, plantarflexion, eversion, inversion of the feet. Compartments soft and compressible.      Again, thank you for allowing me to participate in the care of your patient.        Sincerely,        Wilver Pires DPM    Electronically signed

## 2025-07-29 NOTE — PROGRESS NOTES
FOOT AND ANKLE SURGERY/PODIATRY PROGRESS NOTE    ASSESSMENT:   - Plantar verruca, right foot:    PLAN:  - Plantar verruca, right foot:  Discussed applying salicylic acid daily and removing the sloughing skin with a pumice stone for 2 weeks.  Patient will get the salicylic acid and perform this daily.  Patient would like to continue with destruction of the lesion in clinic today.     - Procedure: Destruction of benign lesion x 4:  After verbal and written consent were obtained, the area plantar verruca was trimmed and all hyperkeratotic rim was removed.  This was brought down to a petechial bleeding base.  Freezing application for destruction of lesion was applied for 40 seconds.  40 additional seconds were allowed with the lesion remaining untouched.  Dry sterile dressing was placed to the lesion afterwards.  Patient tolerated the procedure well.    - Patient's questions invited and answered. Patient to return to clinic in 2 week(s) for re-evaluation. Patient was encouraged to call my office with any further questions or concerns.     Elkin Pires DPM  [facility] Podiatry/Foot & Ankle Surgery      SUBJECTIVE: Dunia Corley was seen in clinic today for continued evaluation of plantar verruca to her right foot.  She has been doing salicylic acid and states she only missed 2 days worth of salicylic acid treatments.  She states that she believes it is becoming much more superficial but that it is rather tender when the callus builds up.  Denies any other pedal concerns.    No past medical history on file.    Past Surgical History:   Procedure Laterality Date    CL AFF SURGICAL PATHOLOGY Right 03/18/2025    EXCISE LESION LIP Right 10/29/2024    Procedure: EXCISION, LESION, LIP;  Surgeon: Mandi David MD;  [facility]    EXCISE LESION LIP Right 3/18/2025    Procedure: EXCISION, LESION, RIGHT UPPER LIP;  Surgeon: Mandi David MD;  [facility]    INCISION AND DRAINAGE NECK, COMBINED Left  09/22/2024    Procedure: Incision and drainage neck, combined;  Surgeon: Juvencio Miner DDS;  [facility]    IRRIGATION AND DEBRIDEMENT MANDIBLE, COMBINED Left 09/28/2024    Procedure: Irrigation and debridement, combined, of left mandible;  Surgeon: Mandi David MD;  [facility]    OPEN REDUCTION INTERNAL FIXATION MANDIBLE Left 09/22/2024    Procedure: Open reduction internal fixation Left Mandable Angle Fracture, Application of Maxillodibullar Fixation, Irrigation and debridement of facial abscess via intra and extra oral approaches, Extraction of teeth #16 and #17;  Surgeon: Juvencio Miner DDS;  [facility]       No Known Allergies      Current Outpatient Medications:     cetirizine (ZYRTEC) 10 MG tablet, Take 10 mg by mouth daily., Disp: , Rfl:     chlorhexidine (PERIDEX) 0.12 % solution, Swish and spit 15 mLs in mouth 2 times daily., Disp: 473 mL, Rfl: 0    ferrous sulfate (FEROSUL) 325 (65 Fe) MG tablet, Take one tab every 3 days., Disp: 90 tablet, Rfl: 1    fluticasone (FLONASE) 50 MCG/ACT nasal spray, 2 sprays., Disp: , Rfl:     Family History   Problem Relation Age of Onset    Substance Abuse Mother     Substance Abuse Father     Substance Abuse Maternal Aunt     Substance Abuse Paternal Uncle        Social History     Socioeconomic History    Marital status: Single     Spouse name: Not on file    Number of children: Not on file    Years of education: Not on file    Highest education level: Not on file   Occupational History    Not on file   Tobacco Use    Smoking status: Former     Types: Vaping Device     Passive exposure: Past    Smokeless tobacco: Never   Vaping Use    Vaping status: Former   Substance and Sexual Activity    Alcohol use: Yes     Comment: Infrequently    Drug use: Not Currently     Types: Cocaine, Marijuana, Oxycodone, Benzodiazepines, MDMA (Ecstasy), Amphetamines, Opiates, LSD     Comment: Drug use: Current Xanax, history of cocaine, LSD, percocet, and marijuana use     Sexual activity: Yes     Partners: Male   Other Topics Concern    Parent/sibling w/ CABG, MI or angioplasty before 65F 55M? Not Asked   Social History Narrative    Unemployed.      Social Drivers of Health     Financial Resource Strain: Low Risk  (10/4/2024)    Financial Resource Strain     Within the past 12 months, have you or your family members you live with been unable to get utilities (heat, electricity) when it was really needed?: No   Food Insecurity: High Risk (10/4/2024)    Food Insecurity     Within the past 12 months, did you worry that your food would run out before you got money to buy more?: Yes     Within the past 12 months, did the food you bought just not last and you didn t have money to get more?: Yes   Transportation Needs: High Risk (10/4/2024)    Transportation Needs     Within the past 12 months, has lack of transportation kept you from medical appointments, getting your medicines, non-medical meetings or appointments, work, or from getting things that you need?: Yes   Physical Activity: Not on file   Stress: Not on file   Social Connections: Not on file   Interpersonal Safety: Low Risk  (3/18/2025)    Interpersonal Safety     Do you feel physically and emotionally safe where you currently live?: Yes     Within the past 12 months, have you been hit, slapped, kicked or otherwise physically hurt by someone?: No     Within the past 12 months, have you been humiliated or emotionally abused in other ways by your partner or ex-partner?: No   Housing Stability: High Risk (10/4/2024)    Housing Stability     Do you have housing? : No     Are you worried about losing your housing?: Yes       10 point Review of Systems is negative except otherwise noted in HPI.    OBJECTIVE:  Derm: Skin is warm, dry, intact.  Callusing noted to the plantar aspect of the third metatarsal head, right foot.  Upon trimming of the overlying callusing tissue multiple black lesions were noted with petechial bleeding.  No skin  lines noted to this level of lesion.  4 lesions noted in total.  Nails are well trimmed. No ecchymosis or erythema noted.      Vasc: Dorsalis pedis pulses, 2/4 bilateral. Posterior tibial pulses 2/4 bilateral. Cap fill time < 3 seconds to the digits. No edema is present. Hair growth present to the toes.      Neuro: Protective sensation intact via light touch to the feet. Gross sensation intact.      MSK:   - Tenderness to palpation of the lesions noted to the plantar aspect of the third metatarsal head, right foot.  - Muscle strength 5/5 with dorsiflexion, plantarflexion, eversion, inversion of the feet. Compartments soft and compressible.

## (undated) DEVICE — SPONGE KITTNER 30-101

## (undated) DEVICE — SU CHROMIC 4-0 M-1DA 744G

## (undated) DEVICE — LINEN ORTHO PACK 5446

## (undated) DEVICE — GLOVE BIOGEL PI SZ 8.5 40885

## (undated) DEVICE — Device

## (undated) DEVICE — SU CHROMIC 3-0 SH 27" G122H

## (undated) DEVICE — DRSG GAUZE 4X8" NON21842

## (undated) DEVICE — TRAY PREP DRY SKIN SCRUB 067

## (undated) DEVICE — ESU CORD BIPOLAR GREEN 10-4000

## (undated) DEVICE — LINEN TOWEL PACK X5 5464

## (undated) DEVICE — PREP POVIDONE-IODINE 10% SOLUTION 4OZ BOTTLE MDS093944

## (undated) DEVICE — PREP SKIN SCRUB TRAY 4461A

## (undated) DEVICE — TOOTHBRUSH ADULT NON STERILE MDS136850

## (undated) DEVICE — BLADE KNIFE SURG 15 371115

## (undated) DEVICE — MINOR SINGLE BASIN KIT CSR WRAPX2 7QT SSK3002

## (undated) DEVICE — DRILL BIT SYN 1.5X125MM W/08MM STOP J-LATCH

## (undated) DEVICE — PACK NEURO MINOR UMMC SNE32MNMU4

## (undated) DEVICE — DRAPE SHEET MED 44X70" 9355

## (undated) DEVICE — ESU ELEC NDL 1" COATED/INSULATED E1465

## (undated) DEVICE — STPL SKIN 35W ROTATING HEAD PRW35

## (undated) DEVICE — SU PLAIN FAST ABSORB 5-0 PC-1 18" 1915G

## (undated) DEVICE — SOL NACL 0.9% IRRIG 1000ML BOTTLE 2F7124

## (undated) DEVICE — OINTMENT ANTIBIOTIC BACITRACIN ZINC .9 G 1171

## (undated) DEVICE — GOWN XLG DISP 9545

## (undated) DEVICE — LINEN GOWN XLG 5407

## (undated) DEVICE — TUBING SUCTION MEDI-VAC 1/4"X20' N620A

## (undated) DEVICE — PAD CHUX UNDERPAD 30X36" P3036C

## (undated) DEVICE — DRSG TELFA 3X8" 1238

## (undated) DEVICE — ESU NDL COLORADO MICRO E1651

## (undated) DEVICE — NDL ANGIOCATH 22GA 1" 4050

## (undated) DEVICE — LINEN TOWEL PACK X30 5481

## (undated) DEVICE — SYR 10ML FINGER CONTROL W/O NDL 309695

## (undated) DEVICE — SYR EAR 3OZ BULB IRR STRL DISP BLU PVC 4173

## (undated) DEVICE — BRUSH SURGICAL SCRUB PLAIN STERILE 4454A

## (undated) DEVICE — GLOVE BIOGEL PI SZ 8.0 40880

## (undated) DEVICE — TAPE DURAPORE 2"X1.5YD SILK 1538S-2

## (undated) DEVICE — CUP AND LID 2PK 2OZ STERILE  SSK9006A

## (undated) DEVICE — POSITIONER ARMBOARD FOAM 1PAIR LF FP-ARMB1

## (undated) DEVICE — CONTAINER SPECIMEN 4OZ STERILE 17099

## (undated) DEVICE — TUBE CULTURE AEROBIC/ANAEROBIC W/O SWABS A.C.T.I.1. 12401

## (undated) DEVICE — SU VICRYL 4-0 PS-2 18" UND J496H

## (undated) DEVICE — ESU GROUND PAD ADULT W/CORD E7507

## (undated) DEVICE — BLADE KNIFE SURG 15C 371716

## (undated) DEVICE — PEN MARKING SKIN W/LABELS 31145918

## (undated) DEVICE — SU CHROMIC 3-0 RB-1 27" U204H

## (undated) DEVICE — PREP POVIDONE-IODINE 7.5% SCRUB 4OZ BOTTLE MDS093945

## (undated) DEVICE — TUBING SUCTION MEDI-VAC SOFT 3/16"X20' N520A

## (undated) DEVICE — SYR 50ML LL W/O NDL 309653

## (undated) DEVICE — DECANTER VIAL 2006S

## (undated) DEVICE — SU VICRYL 3-0 X-1 27" UND J458H

## (undated) DEVICE — STRAP UNIVERSAL POSITIONING 2-PIECE 4X47X76" 91-287

## (undated) DEVICE — SPONGE RAY-TEC 4X8" 7318

## (undated) DEVICE — TUBING SUCTION 10'X3/16" N510

## (undated) DEVICE — SUCTION FRAZIER 12FR W/HANDLE K73

## (undated) DEVICE — BLADE KNIFE SURG 11 371111

## (undated) DEVICE — STRAP KNEE/BODY 31143004

## (undated) DEVICE — APPLICATOR COTTON TIP 3" 9325

## (undated) DEVICE — DRAPE U SPLIT 74X120" 29440

## (undated) DEVICE — WIPES FOLEY CARE SURESTEP PROVON DFC100

## (undated) DEVICE — SU SILK 3-0 RB-1 CR 8X18" C053D

## (undated) DEVICE — SU PROLENE 5-0 P-3 18" 8698G

## (undated) DEVICE — SPECIMEN CONTAINER URINE 90ML STERILE 75.1435.002

## (undated) DEVICE — SUCTION MANIFOLD NEPTUNE 2 SYS 4 PORT 0702-020-000

## (undated) DEVICE — SU VICRYL 4-0 RB-1 27" UND J214H

## (undated) DEVICE — SYR 01ML 27GA 0.5" NDL TBC 309623

## (undated) DEVICE — NDL ANGIOCATH 18GA 1 3/4" 4054

## (undated) DEVICE — ESU GROUND PAD UNIVERSAL W/O CORD

## (undated) DEVICE — SOL WATER IRRIG 1000ML BOTTLE 2F7114

## (undated) DEVICE — BATTERY PACK FOR POWERED DRIVER 05.000.007.01S

## (undated) DEVICE — SUCTION TIP YANKAUER W/O VENT K86

## (undated) DEVICE — SU VICRYL+ 3-0 27IN FS-2 UND VCP423H

## (undated) DEVICE — BONE WAX 2.5GM W31G

## (undated) DEVICE — SYR 10ML LL W/O NDL 302995

## (undated) DEVICE — ESU NDL COLORADO MICRO 3CM STR N103A

## (undated) DEVICE — PREP DYNA-HEX 4% CHG SCRUB 4OZ BOTTLE MDS098710

## (undated) DEVICE — LINEN GOWN X4 5410

## (undated) DEVICE — NDL 25GA 1.5" 305838

## (undated) DEVICE — DRSG JAWBRA  95

## (undated) DEVICE — BLADE CLIPPER SGL USE 9680

## (undated) DEVICE — DRAIN PENROSE 0.25"X18" LATEX FREE GR201

## (undated) DEVICE — SU SILK 2-0 SH 30" K833H

## (undated) DEVICE — POSITIONER HEAD DONUT FOAM 9" LF FP-HEAD9

## (undated) DEVICE — SPONGE PACK VAGINAL 2"X9

## (undated) RX ORDER — PROPOFOL 10 MG/ML
INJECTION, EMULSION INTRAVENOUS
Status: DISPENSED
Start: 2024-09-28

## (undated) RX ORDER — CHLORHEXIDINE GLUCONATE ORAL RINSE 1.2 MG/ML
SOLUTION DENTAL
Status: DISPENSED
Start: 2024-09-22

## (undated) RX ORDER — HYDROMORPHONE HYDROCHLORIDE 1 MG/ML
INJECTION, SOLUTION INTRAMUSCULAR; INTRAVENOUS; SUBCUTANEOUS
Status: DISPENSED
Start: 2024-09-28

## (undated) RX ORDER — FENTANYL CITRATE 50 UG/ML
INJECTION, SOLUTION INTRAMUSCULAR; INTRAVENOUS
Status: DISPENSED
Start: 2025-03-18

## (undated) RX ORDER — CEFAZOLIN SODIUM/WATER 2 G/20 ML
SYRINGE (ML) INTRAVENOUS
Status: DISPENSED
Start: 2024-10-29

## (undated) RX ORDER — ACETAMINOPHEN 325 MG/1
TABLET ORAL
Status: DISPENSED
Start: 2024-09-22

## (undated) RX ORDER — FENTANYL CITRATE 50 UG/ML
INJECTION, SOLUTION INTRAMUSCULAR; INTRAVENOUS
Status: DISPENSED
Start: 2024-09-22

## (undated) RX ORDER — LIDOCAINE HYDROCHLORIDE AND EPINEPHRINE 10; 10 MG/ML; UG/ML
INJECTION, SOLUTION INFILTRATION; PERINEURAL
Status: DISPENSED
Start: 2024-09-28

## (undated) RX ORDER — DEXAMETHASONE SODIUM PHOSPHATE 4 MG/ML
INJECTION, SOLUTION INTRA-ARTICULAR; INTRALESIONAL; INTRAMUSCULAR; INTRAVENOUS; SOFT TISSUE
Status: DISPENSED
Start: 2024-09-28

## (undated) RX ORDER — CHLORHEXIDINE GLUCONATE ORAL RINSE 1.2 MG/ML
SOLUTION DENTAL
Status: DISPENSED
Start: 2024-10-29

## (undated) RX ORDER — HYDROMORPHONE HYDROCHLORIDE 1 MG/ML
INJECTION, SOLUTION INTRAMUSCULAR; INTRAVENOUS; SUBCUTANEOUS
Status: DISPENSED
Start: 2024-09-22

## (undated) RX ORDER — FENTANYL CITRATE-0.9 % NACL/PF 10 MCG/ML
PLASTIC BAG, INJECTION (ML) INTRAVENOUS
Status: DISPENSED
Start: 2024-09-28

## (undated) RX ORDER — ONDANSETRON 2 MG/ML
INJECTION INTRAMUSCULAR; INTRAVENOUS
Status: DISPENSED
Start: 2024-09-28

## (undated) RX ORDER — OXYCODONE HYDROCHLORIDE 5 MG/1
TABLET ORAL
Status: DISPENSED
Start: 2024-10-29

## (undated) RX ORDER — ONDANSETRON 2 MG/ML
INJECTION INTRAMUSCULAR; INTRAVENOUS
Status: DISPENSED
Start: 2024-10-29

## (undated) RX ORDER — ACETAMINOPHEN 325 MG/1
TABLET ORAL
Status: DISPENSED
Start: 2024-09-28

## (undated) RX ORDER — PROPOFOL 10 MG/ML
INJECTION, EMULSION INTRAVENOUS
Status: DISPENSED
Start: 2024-10-29

## (undated) RX ORDER — HYDROMORPHONE HYDROCHLORIDE 1 MG/ML
INJECTION, SOLUTION INTRAMUSCULAR; INTRAVENOUS; SUBCUTANEOUS
Status: DISPENSED
Start: 2024-10-29

## (undated) RX ORDER — FENTANYL CITRATE 50 UG/ML
INJECTION, SOLUTION INTRAMUSCULAR; INTRAVENOUS
Status: DISPENSED
Start: 2024-09-28

## (undated) RX ORDER — LIDOCAINE HYDROCHLORIDE AND EPINEPHRINE 10; 10 MG/ML; UG/ML
INJECTION, SOLUTION INFILTRATION; PERINEURAL
Status: DISPENSED
Start: 2024-10-29

## (undated) RX ORDER — FENTANYL CITRATE 50 UG/ML
INJECTION, SOLUTION INTRAMUSCULAR; INTRAVENOUS
Status: DISPENSED
Start: 2024-10-29

## (undated) RX ORDER — DEXAMETHASONE SODIUM PHOSPHATE 4 MG/ML
INJECTION, SOLUTION INTRA-ARTICULAR; INTRALESIONAL; INTRAMUSCULAR; INTRAVENOUS; SOFT TISSUE
Status: DISPENSED
Start: 2024-10-29

## (undated) RX ORDER — OXYCODONE HYDROCHLORIDE 5 MG/1
TABLET ORAL
Status: DISPENSED
Start: 2025-03-18

## (undated) RX ORDER — CHLORHEXIDINE GLUCONATE ORAL RINSE 1.2 MG/ML
SOLUTION DENTAL
Status: DISPENSED
Start: 2025-03-18

## (undated) RX ORDER — OXYMETAZOLINE HYDROCHLORIDE 0.05 G/100ML
SPRAY NASAL
Status: DISPENSED
Start: 2024-09-22

## (undated) RX ORDER — ACETAMINOPHEN 325 MG/1
TABLET ORAL
Status: DISPENSED
Start: 2024-10-29

## (undated) RX ORDER — ACETAMINOPHEN 325 MG/1
TABLET ORAL
Status: DISPENSED
Start: 2025-03-18

## (undated) RX ORDER — FENTANYL CITRATE 0.05 MG/ML
INJECTION, SOLUTION INTRAMUSCULAR; INTRAVENOUS
Status: DISPENSED
Start: 2024-09-22

## (undated) RX ORDER — OXYCODONE HYDROCHLORIDE 5 MG/1
TABLET ORAL
Status: DISPENSED
Start: 2024-09-28